# Patient Record
Sex: FEMALE | Race: WHITE | NOT HISPANIC OR LATINO | Employment: OTHER | URBAN - NONMETROPOLITAN AREA
[De-identification: names, ages, dates, MRNs, and addresses within clinical notes are randomized per-mention and may not be internally consistent; named-entity substitution may affect disease eponyms.]

---

## 2018-02-14 ENCOUNTER — MEDICAL CORRESPONDENCE (OUTPATIENT)
Dept: HEALTH INFORMATION MANAGEMENT | Facility: OTHER | Age: 83
End: 2018-02-14

## 2018-02-14 DIAGNOSIS — M47.814 SPONDYLOSIS OF THORACIC REGION WITHOUT MYELOPATHY OR RADICULOPATHY: Primary | ICD-10-CM

## 2018-07-02 DIAGNOSIS — M54.50 LEFT LOW BACK PAIN: Primary | ICD-10-CM

## 2019-03-09 ENCOUNTER — TRANSFERRED RECORDS (OUTPATIENT)
Dept: HEALTH INFORMATION MANAGEMENT | Facility: OTHER | Age: 84
End: 2019-03-09

## 2019-05-15 RX ORDER — SILVER SULFADIAZINE 1 %
CREAM (GRAM) TOPICAL
Refills: 5 | OUTPATIENT
Start: 2019-05-15

## 2019-05-15 NOTE — TELEPHONE ENCOUNTER
Rx request from pharmacy as follows:    Name from pharmacy: SSD 1%              CRE          Will file in chart as: SSD 1 % external cream    Sig: APPLY TWICE DAILY TO  BURN    Disp:  Not specified (Pharmacy requested: 600 each)    Refills:  5    Start: 5/13/2019    Class: E-Prescribe    To pharmacy: Please consider 90 day supplies to promote better adherence    Requested on: 12/4/2009    Last refill: 12/4/2009      With last fill date in 2009. Writer also notes that patient has been receiving care at [facility] in [place] and that PCP is Dr. Cruz and not Dr. Fatima. Call placed to [facility] pharmacy. Spoke to Jie, pharmacist. Advised her of above. Pharmacist states that they are aware of PCP at [facility] and refill request has already been routed to him. She advised this writer to disregard Rx request at this time. Writer will do as requested.    Unable to complete prescription refill per RN Medication Refill Policy. Gilbert Finch 5/15/2019 12:24 PM

## 2019-10-28 ENCOUNTER — HOSPITAL ENCOUNTER (INPATIENT)
Facility: OTHER | Age: 84
LOS: 3 days | Discharge: NURSING, REHAB OR LONG-TERM CARE | DRG: 871 | End: 2019-10-31
Attending: FAMILY MEDICINE | Admitting: FAMILY MEDICINE
Payer: COMMERCIAL

## 2019-10-28 ENCOUNTER — APPOINTMENT (OUTPATIENT)
Dept: GENERAL RADIOLOGY | Facility: OTHER | Age: 84
DRG: 871 | End: 2019-10-28
Attending: FAMILY MEDICINE
Payer: COMMERCIAL

## 2019-10-28 ENCOUNTER — APPOINTMENT (OUTPATIENT)
Dept: OCCUPATIONAL THERAPY | Facility: OTHER | Age: 84
DRG: 871 | End: 2019-10-28
Attending: FAMILY MEDICINE
Payer: COMMERCIAL

## 2019-10-28 DIAGNOSIS — A41.9 SEPSIS, DUE TO UNSPECIFIED ORGANISM, UNSPECIFIED WHETHER ACUTE ORGAN DYSFUNCTION PRESENT (H): ICD-10-CM

## 2019-10-28 DIAGNOSIS — Z87.891 PERSONAL HISTORY OF TOBACCO USE, PRESENTING HAZARDS TO HEALTH: ICD-10-CM

## 2019-10-28 DIAGNOSIS — J18.9 PNEUMONIA DUE TO INFECTIOUS ORGANISM, UNSPECIFIED LATERALITY, UNSPECIFIED PART OF LUNG: Primary | ICD-10-CM

## 2019-10-28 DIAGNOSIS — I25.2 OLD MYOCARDIAL INFARCTION: ICD-10-CM

## 2019-10-28 DIAGNOSIS — Z79.82 ENCOUNTER FOR LONG-TERM (CURRENT) USE OF ASPIRIN: ICD-10-CM

## 2019-10-28 PROBLEM — R06.2 WHEEZING: Status: ACTIVE | Noted: 2019-01-05

## 2019-10-28 PROBLEM — E73.9 LACTOSE INTOLERANCE: Status: ACTIVE | Noted: 2019-01-05

## 2019-10-28 PROBLEM — C44.90 NONMELANOMA SKIN CANCER: Status: ACTIVE | Noted: 2019-10-28

## 2019-10-28 PROBLEM — N32.89 BLADDER INSTABILITY: Status: ACTIVE | Noted: 2017-10-04

## 2019-10-28 PROBLEM — R41.89 COGNITIVE IMPAIRMENT: Status: ACTIVE | Noted: 2019-01-06

## 2019-10-28 PROBLEM — R11.10 VOMITING: Status: RESOLVED | Noted: 2019-01-06 | Resolved: 2019-10-28

## 2019-10-28 PROBLEM — E04.9 GOITER: Status: ACTIVE | Noted: 2019-01-05

## 2019-10-28 PROBLEM — L60.0 ONYCHOCRYPTOSIS: Status: ACTIVE | Noted: 2019-10-28

## 2019-10-28 PROBLEM — M40.00 KYPHOSIS (ACQUIRED) (POSTURAL): Status: ACTIVE | Noted: 2019-01-05

## 2019-10-28 PROBLEM — R06.02 SHORTNESS OF BREATH: Status: ACTIVE | Noted: 2019-01-05

## 2019-10-28 PROBLEM — N39.498 OTHER URINARY INCONTINENCE: Status: ACTIVE | Noted: 2019-01-05

## 2019-10-28 PROBLEM — G89.29 CHRONIC RIGHT HIP PAIN: Status: ACTIVE | Noted: 2017-10-04

## 2019-10-28 PROBLEM — R06.2 WHEEZING: Status: RESOLVED | Noted: 2019-01-05 | Resolved: 2019-10-28

## 2019-10-28 PROBLEM — R11.10 VOMITING: Status: ACTIVE | Noted: 2019-01-06

## 2019-10-28 PROBLEM — R06.02 SHORTNESS OF BREATH: Status: RESOLVED | Noted: 2019-01-05 | Resolved: 2019-10-28

## 2019-10-28 PROBLEM — M25.551 CHRONIC RIGHT HIP PAIN: Status: ACTIVE | Noted: 2017-10-04

## 2019-10-28 LAB
ALBUMIN SERPL-MCNC: 4.7 G/DL (ref 3.5–5.7)
ALBUMIN UR-MCNC: NEGATIVE MG/DL
ALP SERPL-CCNC: 79 U/L (ref 34–104)
ALT SERPL W P-5'-P-CCNC: 23 U/L (ref 7–52)
ANION GAP SERPL CALCULATED.3IONS-SCNC: 10 MMOL/L (ref 3–14)
APPEARANCE UR: CLEAR
AST SERPL W P-5'-P-CCNC: 47 U/L (ref 13–39)
BACTERIA #/AREA URNS HPF: ABNORMAL /HPF
BASOPHILS # BLD AUTO: 0 10E9/L (ref 0–0.2)
BASOPHILS NFR BLD AUTO: 0.2 %
BILIRUB SERPL-MCNC: 0.6 MG/DL (ref 0.3–1)
BILIRUB UR QL STRIP: NEGATIVE
BUN SERPL-MCNC: 20 MG/DL (ref 7–25)
CALCIUM SERPL-MCNC: 10.7 MG/DL (ref 8.6–10.3)
CHLORIDE SERPL-SCNC: 99 MMOL/L (ref 98–107)
CO2 SERPL-SCNC: 27 MMOL/L (ref 21–31)
COLOR UR AUTO: YELLOW
CREAT SERPL-MCNC: 0.8 MG/DL (ref 0.6–1.2)
DIFFERENTIAL METHOD BLD: ABNORMAL
EOSINOPHIL # BLD AUTO: 0 10E9/L (ref 0–0.7)
EOSINOPHIL NFR BLD AUTO: 0 %
ERYTHROCYTE [DISTWIDTH] IN BLOOD BY AUTOMATED COUNT: 14.1 % (ref 10–15)
FLUAV+FLUBV RNA SPEC QL NAA+PROBE: NEGATIVE
FLUAV+FLUBV RNA SPEC QL NAA+PROBE: NEGATIVE
GFR SERPL CREATININE-BSD FRML MDRD: 68 ML/MIN/{1.73_M2}
GLUCOSE SERPL-MCNC: 189 MG/DL (ref 70–105)
GLUCOSE UR STRIP-MCNC: NEGATIVE MG/DL
HCT VFR BLD AUTO: 41.7 % (ref 35–47)
HGB BLD-MCNC: 13.3 G/DL (ref 11.7–15.7)
HGB UR QL STRIP: ABNORMAL
IMM GRANULOCYTES # BLD: 0.6 10E9/L (ref 0–0.4)
IMM GRANULOCYTES NFR BLD: 2.3 %
KETONES UR STRIP-MCNC: NEGATIVE MG/DL
LACTATE SERPL-SCNC: 1.9 MMOL/L (ref 0.5–2.2)
LACTATE SERPL-SCNC: 2.1 MMOL/L (ref 0.5–2.2)
LEUKOCYTE ESTERASE UR QL STRIP: NEGATIVE
LYMPHOCYTES # BLD AUTO: 0.5 10E9/L (ref 0.8–5.3)
LYMPHOCYTES NFR BLD AUTO: 2.3 %
MAGNESIUM SERPL-MCNC: 1.9 MG/DL (ref 1.9–2.7)
MCH RBC QN AUTO: 29.2 PG (ref 26.5–33)
MCHC RBC AUTO-ENTMCNC: 31.9 G/DL (ref 31.5–36.5)
MCV RBC AUTO: 91 FL (ref 78–100)
MONOCYTES # BLD AUTO: 1.2 10E9/L (ref 0–1.3)
MONOCYTES NFR BLD AUTO: 5.2 %
NEUTROPHILS # BLD AUTO: 21.1 10E9/L (ref 1.6–8.3)
NEUTROPHILS NFR BLD AUTO: 90 %
NITRATE UR QL: NEGATIVE
PH UR STRIP: 6.5 PH (ref 5–9)
PLATELET # BLD AUTO: 306 10E9/L (ref 150–450)
POTASSIUM SERPL-SCNC: 3.8 MMOL/L (ref 3.5–5.1)
PROCALCITONIN SERPL-MCNC: 3.2 NG/ML
PROT SERPL-MCNC: 8.5 G/DL (ref 6.4–8.9)
RBC # BLD AUTO: 4.56 10E12/L (ref 3.8–5.2)
RBC #/AREA URNS AUTO: ABNORMAL /HPF
RSV RNA SPEC NAA+PROBE: NEGATIVE
SODIUM SERPL-SCNC: 136 MMOL/L (ref 134–144)
SOURCE: ABNORMAL
SP GR UR STRIP: 1.02 (ref 1–1.03)
SPECIMEN SOURCE: NORMAL
UROBILINOGEN UR STRIP-ACNC: 0.2 EU/DL (ref 0.2–1)
WBC # BLD AUTO: 23.4 10E9/L (ref 4–11)
WBC #/AREA URNS AUTO: ABNORMAL /HPF

## 2019-10-28 PROCEDURE — 97535 SELF CARE MNGMENT TRAINING: CPT | Mod: GO | Performed by: OCCUPATIONAL THERAPIST

## 2019-10-28 PROCEDURE — 83735 ASSAY OF MAGNESIUM: CPT | Performed by: FAMILY MEDICINE

## 2019-10-28 PROCEDURE — 85025 COMPLETE CBC W/AUTO DIFF WBC: CPT | Performed by: FAMILY MEDICINE

## 2019-10-28 PROCEDURE — 83605 ASSAY OF LACTIC ACID: CPT | Performed by: FAMILY MEDICINE

## 2019-10-28 PROCEDURE — 81001 URINALYSIS AUTO W/SCOPE: CPT | Performed by: FAMILY MEDICINE

## 2019-10-28 PROCEDURE — 25000132 ZZH RX MED GY IP 250 OP 250 PS 637: Performed by: FAMILY MEDICINE

## 2019-10-28 PROCEDURE — 36415 COLL VENOUS BLD VENIPUNCTURE: CPT | Performed by: FAMILY MEDICINE

## 2019-10-28 PROCEDURE — 97165 OT EVAL LOW COMPLEX 30 MIN: CPT | Mod: GO | Performed by: OCCUPATIONAL THERAPIST

## 2019-10-28 PROCEDURE — 97530 THERAPEUTIC ACTIVITIES: CPT | Mod: GO | Performed by: OCCUPATIONAL THERAPIST

## 2019-10-28 PROCEDURE — 87631 RESP VIRUS 3-5 TARGETS: CPT | Performed by: FAMILY MEDICINE

## 2019-10-28 PROCEDURE — 25800030 ZZH RX IP 258 OP 636: Performed by: FAMILY MEDICINE

## 2019-10-28 PROCEDURE — 12000000 ZZH R&B MED SURG/OB

## 2019-10-28 PROCEDURE — 71046 X-RAY EXAM CHEST 2 VIEWS: CPT

## 2019-10-28 PROCEDURE — 96365 THER/PROPH/DIAG IV INF INIT: CPT | Performed by: FAMILY MEDICINE

## 2019-10-28 PROCEDURE — 80053 COMPREHEN METABOLIC PANEL: CPT | Performed by: FAMILY MEDICINE

## 2019-10-28 PROCEDURE — 99285 EMERGENCY DEPT VISIT HI MDM: CPT | Mod: Z6 | Performed by: FAMILY MEDICINE

## 2019-10-28 PROCEDURE — 96375 TX/PRO/DX INJ NEW DRUG ADDON: CPT | Performed by: FAMILY MEDICINE

## 2019-10-28 PROCEDURE — 25000128 H RX IP 250 OP 636: Performed by: FAMILY MEDICINE

## 2019-10-28 PROCEDURE — 84145 PROCALCITONIN (PCT): CPT | Performed by: FAMILY MEDICINE

## 2019-10-28 PROCEDURE — 97530 THERAPEUTIC ACTIVITIES: CPT | Mod: GP

## 2019-10-28 PROCEDURE — 87040 BLOOD CULTURE FOR BACTERIA: CPT | Performed by: FAMILY MEDICINE

## 2019-10-28 PROCEDURE — 93010 ELECTROCARDIOGRAM REPORT: CPT | Performed by: INTERNAL MEDICINE

## 2019-10-28 PROCEDURE — 99285 EMERGENCY DEPT VISIT HI MDM: CPT | Mod: 25 | Performed by: FAMILY MEDICINE

## 2019-10-28 PROCEDURE — 97161 PT EVAL LOW COMPLEX 20 MIN: CPT | Mod: GP

## 2019-10-28 PROCEDURE — 99223 1ST HOSP IP/OBS HIGH 75: CPT | Mod: AI | Performed by: FAMILY MEDICINE

## 2019-10-28 RX ORDER — POLYETHYLENE GLYCOL 3350 17 G/17G
17 POWDER, FOR SOLUTION ORAL DAILY PRN
Status: DISCONTINUED | OUTPATIENT
Start: 2019-10-28 | End: 2019-10-31 | Stop reason: HOSPADM

## 2019-10-28 RX ORDER — ASPIRIN 81 MG/1
81 TABLET ORAL DAILY
Status: ON HOLD | COMMUNITY
End: 2023-01-01

## 2019-10-28 RX ORDER — POTASSIUM CHLORIDE 7.45 MG/ML
10 INJECTION INTRAVENOUS
Status: DISCONTINUED | OUTPATIENT
Start: 2019-10-28 | End: 2019-10-31 | Stop reason: HOSPADM

## 2019-10-28 RX ORDER — POTASSIUM CHLORIDE 1500 MG/1
20-40 TABLET, EXTENDED RELEASE ORAL
Status: DISCONTINUED | OUTPATIENT
Start: 2019-10-28 | End: 2019-10-31 | Stop reason: HOSPADM

## 2019-10-28 RX ORDER — KETOCONAZOLE 20 MG/G
CREAM TOPICAL DAILY PRN
Status: ON HOLD | COMMUNITY
Start: 2018-01-26 | End: 2022-01-01

## 2019-10-28 RX ORDER — SODIUM CHLORIDE 9 MG/ML
INJECTION, SOLUTION INTRAVENOUS CONTINUOUS
Status: DISCONTINUED | OUTPATIENT
Start: 2019-10-28 | End: 2019-10-29

## 2019-10-28 RX ORDER — MAGNESIUM SULFATE HEPTAHYDRATE 40 MG/ML
4 INJECTION, SOLUTION INTRAVENOUS EVERY 4 HOURS PRN
Status: DISCONTINUED | OUTPATIENT
Start: 2019-10-28 | End: 2019-10-31 | Stop reason: HOSPADM

## 2019-10-28 RX ORDER — TRAMADOL HYDROCHLORIDE 50 MG/1
50 TABLET ORAL EVERY 6 HOURS PRN
Status: DISCONTINUED | OUTPATIENT
Start: 2019-10-28 | End: 2019-10-31 | Stop reason: HOSPADM

## 2019-10-28 RX ORDER — ATORVASTATIN CALCIUM 10 MG/1
10 TABLET, FILM COATED ORAL DAILY
Status: DISCONTINUED | OUTPATIENT
Start: 2019-10-28 | End: 2019-10-31 | Stop reason: HOSPADM

## 2019-10-28 RX ORDER — OXYBUTYNIN CHLORIDE 5 MG/1
5 TABLET, EXTENDED RELEASE ORAL 2 TIMES DAILY
COMMUNITY
Start: 2019-01-01

## 2019-10-28 RX ORDER — LIDOCAINE 40 MG/G
CREAM TOPICAL
Status: DISCONTINUED | OUTPATIENT
Start: 2019-10-28 | End: 2019-10-31 | Stop reason: HOSPADM

## 2019-10-28 RX ORDER — AMOXICILLIN 250 MG
1 CAPSULE ORAL 2 TIMES DAILY PRN
Status: DISCONTINUED | OUTPATIENT
Start: 2019-10-28 | End: 2019-10-31 | Stop reason: HOSPADM

## 2019-10-28 RX ORDER — ONDANSETRON 2 MG/ML
4 INJECTION INTRAMUSCULAR; INTRAVENOUS EVERY 6 HOURS PRN
Status: DISCONTINUED | OUTPATIENT
Start: 2019-10-28 | End: 2019-10-31 | Stop reason: HOSPADM

## 2019-10-28 RX ORDER — PROCHLORPERAZINE 25 MG
12.5 SUPPOSITORY, RECTAL RECTAL EVERY 12 HOURS PRN
Status: DISCONTINUED | OUTPATIENT
Start: 2019-10-28 | End: 2019-10-31 | Stop reason: HOSPADM

## 2019-10-28 RX ORDER — PRAVASTATIN SODIUM 20 MG
20 TABLET ORAL
COMMUNITY
Start: 2018-10-31 | End: 2022-01-01

## 2019-10-28 RX ORDER — AMOXICILLIN 250 MG
2 CAPSULE ORAL 2 TIMES DAILY PRN
Status: DISCONTINUED | OUTPATIENT
Start: 2019-10-28 | End: 2019-10-31 | Stop reason: HOSPADM

## 2019-10-28 RX ORDER — LIDOCAINE 40 MG/G
CREAM TOPICAL
Status: DISCONTINUED | OUTPATIENT
Start: 2019-10-28 | End: 2019-10-28

## 2019-10-28 RX ORDER — ACETAMINOPHEN 325 MG/1
650 TABLET ORAL
Status: DISCONTINUED | OUTPATIENT
Start: 2019-10-28 | End: 2019-10-28

## 2019-10-28 RX ORDER — ONDANSETRON 4 MG/1
4 TABLET, ORALLY DISINTEGRATING ORAL EVERY 6 HOURS PRN
Status: DISCONTINUED | OUTPATIENT
Start: 2019-10-28 | End: 2019-10-31 | Stop reason: HOSPADM

## 2019-10-28 RX ORDER — ALENDRONATE SODIUM 70 MG/1
70 TABLET ORAL
COMMUNITY
Start: 2014-10-07 | End: 2022-01-01

## 2019-10-28 RX ORDER — NALOXONE HYDROCHLORIDE 0.4 MG/ML
.1-.4 INJECTION, SOLUTION INTRAMUSCULAR; INTRAVENOUS; SUBCUTANEOUS
Status: DISCONTINUED | OUTPATIENT
Start: 2019-10-28 | End: 2019-10-31 | Stop reason: HOSPADM

## 2019-10-28 RX ORDER — ASPIRIN 81 MG/1
81 TABLET ORAL DAILY
Status: DISCONTINUED | OUTPATIENT
Start: 2019-10-28 | End: 2019-10-31 | Stop reason: HOSPADM

## 2019-10-28 RX ORDER — CEFTRIAXONE SODIUM 2 G
2 VIAL (EA) INJECTION EVERY 24 HOURS
Status: DISCONTINUED | OUTPATIENT
Start: 2019-10-29 | End: 2019-10-31 | Stop reason: HOSPADM

## 2019-10-28 RX ORDER — MAGNESIUM SULFATE HEPTAHYDRATE 40 MG/ML
2 INJECTION, SOLUTION INTRAVENOUS DAILY PRN
Status: DISCONTINUED | OUTPATIENT
Start: 2019-10-28 | End: 2019-10-31 | Stop reason: HOSPADM

## 2019-10-28 RX ORDER — AZITHROMYCIN 500 MG/5ML
500 INJECTION, POWDER, LYOPHILIZED, FOR SOLUTION INTRAVENOUS EVERY 24 HOURS
Status: DISCONTINUED | OUTPATIENT
Start: 2019-10-28 | End: 2019-10-31 | Stop reason: HOSPADM

## 2019-10-28 RX ORDER — PROCHLORPERAZINE MALEATE 5 MG
5 TABLET ORAL EVERY 6 HOURS PRN
Status: DISCONTINUED | OUTPATIENT
Start: 2019-10-28 | End: 2019-10-31 | Stop reason: HOSPADM

## 2019-10-28 RX ORDER — OXYBUTYNIN CHLORIDE 5 MG/1
5 TABLET, EXTENDED RELEASE ORAL DAILY
Status: DISCONTINUED | OUTPATIENT
Start: 2019-10-28 | End: 2019-10-31 | Stop reason: HOSPADM

## 2019-10-28 RX ORDER — SODIUM CHLORIDE 9 MG/ML
1000 INJECTION, SOLUTION INTRAVENOUS CONTINUOUS
Status: DISCONTINUED | OUTPATIENT
Start: 2019-10-28 | End: 2019-10-28 | Stop reason: ALTCHOICE

## 2019-10-28 RX ORDER — ACETAMINOPHEN 500 MG
1000 TABLET ORAL EVERY 8 HOURS PRN
COMMUNITY

## 2019-10-28 RX ORDER — ONDANSETRON 2 MG/ML
4 INJECTION INTRAMUSCULAR; INTRAVENOUS EVERY 30 MIN PRN
Status: DISCONTINUED | OUTPATIENT
Start: 2019-10-28 | End: 2019-10-28

## 2019-10-28 RX ORDER — ACETAMINOPHEN 650 MG/1
650 SUPPOSITORY RECTAL
Status: DISCONTINUED | OUTPATIENT
Start: 2019-10-28 | End: 2019-10-28

## 2019-10-28 RX ORDER — CEFTRIAXONE SODIUM 2 G
2 VIAL (EA) INJECTION ONCE
Status: COMPLETED | OUTPATIENT
Start: 2019-10-28 | End: 2019-10-28

## 2019-10-28 RX ORDER — NITROGLYCERIN 0.4 MG/1
0.4 TABLET SUBLINGUAL EVERY 5 MIN PRN
COMMUNITY
Start: 2014-04-02

## 2019-10-28 RX ORDER — PRAVASTATIN SODIUM 40 MG
40 TABLET ORAL DAILY
Status: DISCONTINUED | OUTPATIENT
Start: 2019-10-28 | End: 2019-10-28 | Stop reason: CLARIF

## 2019-10-28 RX ORDER — FAMOTIDINE 20 MG/1
20 TABLET, FILM COATED ORAL DAILY
Status: DISCONTINUED | OUTPATIENT
Start: 2019-10-28 | End: 2019-10-31 | Stop reason: HOSPADM

## 2019-10-28 RX ORDER — TRAMADOL HYDROCHLORIDE 50 MG/1
50 TABLET ORAL EVERY 6 HOURS PRN
Status: ON HOLD | COMMUNITY
End: 2019-10-28

## 2019-10-28 RX ORDER — ACETAMINOPHEN 325 MG/1
650 TABLET ORAL EVERY 4 HOURS PRN
Status: DISCONTINUED | OUTPATIENT
Start: 2019-10-28 | End: 2019-10-31 | Stop reason: HOSPADM

## 2019-10-28 RX ADMIN — SODIUM CHLORIDE 1000 ML: 9 INJECTION, SOLUTION INTRAVENOUS at 05:47

## 2019-10-28 RX ADMIN — ACETAMINOPHEN 650 MG: 325 TABLET, FILM COATED ORAL at 22:04

## 2019-10-28 RX ADMIN — CEFTRIAXONE SODIUM 2 G: 2 INJECTION, POWDER, FOR SOLUTION INTRAMUSCULAR; INTRAVENOUS at 06:02

## 2019-10-28 RX ADMIN — ACETAMINOPHEN 650 MG: 325 TABLET, FILM COATED ORAL at 07:27

## 2019-10-28 RX ADMIN — ONDANSETRON HYDROCHLORIDE 4 MG: 2 INJECTION, SOLUTION INTRAMUSCULAR; INTRAVENOUS at 04:42

## 2019-10-28 RX ADMIN — ASPIRIN 81 MG: 81 TABLET, COATED ORAL at 10:19

## 2019-10-28 RX ADMIN — ENOXAPARIN SODIUM 40 MG: 40 INJECTION SUBCUTANEOUS at 10:19

## 2019-10-28 RX ADMIN — ONDANSETRON HYDROCHLORIDE 4 MG: 2 INJECTION, SOLUTION INTRAMUSCULAR; INTRAVENOUS at 05:44

## 2019-10-28 RX ADMIN — ACETAMINOPHEN 650 MG: 325 TABLET, FILM COATED ORAL at 12:37

## 2019-10-28 RX ADMIN — SODIUM CHLORIDE: 9 INJECTION, SOLUTION INTRAVENOUS at 04:42

## 2019-10-28 RX ADMIN — AZITHROMYCIN MONOHYDRATE 500 MG: 500 INJECTION, POWDER, LYOPHILIZED, FOR SOLUTION INTRAVENOUS at 08:50

## 2019-10-28 RX ADMIN — SODIUM CHLORIDE: 9 INJECTION, SOLUTION INTRAVENOUS at 12:43

## 2019-10-28 RX ADMIN — FAMOTIDINE 20 MG: 20 TABLET ORAL at 10:18

## 2019-10-28 RX ADMIN — ACETAMINOPHEN 650 MG: 325 TABLET, FILM COATED ORAL at 16:33

## 2019-10-28 RX ADMIN — ATORVASTATIN CALCIUM 10 MG: 10 TABLET, FILM COATED ORAL at 10:18

## 2019-10-28 RX ADMIN — MAGNESIUM SULFATE HEPTAHYDRATE 2 G: 40 INJECTION, SOLUTION INTRAVENOUS at 12:38

## 2019-10-28 RX ADMIN — POTASSIUM CHLORIDE 20 MEQ: 1500 TABLET, EXTENDED RELEASE ORAL at 16:33

## 2019-10-28 RX ADMIN — OXYBUTYNIN CHLORIDE 5 MG: 5 TABLET, EXTENDED RELEASE ORAL at 12:23

## 2019-10-28 ASSESSMENT — ENCOUNTER SYMPTOMS
CONFUSION: 0
FEVER: 0
DIFFICULTY URINATING: 0
SHORTNESS OF BREATH: 0
HEADACHES: 0
ARTHRALGIAS: 0
ABDOMINAL PAIN: 0
EYE REDNESS: 0
COLOR CHANGE: 0
NECK STIFFNESS: 0
DYSURIA: 0

## 2019-10-28 ASSESSMENT — MIFFLIN-ST. JEOR
SCORE: 1322.66
SCORE: 1312.23

## 2019-10-28 ASSESSMENT — ACTIVITIES OF DAILY LIVING (ADL)
ADLS_ACUITY_SCORE: 22
ADLS_ACUITY_SCORE: 16
ADLS_ACUITY_SCORE: 16

## 2019-10-28 NOTE — PHARMACY-CONSULT NOTE
Pharmacy- Renal Dose Adjustment    Patient Active Problem List   Diagnosis     Bladder instability     Chronic right hip pain     Cognitive impairment     Colonoscopy refused     Coronary atherosclerosis of native coronary artery     Full dentures     Goiter     Gout     Hyperlipidemia     Hypertension     Kyphosis (acquired) (postural)     Lactose intolerance     Nonmelanoma skin cancer     Onychocryptosis     Onychomycosis     Osteopenia     Other urinary incontinence     Urinary tract infection     Unstable angina (H)     Sepsis (H)        Relevant Labs:  Recent Labs   Lab Test 10/28/19  0450 03/19/15  0956   WBC 23.4*  --    HGB 13.3 12.6    330        CrCl: ~ 42 mL/min (rounded SCr to 1)    No intake or output data in the 24 hours ending 10/28/19 0857       Per Renal Dose Adjustment Protocol, will adjust:  1. Famotidine 20 mg to daily per CrCl 30-50 mL/min      Will continue to follow and make adjustments accordingly. Thank You.    Flor Medina [facility] ....................  10/28/2019   8:57 AM

## 2019-10-28 NOTE — PROGRESS NOTES
"Niru Durham 87 year old female   Admission date:10/28/2019   Code status: Full Code   Isolation:No active isolations   Principal Problem:Sepsis (H)   Diet: Carbohydrate Controlled Diet  Post Op Day #: NA  Peds:YES, Not applicable  [place]: dominick  Discharge timeline & plan: unsure of discharge date and/or discharge needs at this time.  Vital signs:  Temp: 101  F (38.3  C) Temp src: Tympanic BP: 116/56 Pulse: 118 Heart Rate: 94 Resp: 16 SpO2: 90 % O2 Device: None (Room air)   Height: 157.5 cm (5' 2\") Weight: 92.4 kg (203 lb 11.2 oz)  Estimated body mass index is 37.26 kg/m  as calculated from the following:    Height as of this encounter: 1.575 m (5' 2\").    Weight as of this encounter: 92.4 kg (203 lb 11.2 oz).  Abnormal Physical Assessment: decreased mobility.  Last Pain/PRN Medication given:none  Abnormal labs; Finger stick POCT blood sugars,: noon blood sugar 207, 1 unit of insulin.  Telemetry: No  Pending tests/procedures planned:none  Physical therapy to eval for transfers.    SAFETY CHECKLIST  Arm Bands/signs/magnets (code status, fall risk, allergy, limb, etc.) in place:YES  IVF/Medications/rate ordered infusing (do they match the order?):YES  Physical assessment (wounds, incisions, dopplers, LDA's, neuro, CIWA, etc.)YES   Environmental assessment (bed/chair alarm on, call light, side rails, restraints, etc.):{YES   Whiteboard updated:YES            "

## 2019-10-28 NOTE — H&P
[facility]    History and Physical  Hospitalist       Date of Admission:  10/28/2019    Assessment & Plan   Niru Durham is a 87 year old female who presents with fever and fall.       Sepsis (H), etiology unclear. Blunting at R base on CXR and few bacteria in urine. Hx of recurrent UTI.  -admit inpt  -ceftriaxone and azithro  -IVF  -diet as tolerated  -tylenol prn pain/fever  -oxygen as needed to maintain sats >92%.   -ambulate as able.  Requires assistance and walker  -PT/OT      Cognitive impairment, noted. Currently living alone.       Hypertension. BP normal here. Not on any antihypertensives prior to admit. Noted. Stable.       DVT Prophylaxis: Enoxaparin (Lovenox) SQ  Code Status: No Order    Yoselyn Zuñiga    Primary Care Physician   Maria Teresa Fatima    Chief Complaint   Fever, fall    History is obtained from the patient and chart review.    History of Present Illness   Niru Durham is a 87 year old female who presents with fever and fall. Had chills last night. Tried to get out of bed and fell to floor due to weakness. Was down for several hours prior to getting to her alert button. Brought in by EMS. Had fever in ER. Prior hx of bladder surgery and recurrent UTI but denies dysuria, hematuria, frequency or urgency. No nausea or vomiting. No diarrhea. Had one episode of vomiting last night. Denies abdominal pain. Denies aspiration symptoms. Lives alone. No sick contacts. Does not get outside much. No recent travel or tick bites. No recent viral URI symptoms.     Past Medical History    I have reviewed this patient's medical history and updated it with pertinent information if needed.   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  Past Surgical History:   Procedure Laterality Date     APPENDECTOMY OPEN      1944     CHOLECYSTECTOMY      1971     COLONOSCOPY      02/28/05,2 hyperplastic polyps. Dr Matthews. Repeat 10 years     ENDOSCOPIC STRIPPING VEIN(S)      1971,Bilateral vein stripping     EXTRACAPSULAR CATARACT EXTRATION WITH INTRAOCULAR LENS IMPLANT      Bilateral cataract surgery with lens implant     HYSTERECTOMY TOTAL ABDOMINAL, BILATERAL SALPINGO-OOPHORECTOMY, COMBINED      1994     OTHER SURGICAL HISTORY      1982, 1994,[id number],DILATION AND CURETTAGE       Prior to Admission Medications   Prior to Admission Medications   Prescriptions Last Dose Informant Patient Reported? Taking?   alendronate (FOSAMAX) 70 MG tablet   Yes Yes   Sig: Take 70 mg by mouth every 7 days   aspirin 81 MG EC tablet   Yes No   Sig: Take 81 mg by mouth daily   ketoconazole (NIZORAL) 2 % external cream   Yes Yes   nitroGLYcerin (NITROSTAT) 0.4 MG sublingual tablet   Yes Yes   Sig: Place 0.4 mg under the tongue   oxybutynin ER (DITROPAN-XL) 10 MG 24 hr tablet   Yes Yes   Sig: TAKE 1 TABLET BY MOUTH EVERY DAY. DO NOT CRUSH   pravastatin (PRAVACHOL) 40 MG tablet   Yes Yes   Sig: Take 40 mg by mouth   traMADol (ULTRAM) 50 MG tablet   Yes Yes   Sig: Take 50 mg by mouth every 6 hours as needed for severe pain      Facility-Administered Medications: None     Allergies   Allergies   Allergen Reactions     Codeine Nausea     Naproxen Nausea and Vomiting       Social History   I have reviewed this patient's social history and updated it with pertinent information if  needed. Niru Durham  reports that she quit smoking about 36 years ago. Her smoking use included cigarettes. She has a 0.20 pack-year smoking history. She has never used smokeless tobacco. She reports that she does not drink alcohol.    Family History   I have reviewed this patient's family history and updated it with pertinent information if needed.   Family History   Problem Relation Age of Onset     Diabetes Mother         Diabetes,diabetic complications     Breast Cancer No family hx of         Cancer-breast       Review of Systems     REVIEW OF SYSTEMS:    Constitutional: normal energy and appetite, no recent sick contacts  Eyes: no changes in vision  Ears, nose, mouth, throat, and face: no mouth sores, dysphagia, or odynophagia  Respiratory: no shortness of breath, cough, or wheezing. No aspiration symptoms.   Cardiovascular: no chest pain, palpitations, orthopnea, increased lower extremity edema, or syncope.   Gastrointestinal: no constipation, diarrhea, nausea or abdominal pain.  Genitourinary: no dysuria, hematuria, urgency or frequency.   Hematologic/lymphatic: no unintentional weight loss or night sweats.  Musculoskeletal: no pain to extremities or falls.   Neurological: no new weakness, tingling, numbness.   Psychiatric: no hallucinations or delusions.  Endocrine:  not a known diabetic.     Additions to the above include: see HPI.     Physical Exam   Temp: 101  F (38.3  C) Temp src: Tympanic BP: 116/56 Pulse: 118 Heart Rate: 94 Resp: 16 SpO2: 90 % O2 Device: None (Room air)    Vital Signs with Ranges  Temp:  [101  F (38.3  C)-101.7  F (38.7  C)] 101  F (38.3  C)  Pulse:  [118] 118  Heart Rate:  [] 94  Resp:  [16-20] 16  BP: (116-152)/(56-80) 116/56  SpO2:  [90 %-91 %] 90 %  203 lbs 11.2 oz    Constitutional: AAO. NAD. overweight  Eyes: EOMI. Nonicteric, noninjected. Lids normal.   HEENT: NC, AT.   Respiratory: diminished. Rales at R base. No wheezing.   Cardiovascular: RR -murmur. No  edema  GI: abd soft, NT, ND.   Skin: warm, dry, intact. No rashes other than scattered bruising of R anterior lower leg  Musculoskeletal: weakness of B/L LE  Neurologic: CN intact grossly  Psychiatric: appropriate. Follows commands and answers questions appropriately.     Data   Data reviewed today:  I personally reviewed the chest x-ray image(s) showing blunting of R base..  Recent Labs   Lab 10/28/19  0450   WBC 23.4*   HGB 13.3   MCV 91         POTASSIUM 3.8   CHLORIDE 99   CO2 27   BUN 20   CR 0.80   ANIONGAP 10   ОЛЬГА 10.7*   *   ALBUMIN 4.7   PROTTOTAL 8.5   BILITOTAL 0.6   ALKPHOS 79   ALT 23   AST 47*       Recent Results (from the past 24 hour(s))   XR Chest 2 Views    Narrative    PROCEDURE:  XR CHEST 2 VW    HISTORY:  fever.     COMPARISON:  4/2/2014    FINDINGS:   The cardiac silhouette is normal in size. The pulmonary vasculature is  normal.  There are mild airspace opacities in both lung bases. There  are trace pleural effusions. No pleural effusion or pneumothorax.      Impression    IMPRESSION:  Bibasilar atelectasis or infiltrate and small pleural  effusions.      WOLF JANSEN MD

## 2019-10-28 NOTE — ED PROVIDER NOTES
"  History     Chief Complaint   Patient presents with     Fall     Shoulder Pain     HPI  Niru Durham is a 87 year old female who presents to the emergency department after rolling out of bed.  She laid on the floor for couple of hours until she hit her call button and she came in by EMS.  Reports right shoulder pain however she states it is her chronic pain.  She did not fall on anything that she thinks she heard or broke.  The right shin is numb she had her legs crossed when he was she was laying there for 2 hours.  She did not hit her head or lose consciousness.  She felt very chilled and could not get warm last night.  History of recurrent UTI.  No recent upper respiratory symptoms.  Did have an episode of emesis last night.  Feels mildly nauseated now.  Reviewed nurse's notes below, similar history is related to me.  Patient arrived via EMS after fall at home. Patient states she \"rolled out of bed\". Denies hitting head or LOC. Complains of right shoulder pain, although reports she has chronic rotator cuff problems. Also reports fever and vomiting last night before going to bed.   Allergies:  Allergies   Allergen Reactions     Codeine Nausea     Naproxen Nausea and Vomiting       Problem List:    There are no active problems to display for this patient.       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 Dr Matthews. Repeat 10 years     ENDOSCOPIC STRIPPING VEIN(S)      " "1971,Bilateral vein stripping     EXTRACAPSULAR CATARACT EXTRATION WITH INTRAOCULAR LENS IMPLANT      Bilateral cataract surgery with lens implant     HYSTERECTOMY TOTAL ABDOMINAL, BILATERAL SALPINGO-OOPHORECTOMY, COMBINED           OTHER SURGICAL HISTORY      , ,889893,DILATION AND CURETTAGE       Family History:    Family History   Problem Relation Age of Onset     Diabetes Mother         Diabetes,diabetic complications     Breast Cancer No family hx of         Cancer-breast       Social History:  Marital Status:   [5]  Social History     Tobacco Use     Smoking status: Former Smoker     Packs/day: 0.10     Years: 2.00     Pack years: 0.20     Types: Cigarettes     Last attempt to quit: 1983     Years since quittin.6     Smokeless tobacco: Never Used   Substance Use Topics     Alcohol use: No     Drug use: Unknown     Types: Other     Comment: Drug use: No        Medications:    alendronate (FOSAMAX) 70 MG tablet  ketoconazole (NIZORAL) 2 % external cream  nitroGLYcerin (NITROSTAT) 0.4 MG sublingual tablet  oxybutynin ER (DITROPAN-XL) 10 MG 24 hr tablet  pravastatin (PRAVACHOL) 40 MG tablet  traMADol (ULTRAM) 50 MG tablet  aspirin 81 MG EC tablet          Review of Systems   Constitutional: Negative for fever.   HENT: Negative for congestion.    Eyes: Negative for redness.   Respiratory: Negative for shortness of breath.    Cardiovascular: Negative for chest pain.   Gastrointestinal: Negative for abdominal pain.   Genitourinary: Negative for difficulty urinating, dysuria and enuresis.   Musculoskeletal: Negative for arthralgias and neck stiffness.   Skin: Negative for color change.   Neurological: Negative for headaches.   Psychiatric/Behavioral: Negative for confusion.       Physical Exam   BP: (!) 152/80  Pulse: 118  Heart Rate: 105  Temp: 101.2  F (38.4  C)  Resp: 20  Height: 157.5 cm (5' 2\")  Weight: 93.4 kg (206 lb)  SpO2: 91 %      Physical Exam  Vitals signs and nursing note " reviewed.   Constitutional:       General: She is not in acute distress.     Appearance: She is not diaphoretic.   HENT:      Head: Atraumatic.   Eyes:      Pupils: Pupils are equal, round, and reactive to light.   Cardiovascular:      Rate and Rhythm: Regular rhythm.      Heart sounds: Normal heart sounds.   Pulmonary:      Effort: No respiratory distress.      Breath sounds: Normal breath sounds.   Chest:      Chest wall: No tenderness.   Abdominal:      General: Bowel sounds are normal.      Palpations: Abdomen is soft.      Tenderness: There is no tenderness.   Musculoskeletal: Normal range of motion.         General: No tenderness.      Cervical back: She exhibits no tenderness.        Thoracic back: She exhibits no tenderness.      Lumbar back: She exhibits no tenderness.   Skin:     Findings: No abrasion or laceration.   Neurological:      Mental Status: She is alert and oriented to person, place, and time.         ED Course        Procedures     Results for orders placed or performed during the hospital encounter of 10/28/19 (from the past 24 hour(s))   CBC with platelets differential   Result Value Ref Range    WBC 23.4 (H) 4.0 - 11.0 10e9/L    RBC Count 4.56 3.8 - 5.2 10e12/L    Hemoglobin 13.3 11.7 - 15.7 g/dL    Hematocrit 41.7 35.0 - 47.0 %    MCV 91 78 - 100 fl    MCH 29.2 26.5 - 33.0 pg    MCHC 31.9 31.5 - 36.5 g/dL    RDW 14.1 10.0 - 15.0 %    Platelet Count 306 150 - 450 10e9/L    Diff Method Automated Method     % Neutrophils 90.0 %    % Lymphocytes 2.3 %    % Monocytes 5.2 %    % Eosinophils 0.0 %    % Basophils 0.2 %    % Immature Granulocytes 2.3 %    Absolute Neutrophil 21.1 (H) 1.6 - 8.3 10e9/L    Absolute Lymphocytes 0.5 (L) 0.8 - 5.3 10e9/L    Absolute Monocytes 1.2 0.0 - 1.3 10e9/L    Absolute Eosinophils 0.0 0.0 - 0.7 10e9/L    Absolute Basophils 0.0 0.0 - 0.2 10e9/L    Abs Immature Granulocytes 0.6 (H) 0 - 0.4 10e9/L   Comprehensive metabolic panel   Result Value Ref Range    Sodium 136  134 - 144 mmol/L    Potassium 3.8 3.5 - 5.1 mmol/L    Chloride 99 98 - 107 mmol/L    Carbon Dioxide 27 21 - 31 mmol/L    Anion Gap 10 3 - 14 mmol/L    Glucose 189 (H) 70 - 105 mg/dL    Urea Nitrogen 20 7 - 25 mg/dL    Creatinine 0.80 0.60 - 1.20 mg/dL    GFR Estimate 68 >60 mL/min/[1.73_m2]    GFR Estimate If Black 82 >60 mL/min/[1.73_m2]    Calcium 10.7 (H) 8.6 - 10.3 mg/dL    Bilirubin Total 0.6 0.3 - 1.0 mg/dL    Albumin 4.7 3.5 - 5.7 g/dL    Protein Total 8.5 6.4 - 8.9 g/dL    Alkaline Phosphatase 79 34 - 104 U/L    ALT 23 7 - 52 U/L    AST 47 (H) 13 - 39 U/L   Lactic acid   Result Value Ref Range    Lactic Acid 2.1 0.5 - 2.2 mmol/L   Influenza A and B and RSV PCR   Result Value Ref Range    Specimen Description Nasal     Influenza A PCR Negative NEG^Negative    Influenza B PCR Negative NEG^Negative    Resp Syncytial Virus Negative NEG^Negative       Medications   0.9% sodium chloride BOLUS (0 mLs Intravenous Stopped 10/28/19 0545)     Followed by   sodium chloride 0.9% infusion (1,000 mLs Intravenous New Bag 10/28/19 0547)   ondansetron (ZOFRAN) injection 4 mg (4 mg Intravenous Given 10/28/19 0544)   cefTRIAXone (ROCEPHIN) 2 g in NS (2 g Intravenous New Bag 10/28/19 0602)   acetaminophen (TYLENOL) tablet 650 mg (has no administration in time range)     Or   acetaminophen (TYLENOL) Suppository 650 mg (has no administration in time range)   lidocaine 1 % 0.1-1 mL (has no administration in time range)   lidocaine (LMX4) cream (has no administration in time range)   sodium chloride (PF) 0.9% PF flush 3 mL (has no administration in time range)   sodium chloride (PF) 0.9% PF flush 3 mL (has no administration in time range)     The patient has signs of Severe Sepsis  as evidenced by:    1. 2 SIRS criteria, AND  2. Suspected infection, AND   3. Organ dysfunction: Lactic Acid > 2.0    Time severe sepsis diagnosis confirmed:5:54 AM    10/28/19  as this was the time when Lactate resulted, and the level was > 2.0    3 Hour  Severe Sepsis Bundle Completion:  1. Initial Lactic Acid Result:   Recent Labs   Lab Test 10/28/19  0450   LACT 2.1     2. Blood Cultures before Antibiotics: Yes  3. Broad Spectrum Antibiotics Administered:  yes      Anti-infectives (From admission through now)    Start     Dose/Rate Route Frequency Ordered Stop    10/28/19 0552  cefTRIAXone (ROCEPHIN) 2 g in NS      2 g  over 30 Minutes Intravenous ONCE 10/28/19 0551            4. Volume of IV Fluid administered in ED: 1500     REMINDER: Please use septic shock SmartPhrase for Lactate > 4 or a patient  requiring vasopressors after initial fluid bolus (meaning persistent hypotension)      If one the following conditions is present, a 30cc/kg bolus is recommended as part of the 6 hour bundle (IBW can be used for BMI >30, or document refusal/contraindication)    1.   initial hypotension  defined as 2 bps < 90 or map < 65 in the 6hrs before or 6hrs  after time zero.    2.  Lactate >4.                 Fluid bolus not indicated: hemodynamically stable      Assessments & Plan (with Medical Decision Making)     I have reviewed the nursing notes.    I have reviewed the findings, diagnosis, plan and need for follow up with the patient.      New Prescriptions    No medications on file   Discussed with Dr. Zuñiga  who accepted patient for admission.  Sepsis without clear source at this point as urinalysis is fairly unremarkable except for few bacteria.  She does have an erythematous area on her right shin however she states that that was not there prior to her fall and she kind of was sitting with her right leg flexed under her left leg which caused this red area.  It certainly does not look clinically like a cellulitis.  She does state that her right leg feels numb but no pain.  If she has pain as we get her up and about and have her bear weight on this would consider x-raying her leg but I have low clinical suspicion for fracture.  Chest x-ray is reassuring as well, lactate  is improving with fluids here in the emergency department.  If her fever persists would consider abdominal CT for fever without source in elderly.  Patient and family verbalized understanding of plan and are in agreement.  Clinically improved over the course of her ER stay.    Final diagnoses:   Sepsis, due to unspecified organism, unspecified whether acute organ dysfunction present (H)       10/28/2019   [facility]     Eliot Rowland MD  10/28/19 0657       Eliot Rowland MD  10/28/19 0701

## 2019-10-28 NOTE — PROGRESS NOTES
" NS ADMISSION NOTE    Patient admitted to room 312 at approximately 0810 via cart from emergency room. Patient was accompanied by son.     Verbal SBAR report received from Aliyah prior to patient arrival.     Patient ambulated to bed assist of two.. Patient alert and oriented X 3. The patient is not having any pain. 0-10 Pain Scale: 6. Admission vital signs: Blood pressure 116/56, pulse 118, temperature 101  F (38.3  C), temperature source Tympanic, resp. rate 16, height 1.575 m (5' 2\"), weight 92.4 kg (203 lb 11.2 oz), SpO2 90 %. Patient and son were oriented to plan of care, call light, bed controls, tv, telephone, bathroom and visiting hours.     Risk Assessment    The following safety risks were identified during admission: fall. Yellow risk band applied: YES.     Skin Initial Assessment    This writer admitted this patient and completed a full skin assessment and Larry score in the Adult PCS flowsheet. Appropriate interventions initiated as needed.     Secondary skin check completed by Joann.    Larry Risk Assessment  Sensory Perception: 3-->slightly limited  Moisture: 4-->rarely moist  Activity: 3-->walks occasionally  Mobility: 2-->very limited  Nutrition: 3-->adequate  Friction and Shear: 3-->no apparent problem  Larry Score: 18    Education    Patient has a [place] to Observation order: No  Observation education completed and documented: N/A      Yolanda Telles RN    "

## 2019-10-28 NOTE — PHARMACY-ADMISSION MEDICATION HISTORY
Pharmacy -- Admission Medication Reconciliation    Prior to admission (PTA) medications were reviewed and the patient's PTA medication list was updated.    Sources Consulted: patient, Larry (still waiting on refill history), [facility] (no fills here)    The reliability of this Medication Reconciliation is: Reliability: Reliable    The following significant changes were made:  Acetaminophen ADDED  Tramadol REMOVED    In addition, the patient's allergies were reviewed with the patient and updated as follows:   Allergies: Codeine and Naproxen    The pharmacist has reviewed with the patient that all personal medications should be removed from the building or locked in the belongings safe.  Patient shall only take medications ordered by the physician and administered by the nursing staff.       Medication barriers identified: none   Medication adherence concerns: takes pravastatin at noon because taking at HS gives her bad heartburn   Understanding of emergency medications: carries nitroglycerin in her purse at all times    Valeria Smith [facility], 10/28/2019,  10:07 AM

## 2019-10-28 NOTE — ED TRIAGE NOTES
"Patient arrived via EMS after fall at home. Patient states she \"rolled out of bed\". Denies hitting head or LOC. Complains of right shoulder pain, although reports she has chronic rotator cuff problems. Also reports fever and vomiting last night before going to bed.   "

## 2019-10-28 NOTE — PROGRESS NOTES
Report received from Susanna Adams RN on 10/28/2019 at 6:58 AM     Home Suture Removal Text: Patient was provided instructions on removing sutures and will remove their sutures at home.  If they have any questions or difficulties they will call the office.

## 2019-10-28 NOTE — PROGRESS NOTES
"0312/0312-01  Niru Durham 87 year old female   Admission date:10/28/2019   Residence: Comes from home alone. Son lives close by.  Code status: Full Code   Isolation:No active isolations   Principal Problem:Sepsis (H)   Post Op Day #: NA  Peds:No  [place]: NA  Diet: regular  Mobility Status: EZ Stand  Discharge timeline & plan: unsure of discharge date and/or discharge needs at this time.  Vital signs:  Temp: 101.3  F (38.5  C) Temp src: Tympanic BP: (!) 140/50 Pulse: 88 Heart Rate: 94 Resp: 28 SpO2: 90 % O2 Device: None (Room air)   Height: 157.5 cm (5' 2\") Weight: 92.4 kg (203 lb 11.2 oz)  Estimated body mass index is 37.26 kg/m  as calculated from the following:    Height as of this encounter: 1.575 m (5' 2\").    Weight as of this encounter: 92.4 kg (203 lb 11.2 oz).  Abnormal Physical Assessment: Lungs coarse. Abrasion to right shin. Numbness/tingling to right leg. Hard time moving right leg. Fevers- being given Tylenol.   Last Pain/PRN Medication given: Tylenol @ 1633 and Potassium @ 1633.   Finger stick POCT blood sugars: NA   Labs: NA  Telemetry: No  Pending tests/procedures planned: AM labs- BMP, CBC with platelets, Magnesium  Comments: Numbness to right leg new.       SAFETY CHECKLIST  Arm Bands/Risk clasps correct and in place (DNR, Fall risk, Allergy, Latex, Limb):  Yes  IVF and rate ordered correct: Yes  Physical assessment verification(IV site, wounds, dressing intact, incisions, LDA's, neuro, CIWA...): Yes  Environmental assessment (bed/chair alarm on, call light, side rails, restraints, sitter....): Yes  Whiteboard updated by oncoming RN:Yes    Caty Pereira RN on 10/28/2019 at 6:58 PM          "

## 2019-10-29 ENCOUNTER — APPOINTMENT (OUTPATIENT)
Dept: PHYSICAL THERAPY | Facility: OTHER | Age: 84
DRG: 871 | End: 2019-10-29
Attending: FAMILY MEDICINE
Payer: COMMERCIAL

## 2019-10-29 ENCOUNTER — APPOINTMENT (OUTPATIENT)
Dept: OCCUPATIONAL THERAPY | Facility: OTHER | Age: 84
DRG: 871 | End: 2019-10-29
Payer: COMMERCIAL

## 2019-10-29 LAB
ANION GAP SERPL CALCULATED.3IONS-SCNC: 7 MMOL/L (ref 3–14)
BUN SERPL-MCNC: 14 MG/DL (ref 7–25)
CALCIUM SERPL-MCNC: 8.7 MG/DL (ref 8.6–10.3)
CHLORIDE SERPL-SCNC: 104 MMOL/L (ref 98–107)
CO2 SERPL-SCNC: 27 MMOL/L (ref 21–31)
CREAT SERPL-MCNC: 0.75 MG/DL (ref 0.6–1.2)
ERYTHROCYTE [DISTWIDTH] IN BLOOD BY AUTOMATED COUNT: 14.6 % (ref 10–15)
GFR SERPL CREATININE-BSD FRML MDRD: 73 ML/MIN/{1.73_M2}
GLUCOSE SERPL-MCNC: 150 MG/DL (ref 70–105)
HCT VFR BLD AUTO: 35.2 % (ref 35–47)
HGB BLD-MCNC: 11.1 G/DL (ref 11.7–15.7)
LACTATE SERPL-SCNC: 0.7 MMOL/L (ref 0.5–2.2)
MAGNESIUM SERPL-MCNC: 2.1 MG/DL (ref 1.9–2.7)
MCH RBC QN AUTO: 29 PG (ref 26.5–33)
MCHC RBC AUTO-ENTMCNC: 31.5 G/DL (ref 31.5–36.5)
MCV RBC AUTO: 92 FL (ref 78–100)
PLATELET # BLD AUTO: 234 10E9/L (ref 150–450)
POTASSIUM SERPL-SCNC: 3.9 MMOL/L (ref 3.5–5.1)
RBC # BLD AUTO: 3.83 10E12/L (ref 3.8–5.2)
SODIUM SERPL-SCNC: 138 MMOL/L (ref 134–144)
WBC # BLD AUTO: 15.2 10E9/L (ref 4–11)

## 2019-10-29 PROCEDURE — 97530 THERAPEUTIC ACTIVITIES: CPT | Mod: GO | Performed by: OCCUPATIONAL THERAPIST

## 2019-10-29 PROCEDURE — 83735 ASSAY OF MAGNESIUM: CPT | Performed by: FAMILY MEDICINE

## 2019-10-29 PROCEDURE — 12000000 ZZH R&B MED SURG/OB

## 2019-10-29 PROCEDURE — 80048 BASIC METABOLIC PNL TOTAL CA: CPT | Performed by: FAMILY MEDICINE

## 2019-10-29 PROCEDURE — 99232 SBSQ HOSP IP/OBS MODERATE 35: CPT | Performed by: FAMILY MEDICINE

## 2019-10-29 PROCEDURE — 97530 THERAPEUTIC ACTIVITIES: CPT | Mod: GP

## 2019-10-29 PROCEDURE — 25000132 ZZH RX MED GY IP 250 OP 250 PS 637: Performed by: FAMILY MEDICINE

## 2019-10-29 PROCEDURE — 97535 SELF CARE MNGMENT TRAINING: CPT | Mod: GO | Performed by: OCCUPATIONAL THERAPIST

## 2019-10-29 PROCEDURE — 83605 ASSAY OF LACTIC ACID: CPT | Performed by: FAMILY MEDICINE

## 2019-10-29 PROCEDURE — 97116 GAIT TRAINING THERAPY: CPT | Mod: GP

## 2019-10-29 PROCEDURE — 25000128 H RX IP 250 OP 636: Performed by: FAMILY MEDICINE

## 2019-10-29 PROCEDURE — 36415 COLL VENOUS BLD VENIPUNCTURE: CPT | Performed by: FAMILY MEDICINE

## 2019-10-29 PROCEDURE — 25800030 ZZH RX IP 258 OP 636: Performed by: FAMILY MEDICINE

## 2019-10-29 PROCEDURE — 85027 COMPLETE CBC AUTOMATED: CPT | Performed by: FAMILY MEDICINE

## 2019-10-29 RX ORDER — ALBUTEROL SULFATE 0.83 MG/ML
2.5 SOLUTION RESPIRATORY (INHALATION) EVERY 6 HOURS PRN
Status: DISCONTINUED | OUTPATIENT
Start: 2019-10-29 | End: 2019-10-31 | Stop reason: HOSPADM

## 2019-10-29 RX ADMIN — ASPIRIN 81 MG: 81 TABLET, COATED ORAL at 09:42

## 2019-10-29 RX ADMIN — ENOXAPARIN SODIUM 40 MG: 40 INJECTION SUBCUTANEOUS at 07:44

## 2019-10-29 RX ADMIN — ACETAMINOPHEN 650 MG: 325 TABLET, FILM COATED ORAL at 02:21

## 2019-10-29 RX ADMIN — OXYBUTYNIN CHLORIDE 5 MG: 5 TABLET, EXTENDED RELEASE ORAL at 09:41

## 2019-10-29 RX ADMIN — AZITHROMYCIN MONOHYDRATE 500 MG: 500 INJECTION, POWDER, LYOPHILIZED, FOR SOLUTION INTRAVENOUS at 07:44

## 2019-10-29 RX ADMIN — FAMOTIDINE 20 MG: 20 TABLET ORAL at 09:41

## 2019-10-29 RX ADMIN — ACETAMINOPHEN 650 MG: 325 TABLET, FILM COATED ORAL at 11:20

## 2019-10-29 RX ADMIN — ACETAMINOPHEN 650 MG: 325 TABLET, FILM COATED ORAL at 21:13

## 2019-10-29 RX ADMIN — SODIUM CHLORIDE: 9 INJECTION, SOLUTION INTRAVENOUS at 03:37

## 2019-10-29 RX ADMIN — POTASSIUM CHLORIDE 20 MEQ: 1500 TABLET, EXTENDED RELEASE ORAL at 06:21

## 2019-10-29 RX ADMIN — CEFTRIAXONE SODIUM 2 G: 2 INJECTION, POWDER, FOR SOLUTION INTRAMUSCULAR; INTRAVENOUS at 06:19

## 2019-10-29 RX ADMIN — ATORVASTATIN CALCIUM 10 MG: 10 TABLET, FILM COATED ORAL at 09:41

## 2019-10-29 ASSESSMENT — ACTIVITIES OF DAILY LIVING (ADL)
ADLS_ACUITY_SCORE: 20

## 2019-10-29 ASSESSMENT — MIFFLIN-ST. JEOR: SCORE: 1320.39

## 2019-10-29 NOTE — PROGRESS NOTES
10/28/19 1500   Quick Adds   Type of Visit Initial Occupational Therapy Evaluation   Living Environment   Lives With alone   Living Arrangements house   Home Accessibility   (pt's family has made accomodations )   Transportation Anticipated family or friend will provide   Self-Care   Usual Activity Tolerance good   Current Activity Tolerance poor   Equipment Currently Used at Home walker, rolling   Functional Level   Ambulation 1-->assistive equipment   Transferring 1-->assistive equipment   Toileting 0-->independent   Bathing 1-->assistive equipment   Dressing 0-->independent   Fall history within last six months yes   Number of times patient has fallen within last six months 1   Cognitive Status Examination   Orientation orientation to person, place and time   Level of Consciousness alert   Follows Commands (Cognition) WNL   Memory intact   Attention No deficits were identified   Pain Assessment   Patient Currently in Pain Yes, see Vital Sign flowsheet   Range of Motion (ROM)   ROM Quick Adds   (limited right shoulder use )   Coordination   Upper Extremity Coordination No deficits were identified   Transfer Skill: Bed to Chair/Chair to Bed   Level of [place]: Bed to Chair dependent (less than 25% patients effort)   Physical Assist/Nonphysical Assist: Bed to Chair 2 persons   Transfer Skill: Sit to Stand   Level of [place]: Sit/Stand moderate assist (50% patients effort)   Physical Assist/Nonphysical Assist: Sit/Stand 2 persons   Assistive Device for Transfer: Sit/Stand rolling walker   Toileting   Level of [place]: Toilet maximum assist (25% patients effort)   Physical Assist/Nonphysical Assist: Toilet 2 person assist   Clinical Impression   Criteria for Skilled Therapeutic Interventions Met yes, treatment indicated   OT Diagnosis sepsis, weakness   Influenced by the following impairments pain and weakness   Assessment of Occupational Performance 1-3 Performance Deficits   Identified Performance  Deficits mobility and self care    Clinical Decision Making (Complexity) Low complexity   Therapy Frequency Daily   Predicted Duration of Therapy Intervention (days/wks) 2-3 days    Anticipated Equipment Needs at Discharge shower chair   Anticipated Discharge Disposition Transitional Care Facility   Risks and Benefits of Treatment have been explained. Yes   Patient, Family & other staff in agreement with plan of care Yes   Total Evaluation Time   Total Evaluation Time (Minutes) 15

## 2019-10-29 NOTE — PROGRESS NOTES
[facility]    Hospitalist Progress Note      Assessment & Plan   Niru Durham is a 87 year old female who was admitted on 10/28/2019 for fever and sepsis, source of infection pneumonia.     Sepsis (H),   CAP, present on admission.  Blood culture no growth after 1 day.  Influenza a B and RSV negative.  Still spiking fever.  T-max 100.8 overnight.  Leukocytosis is improving.  -Continue to follow blood culture.  -Send sputum culture.  -stop IVF  -continue ceftriaxone and azithro  -diet as tolerated  -tylenol prn pain/fever  -oxygen as needed to maintain sats >92%.   -ambulate as able.  Requires assistance and walker  -PT/OT  -nebs prn       Cognitive impairment, noted. Currently living alone.        Hypertension. BP normal here. Not on any antihypertensives prior to admit. Noted. Stable.     DVT Prophylaxis: Enoxaparin (Lovenox) SQ  Code Status: Full Code    Yoselyn Zuñiga    Interval History   Still with fevers overnight.  Feeling a little bit better today.  Denies abdominal pain or dysuria.  No headache.  Tylenol is helping with her fever symptoms.  Her family is at bedside and all questions are answered to the best of my ability.  No further vomiting.  Tolerated breakfast well without issues.  Slept well.    -Data reviewed today: I reviewed all new labs and imaging results over the last 24 hours. I personally reviewed no images or EKG's today.    Physical Exam   Temp: 99.8  F (37.7  C) Temp src: Tympanic BP: 136/58 Pulse: 88 Heart Rate: 86 Resp: 24 SpO2: 94 % O2 Device: Nasal cannula Oxygen Delivery: 1 LPM  Vitals:    10/28/19 0431 10/28/19 0816 10/29/19 0213   Weight: 93.4 kg (206 lb) 92.4 kg (203 lb 11.2 oz) 93.2 kg (205 lb 8 oz)     Vital Signs with Ranges  Temp:  [99.1  F (37.3  C)-101.3  F (38.5  C)] 99.8  F (37.7  C)  Pulse:  [88] 88  Heart Rate:  [85-94] 86  Resp:  [16-28] 24  BP: (116-140)/(50-60) 136/58  SpO2:  [89 %-94 %] 94 %  I/O last 3 completed shifts:  In: 2220 [P.O.:720;  I.V.:1500]  Out: 100 [Urine:100]    Constitutional: Awake alert and oriented.  Appears acutely ill but in no distress.  Respiratory: wet.  No wheezing or obvious rhonchi.  Cardiovascular: Regular rate.  No murmur.  GI: Abdomen soft, nontender, nondistended.  Bowel sounds present.  Skin/Integumen: Warm, dry, intact.  Age-related skin changes.  Other:      Medications     sodium chloride 75 mL/hr at 10/29/19 0337       aspirin  81 mg Oral Daily     atorvastatin  10 mg Oral Daily     azithromycin  500 mg Intravenous Q24H     cefTRIAXone  2 g Intravenous Q24H     enoxaparin ANTICOAGULANT  40 mg Subcutaneous Q24H     famotidine  20 mg Oral Daily     oxybutynin ER  5 mg Oral Daily     sodium chloride (PF)  3 mL Intracatheter Q8H     sodium chloride (PF)  3 mL Intracatheter Q8H       Data   Recent Labs   Lab 10/29/19  0420 10/28/19  0450   WBC 15.2* 23.4*   HGB 11.1* 13.3   MCV 92 91    306    136   POTASSIUM 3.9 3.8   CHLORIDE 104 99   CO2 27 27   BUN 14 20   CR 0.75 0.80   ANIONGAP 7 10   ОЛЬГА 8.7 10.7*   * 189*   ALBUMIN  --  4.7   PROTTOTAL  --  8.5   BILITOTAL  --  0.6   ALKPHOS  --  79   ALT  --  23   AST  --  47*       No results found for this or any previous visit (from the past 24 hour(s)).

## 2019-10-29 NOTE — PROGRESS NOTES
":    Met with patient to discuss discharge planning.  Patient is hopeful that she can return home at discharge but states \" I need to walk.\"  We discussed home care services and short term rehab.  Patient was agreeable to a referral to short term rehab in the event that she does not improve enough to return home.  She was offered a list of local options and chose [facility].  If she is able to return home she was open to home care.  She was offered local choices and selected [facility]'s home care.      Notified Smita in admissions at [facility] of new referral.  She is aware patient's insurance is [employer].  Faxed referral information and additional PT/OT notes for Human to review.    A message was left for [facility] notifying of new referral.  Faxed referral.      Anticipated discharge in a few days.    MARGAUX Perdomo on 10/29/2019 at 1:23 PM      "

## 2019-10-29 NOTE — PROGRESS NOTES
"0312/0312-01  Niru Durham 87 year old female   Admission date:10/28/2019   Residence: Comes from Home alone. Son lives close by.  Code status: Full Code   Isolation:No active isolations   Principal Problem:Sepsis (H)   Post Op Day #: NA  Peds:No  [place]: NA  Diet: regular  Mobility Status: EZ Stand  Discharge timeline & plan: unsure of discharge date and/or discharge needs at this time.  Vital signs:  Temp: 100.8  F (38.2  C) Temp src: Tympanic BP: 136/58 Pulse: 88 Heart Rate: 86 Resp: 24 SpO2: 91 % O2 Device: Nasal cannula Oxygen Delivery: 1 LPM Height: 157.5 cm (5' 2\") Weight: 93.2 kg (205 lb 8 oz)  Estimated body mass index is 37.59 kg/m  as calculated from the following:    Height as of this encounter: 1.575 m (5' 2\").    Weight as of this encounter: 93.2 kg (205 lb 8 oz).  Abnormal Physical Assessment:Lung sounds coarse throughout lobes. SpO2 decreased to 89% RA. 1L O2 administered via nasal cannula. SpO2 increased to 91%. Temp of 99.1-101.3. Abrasion to right shin. Numbness and tingling in right lower extremity. +1 edema in legs bilaterally.  Last Pain/PRN Medication given:Tylenol 0221  Finger stick POCT blood sugars:NA  Labs:   Your basic metabolic panel results:  Lab Results   Component Value Date     10/29/2019       (Sodium/Salt)  Lab Results   Component Value Date    POTASSIUM 3.9 10/29/2019     Lab Results   Component Value Date     10/29/2019       (Glucose/Blood Sugar/Diabetic Screen)  Lab Results   Component Value Date    ОЛЬГА 8.7 10/29/2019       (Calcium)  Lab Results   Component Value Date    CR 0.75 10/29/2019       (Kidney Function)   Telemetry: No  Pending tests/procedures planned:NA  Comments:      SAFETY CHECKLIST  Arm Bands/Risk clasps correct and in place (DNR, Fall risk, Allergy, Latex, Limb):  Yes  IVF and rate ordered correct: Yes  Physical assessment verification(IV site, wounds, dressing intact, incisions, LDA's, neuro, CIWA...): Yes  Environmental assessment " (bed/chair alarm on, call light, side rails, restraints, sitter....): Yes  Whiteboard updated by oncoming RN:Yes    Rayna Mcconnell RN on 10/29/2019 at 6:14 AM    Bedside Handoff complete, safety checks verified by writer and are correct.    Report received from Rayna Kim RN on 10/29/2019 at 7:02 AM

## 2019-10-29 NOTE — PROGRESS NOTES
".0312/0312-01  Niru Durham 87 year old female   Admission date:10/28/2019   Residence: Comes from home; Son lives close by   Code status: Full Code   Isolation:No active isolations   Principal Problem:Sepsis (H)   Post Op Day #: NA  Peds:YES, Not applicable  [place]: NA  Diet: regular  Mobility Status: A1   Discharge timeline & plan: unsure of discharge date and/or discharge needs at this time.  Vital signs:  Temp: 100  F (37.8  C) Temp src: Tympanic BP: (!) 145/60 Pulse: 99 Heart Rate: 84 Resp: 24 SpO2: 93 % O2 Device: None (Room air) Oxygen Delivery: 1 LPM Height: 157.5 cm (5' 2\") Weight: 93.2 kg (205 lb 8 oz)  Estimated body mass index is 37.59 kg/m  as calculated from the following:    Height as of this encounter: 1.575 m (5' 2\").    Weight as of this encounter: 93.2 kg (205 lb 8 oz).  Abnormal Physical Assessment:  Abrasion to right shin; redness/bruising. Mild numbness in RLE, improving since yesterday in right lower extremity, +1 edema in legs bilaterally.       Last Pain/PRN Medication given:  Tylenol at 1112    Finger stick POCT blood sugars:NA  Labs: repeat Potassium in 10/29 AM, ordered.   Telemetry: No  Pending tests/procedures planned: unknown   Comments:    Patient has had a temp between 100-101.1; given tylenol for pain.        SAFETY CHECKLIST  Arm Bands/Risk clasps correct and in place (DNR, Fall risk, Allergy, Latex, Limb):  Yes  IVF and rate ordered correct: Yes  Physical assessment verification(IV site, wounds, dressing intact, incisions, LDA's, neuro, CIWA...): Yes  Environmental assessment (bed/chair alarm on, call light, side rails, restraints, sitter....): Yes  Whiteboard updated by oncoming RN:Yes    Herb Kim RN on 10/29/2019 at 4:37 PM  Bedside Handoff complete, safety checks verified by writer and are correct.  Regina Jones RN on 10/29/2019 at 7:11 PM      "

## 2019-10-29 NOTE — PROGRESS NOTES
"Pt. Is comfortable in bed on a right side laying position. Febrile at 100.0.   BP (!) 145/60 (BP Location: Left arm)   Pulse 99   Temp 100  F (37.8  C) (Tympanic)   Resp 24   Ht 1.575 m (5' 2\")   Wt 93.2 kg (205 lb 8 oz)   SpO2 93%   BMI 37.59 kg/m      "

## 2019-10-30 ENCOUNTER — APPOINTMENT (OUTPATIENT)
Dept: PHYSICAL THERAPY | Facility: OTHER | Age: 84
DRG: 871 | End: 2019-10-30
Payer: COMMERCIAL

## 2019-10-30 ENCOUNTER — APPOINTMENT (OUTPATIENT)
Dept: OCCUPATIONAL THERAPY | Facility: OTHER | Age: 84
DRG: 871 | End: 2019-10-30
Payer: COMMERCIAL

## 2019-10-30 LAB
BASOPHILS # BLD AUTO: 0 10E9/L (ref 0–0.2)
BASOPHILS NFR BLD AUTO: 0.3 %
DIFFERENTIAL METHOD BLD: ABNORMAL
EOSINOPHIL # BLD AUTO: 0.1 10E9/L (ref 0–0.7)
EOSINOPHIL NFR BLD AUTO: 0.8 %
ERYTHROCYTE [DISTWIDTH] IN BLOOD BY AUTOMATED COUNT: 14.6 % (ref 10–15)
HCT VFR BLD AUTO: 35.1 % (ref 35–47)
HGB BLD-MCNC: 11.1 G/DL (ref 11.7–15.7)
IMM GRANULOCYTES # BLD: 0.1 10E9/L (ref 0–0.4)
IMM GRANULOCYTES NFR BLD: 0.6 %
LYMPHOCYTES # BLD AUTO: 1.3 10E9/L (ref 0.8–5.3)
LYMPHOCYTES NFR BLD AUTO: 10.8 %
MAGNESIUM SERPL-MCNC: 2.1 MG/DL (ref 1.9–2.7)
MCH RBC QN AUTO: 29.1 PG (ref 26.5–33)
MCHC RBC AUTO-ENTMCNC: 31.6 G/DL (ref 31.5–36.5)
MCV RBC AUTO: 92 FL (ref 78–100)
MONOCYTES # BLD AUTO: 0.8 10E9/L (ref 0–1.3)
MONOCYTES NFR BLD AUTO: 6.4 %
NEUTROPHILS # BLD AUTO: 9.6 10E9/L (ref 1.6–8.3)
NEUTROPHILS NFR BLD AUTO: 81.1 %
PLATELET # BLD AUTO: 219 10E9/L (ref 150–450)
POTASSIUM SERPL-SCNC: 4.1 MMOL/L (ref 3.5–5.1)
RBC # BLD AUTO: 3.82 10E12/L (ref 3.8–5.2)
WBC # BLD AUTO: 11.9 10E9/L (ref 4–11)

## 2019-10-30 PROCEDURE — 25000128 H RX IP 250 OP 636: Performed by: FAMILY MEDICINE

## 2019-10-30 PROCEDURE — 83735 ASSAY OF MAGNESIUM: CPT | Performed by: FAMILY MEDICINE

## 2019-10-30 PROCEDURE — 97530 THERAPEUTIC ACTIVITIES: CPT | Mod: GP

## 2019-10-30 PROCEDURE — 85025 COMPLETE CBC W/AUTO DIFF WBC: CPT | Performed by: FAMILY MEDICINE

## 2019-10-30 PROCEDURE — 36415 COLL VENOUS BLD VENIPUNCTURE: CPT | Performed by: FAMILY MEDICINE

## 2019-10-30 PROCEDURE — 97530 THERAPEUTIC ACTIVITIES: CPT | Mod: GO | Performed by: OCCUPATIONAL THERAPIST

## 2019-10-30 PROCEDURE — 97535 SELF CARE MNGMENT TRAINING: CPT | Mod: GO | Performed by: OCCUPATIONAL THERAPIST

## 2019-10-30 PROCEDURE — 25000132 ZZH RX MED GY IP 250 OP 250 PS 637: Performed by: FAMILY MEDICINE

## 2019-10-30 PROCEDURE — 12000000 ZZH R&B MED SURG/OB

## 2019-10-30 PROCEDURE — 99231 SBSQ HOSP IP/OBS SF/LOW 25: CPT | Performed by: FAMILY MEDICINE

## 2019-10-30 PROCEDURE — 97116 GAIT TRAINING THERAPY: CPT | Mod: GP

## 2019-10-30 PROCEDURE — 84132 ASSAY OF SERUM POTASSIUM: CPT | Performed by: FAMILY MEDICINE

## 2019-10-30 RX ADMIN — ASPIRIN 81 MG: 81 TABLET, COATED ORAL at 10:07

## 2019-10-30 RX ADMIN — AZITHROMYCIN MONOHYDRATE 500 MG: 500 INJECTION, POWDER, LYOPHILIZED, FOR SOLUTION INTRAVENOUS at 07:52

## 2019-10-30 RX ADMIN — ATORVASTATIN CALCIUM 10 MG: 10 TABLET, FILM COATED ORAL at 10:08

## 2019-10-30 RX ADMIN — ACETAMINOPHEN 650 MG: 325 TABLET, FILM COATED ORAL at 07:52

## 2019-10-30 RX ADMIN — TRAMADOL HYDROCHLORIDE 50 MG: 50 TABLET, FILM COATED ORAL at 03:08

## 2019-10-30 RX ADMIN — POLYETHYLENE GLYCOL 3350 17 G: 17 POWDER, FOR SOLUTION ORAL at 21:53

## 2019-10-30 RX ADMIN — ACETAMINOPHEN 650 MG: 325 TABLET, FILM COATED ORAL at 18:21

## 2019-10-30 RX ADMIN — OXYBUTYNIN CHLORIDE 5 MG: 5 TABLET, EXTENDED RELEASE ORAL at 10:09

## 2019-10-30 RX ADMIN — CEFTRIAXONE SODIUM 2 G: 2 INJECTION, POWDER, FOR SOLUTION INTRAMUSCULAR; INTRAVENOUS at 05:26

## 2019-10-30 RX ADMIN — SENNOSIDES AND DOCUSATE SODIUM 2 TABLET: 8.6; 5 TABLET ORAL at 03:08

## 2019-10-30 RX ADMIN — FAMOTIDINE 20 MG: 20 TABLET ORAL at 10:08

## 2019-10-30 RX ADMIN — ENOXAPARIN SODIUM 40 MG: 40 INJECTION SUBCUTANEOUS at 07:51

## 2019-10-30 RX ADMIN — SENNOSIDES AND DOCUSATE SODIUM 2 TABLET: 8.6; 5 TABLET ORAL at 21:53

## 2019-10-30 ASSESSMENT — ACTIVITIES OF DAILY LIVING (ADL)
ADLS_ACUITY_SCORE: 21
ADLS_ACUITY_SCORE: 21
ADLS_ACUITY_SCORE: 20
ADLS_ACUITY_SCORE: 21

## 2019-10-30 NOTE — PROGRESS NOTES
"Patient is comfortable in bed lying on right side. Denies pain. LS coarse, ex wheeze. Redness on face - chronic.   BP (!) 172/69 (BP Location: Left arm)   Pulse 85   Temp 100.8  F (38.2  C) (Tympanic)   Resp 20   Ht 1.575 m (5' 2\")   Wt 93.2 kg (205 lb 8 oz)   SpO2 93%   BMI 37.59 kg/m      Herb Kim RN on 10/30/2019 at 6:40 PM    "

## 2019-10-30 NOTE — PROGRESS NOTES
:    Follow up call to [facility].  Spoke with Lorin.  They are reviewing the referral to see if they are able to accept.  Awaiting response.     MARGAUX Perdomo on 10/30/2019 at 9:09 AM    Addendum:    Met with patient to review discharge plans.  Patient's son and daughter-in-law were also present.  Patient would like to go to home with home care from [facility].   Family is able to stay with patient for awhile to help.  Son is able to transport at discharge.  Awaiting response from [facility].    MARGAUX Perdomo on 10/30/2019 at 10:08 AM

## 2019-10-30 NOTE — PROGRESS NOTES
"0312/0312-01  Niru Durham 87 year old female   Admission date:10/28/2019   Residence: Home   Code status: Full Code   Isolation:No active isolations   Principal Problem:Sepsis (H)   Post Op Day #: NA  Peds:Not applicable  [place]:NA  Diet: regular  Mobility Status:  Assist of 1 with walker.   Discharge timeline & plan: progressing to discharge, potentially tomorrow, with home care.    Vital signs:  BP (!) 172/69 (BP Location: Left arm)   Pulse 85   Temp 100.8  F (38.2  C) (Tympanic)   Resp 20   Ht 1.575 m (5' 2\")   Wt 93.2 kg (205 lb 8 oz)   SpO2 93%   BMI 37.59 kg/m        Height: 157.5 cm (5' 2\") Weight: 93.2 kg (205 lb 8 oz)  Estimated body mass index is 37.59 kg/m  as calculated from the following:    Height as of this encounter: 1.575 m (5' 2\").    Weight as of this encounter: 93.2 kg (205 lb 8 oz).  Abnormal Physical Assessment:  Pain in right shoulder, chronic. LS Slight ex wheezing.         Last Pain/PRN Medication given: Tylenol at 1819 or temp.   Finger stick POCT blood sugars: NA  Labs: no pending labs, yet to collect sputum sample.  Telemetry: No  Pending tests/procedures planned: NA  Comments:      SAFETY CHECKLIST  Arm Bands/Risk clasps correct and in place (DNR, Fall risk, Allergy, Latex, Limb):  Yes  IVF and rate ordered correct: Yes  Physical assessment verification(IV site, wounds, dressing intact, incisions, LDA's, neuro, CIWA...): Yes  Environmental assessment (bed/chair alarm on, call light, side rails, restraints, sitter....): Yes  Whiteboard updated by oncoming RN:Yes    Herb Kim RN on 10/30/2019 at 6:20 PM    Bedside Handoff complete, safety checks verified by writer and are correct.    Stephania Kim RN on 10/30/2019 at 7:20 PM                "

## 2019-10-30 NOTE — PROGRESS NOTES
[facility]    Hospitalist Progress Note      Assessment & Plan   Niru Durham is a 87 year old female who was admitted on 10/28/2019 for fever and sepsis, source of infection pneumonia.     Sepsis (H),   CAP, present on admission.  Blood culture no growth after 2 day.  Influenza A/B and RSV negative.  T-max 100.9 overnight.  Leukocytosis is improving.  -Continue to follow blood culture.  -Send sputum culture.  -stop IVF  -continue ceftriaxone and azithro  -diet as tolerated  -tylenol prn pain/fever  -oxygen as needed to maintain sats >92%.   -ambulate as able.  Requires assistance and walker  -PT/OT  -nebs prn       Cognitive impairment, noted. Currently living alone.        Hypertension. BP normal here. Not on any antihypertensives prior to admit. Noted. Stable.     DVT Prophylaxis: Enoxaparin (Lovenox) SQ  Code Status: Full Code    Yoselyn Wrayn    Interval History   Still with fevers overnight, but feeling better today.  Denies abdominal pain or dysuria.  No headache.  Tylenol is helping with her fever symptoms.  Her family is at bedside and all questions are answered to the best of my ability.  No further vomiting.  Tolerated breakfast well without issues.  Slept well. Would be open to [facility].     -Data reviewed today: I reviewed all new labs and imaging results over the last 24 hours. I personally reviewed no images or EKG's today.    Physical Exam   Temp: 99  F (37.2  C) Temp src: Tympanic BP: (!) 161/59 Pulse: 83 Heart Rate: 80 Resp: 18 SpO2: 93 % O2 Device: None (Room air)    Vitals:    10/28/19 0431 10/28/19 0816 10/29/19 0213   Weight: 93.4 kg (206 lb) 92.4 kg (203 lb 11.2 oz) 93.2 kg (205 lb 8 oz)     Vital Signs with Ranges  Temp:  [98  F (36.7  C)-101.1  F (38.4  C)] 99  F (37.2  C)  Pulse:  [83-99] 83  Heart Rate:  [80-85] 80  Resp:  [18-26] 18  BP: (133-161)/(52-85) 161/59  SpO2:  [91 %-93 %] 93 %  I/O last 3 completed shifts:  In: 1512 [P.O.:960; I.V.:552]  Out: 1060  [Urine:1060]    Constitutional: Awake alert and oriented.  Appears well and in no distress.  Respiratory: CTA B/L -wr/r  Cardiovascular: Regular rate.  No murmur.  GI: Abdomen soft, nontender, nondistended.  Bowel sounds present.  Skin/Integumen: Warm, dry, intact.  Age-related skin changes. Bruising of the R anterior lower leg.   Other:      Medications       aspirin  81 mg Oral Daily     atorvastatin  10 mg Oral Daily     azithromycin  500 mg Intravenous Q24H     cefTRIAXone  2 g Intravenous Q24H     enoxaparin ANTICOAGULANT  40 mg Subcutaneous Q24H     famotidine  20 mg Oral Daily     oxybutynin ER  5 mg Oral Daily     sodium chloride (PF)  3 mL Intracatheter Q8H     sodium chloride (PF)  3 mL Intracatheter Q8H       Data   Recent Labs   Lab 10/30/19  0520 10/29/19  0420 10/28/19  0450   WBC  --  15.2* 23.4*   HGB  --  11.1* 13.3   MCV  --  92 91   PLT  --  234 306   NA  --  138 136   POTASSIUM 4.1 3.9 3.8   CHLORIDE  --  104 99   CO2  --  27 27   BUN  --  14 20   CR  --  0.75 0.80   ANIONGAP  --  7 10   ОЛЬГА  --  8.7 10.7*   GLC  --  150* 189*   ALBUMIN  --   --  4.7   PROTTOTAL  --   --  8.5   BILITOTAL  --   --  0.6   ALKPHOS  --   --  79   ALT  --   --  23   AST  --   --  47*       No results found for this or any previous visit (from the past 24 hour(s)).

## 2019-10-30 NOTE — PROGRESS NOTES
:    Follow up call to [facility].  Left a message with a discharge update, and inquired if they can accept patient or if she will need to be referred to another agency.  Requested a call back.    MARGAUX Perdomo on 10/30/2019 at 3:28 PM

## 2019-10-30 NOTE — PROGRESS NOTES
"0312/0312-01  Niru Durham 87 year old female   Admission date:10/28/2019   Residence: Home  Code status: Full Code   Isolation:No active isolations   Principal Problem:Sepsis (H)   Post Op Day #: NA  Diet: regular  Mobility Status: Assist of 2 and walker to walk, assist of 1 pivot to commode.  Discharge timeline & plan: unsure of discharge date and/or discharge needs at this time.  Vital signs:  Temp: 99  F (37.2  C) Temp src: Tympanic BP: (!) 161/59 Pulse: 83 Heart Rate: 80 Resp: 18 SpO2: 93 % O2 Device: None (Room air) Oxygen Delivery: 1 LPM Height: 157.5 cm (5' 2\") Weight: 93.2 kg (205 lb 8 oz)  Estimated body mass index is 37.59 kg/m  as calculated from the following:    Height as of this encounter: 1.575 m (5' 2\").    Weight as of this encounter: 93.2 kg (205 lb 8 oz).  Abnormal Physical Assessment:Abrasion to right shin; redness/bruising. Mild numbness in RLE +2 edema in back and BLE. Lung sounds coarse and diminished throughout, dyspnea upon exertion.  Temp 100.8, 8/10 back pain.  Last Pain/PRN Medication given:Tylenol 2113, Tramadol 0308  :  Labs: see AM labs  Telemetry: No  Pending tests/procedures planned:NA  Comments:      SAFETY CHECKLIST  Arm Bands/Risk clasps correct and in place (DNR, Fall risk, Allergy, Latex, Limb):  Yes  IVF and rate ordered correct: saline Lock  Physical assessment verification(IV site, wounds, dressing intact, incisions, LDA's, neuro, CIWA...): Yes  Environmental assessment (bed/chair alarm on, call light, side rails, restraints, sitter....): Yes  Whiteboard updated by oncoming RN:Yes    Bedside Handoff complete, safety checks verified by writer and are correct.  Report received from Regina Kim RN on 10/30/2019 at 7:07 AM            "

## 2019-10-31 ENCOUNTER — APPOINTMENT (OUTPATIENT)
Dept: PHYSICAL THERAPY | Facility: OTHER | Age: 84
DRG: 871 | End: 2019-10-31
Payer: COMMERCIAL

## 2019-10-31 ENCOUNTER — APPOINTMENT (OUTPATIENT)
Dept: OCCUPATIONAL THERAPY | Facility: OTHER | Age: 84
DRG: 871 | End: 2019-10-31
Payer: COMMERCIAL

## 2019-10-31 VITALS
BODY MASS INDEX: 37.54 KG/M2 | WEIGHT: 204 LBS | HEIGHT: 62 IN | TEMPERATURE: 97.9 F | SYSTOLIC BLOOD PRESSURE: 114 MMHG | HEART RATE: 91 BPM | OXYGEN SATURATION: 92 % | DIASTOLIC BLOOD PRESSURE: 71 MMHG | RESPIRATION RATE: 20 BRPM

## 2019-10-31 LAB
ANION GAP SERPL CALCULATED.3IONS-SCNC: 8 MMOL/L (ref 3–14)
BUN SERPL-MCNC: 12 MG/DL (ref 7–25)
CALCIUM SERPL-MCNC: 8.8 MG/DL (ref 8.6–10.3)
CHLORIDE SERPL-SCNC: 103 MMOL/L (ref 98–107)
CO2 SERPL-SCNC: 29 MMOL/L (ref 21–31)
CREAT SERPL-MCNC: 0.64 MG/DL (ref 0.6–1.2)
ERYTHROCYTE [DISTWIDTH] IN BLOOD BY AUTOMATED COUNT: 14.4 % (ref 10–15)
GFR SERPL CREATININE-BSD FRML MDRD: 88 ML/MIN/{1.73_M2}
GLUCOSE SERPL-MCNC: 128 MG/DL (ref 70–105)
HCT VFR BLD AUTO: 35.6 % (ref 35–47)
HGB BLD-MCNC: 11.3 G/DL (ref 11.7–15.7)
MAGNESIUM SERPL-MCNC: 2 MG/DL (ref 1.9–2.7)
MCH RBC QN AUTO: 28.7 PG (ref 26.5–33)
MCHC RBC AUTO-ENTMCNC: 31.7 G/DL (ref 31.5–36.5)
MCV RBC AUTO: 90 FL (ref 78–100)
PLATELET # BLD AUTO: 230 10E9/L (ref 150–450)
POTASSIUM SERPL-SCNC: 3.9 MMOL/L (ref 3.5–5.1)
RBC # BLD AUTO: 3.94 10E12/L (ref 3.8–5.2)
SODIUM SERPL-SCNC: 140 MMOL/L (ref 134–144)
WBC # BLD AUTO: 8.6 10E9/L (ref 4–11)

## 2019-10-31 PROCEDURE — 87086 URINE CULTURE/COLONY COUNT: CPT | Performed by: FAMILY MEDICINE

## 2019-10-31 PROCEDURE — 85027 COMPLETE CBC AUTOMATED: CPT | Performed by: FAMILY MEDICINE

## 2019-10-31 PROCEDURE — 83735 ASSAY OF MAGNESIUM: CPT | Performed by: FAMILY MEDICINE

## 2019-10-31 PROCEDURE — 25000128 H RX IP 250 OP 636: Performed by: FAMILY MEDICINE

## 2019-10-31 PROCEDURE — 80048 BASIC METABOLIC PNL TOTAL CA: CPT | Performed by: FAMILY MEDICINE

## 2019-10-31 PROCEDURE — 25000132 ZZH RX MED GY IP 250 OP 250 PS 637: Performed by: FAMILY MEDICINE

## 2019-10-31 PROCEDURE — 99239 HOSP IP/OBS DSCHRG MGMT >30: CPT | Performed by: FAMILY MEDICINE

## 2019-10-31 PROCEDURE — 40000275 ZZH STATISTIC RCP TIME EA 10 MIN

## 2019-10-31 PROCEDURE — 97530 THERAPEUTIC ACTIVITIES: CPT | Mod: GP

## 2019-10-31 PROCEDURE — 36415 COLL VENOUS BLD VENIPUNCTURE: CPT | Performed by: FAMILY MEDICINE

## 2019-10-31 PROCEDURE — 97116 GAIT TRAINING THERAPY: CPT | Mod: GP

## 2019-10-31 PROCEDURE — 97535 SELF CARE MNGMENT TRAINING: CPT | Mod: GO | Performed by: OCCUPATIONAL THERAPIST

## 2019-10-31 RX ORDER — ALBUTEROL SULFATE 0.83 MG/ML
2.5 SOLUTION RESPIRATORY (INHALATION) EVERY 6 HOURS PRN
Qty: 3 ML | Refills: 0 | Status: ON HOLD | OUTPATIENT
Start: 2019-10-31 | End: 2022-01-01

## 2019-10-31 RX ORDER — AMOXICILLIN 875 MG
875 TABLET ORAL 2 TIMES DAILY
Qty: 10 TABLET | Refills: 0 | Status: SHIPPED | OUTPATIENT
Start: 2019-10-31 | End: 2019-11-05

## 2019-10-31 RX ORDER — AZITHROMYCIN 250 MG/1
250 TABLET, FILM COATED ORAL DAILY
Qty: 3 TABLET | Refills: 0 | Status: SHIPPED | OUTPATIENT
Start: 2019-10-31 | End: 2019-11-03

## 2019-10-31 RX ADMIN — ASPIRIN 81 MG: 81 TABLET, COATED ORAL at 09:57

## 2019-10-31 RX ADMIN — AZITHROMYCIN MONOHYDRATE 500 MG: 500 INJECTION, POWDER, LYOPHILIZED, FOR SOLUTION INTRAVENOUS at 07:52

## 2019-10-31 RX ADMIN — SENNOSIDES AND DOCUSATE SODIUM 2 TABLET: 8.6; 5 TABLET ORAL at 11:40

## 2019-10-31 RX ADMIN — OXYBUTYNIN CHLORIDE 5 MG: 5 TABLET, EXTENDED RELEASE ORAL at 09:58

## 2019-10-31 RX ADMIN — CEFTRIAXONE SODIUM 2 G: 2 INJECTION, POWDER, FOR SOLUTION INTRAMUSCULAR; INTRAVENOUS at 05:16

## 2019-10-31 RX ADMIN — MAGNESIUM SULFATE HEPTAHYDRATE 2 G: 40 INJECTION, SOLUTION INTRAVENOUS at 06:46

## 2019-10-31 RX ADMIN — ENOXAPARIN SODIUM 40 MG: 40 INJECTION SUBCUTANEOUS at 07:53

## 2019-10-31 RX ADMIN — POLYETHYLENE GLYCOL 3350 17 G: 17 POWDER, FOR SOLUTION ORAL at 11:40

## 2019-10-31 RX ADMIN — ATORVASTATIN CALCIUM 10 MG: 10 TABLET, FILM COATED ORAL at 09:57

## 2019-10-31 RX ADMIN — POTASSIUM CHLORIDE 20 MEQ: 1500 TABLET, EXTENDED RELEASE ORAL at 06:46

## 2019-10-31 RX ADMIN — FAMOTIDINE 20 MG: 20 TABLET ORAL at 09:56

## 2019-10-31 ASSESSMENT — MIFFLIN-ST. JEOR: SCORE: 1313.59

## 2019-10-31 ASSESSMENT — ACTIVITIES OF DAILY LIVING (ADL)
ADLS_ACUITY_SCORE: 19

## 2019-10-31 NOTE — PROGRESS NOTES
Patient discharged to [facility] via [facility] transportation (daughter is their ). Discharge instructions provided to family and [facility].  F/U appt made, Patient also has an appintment made with her PCP - Darlene Ramos at [facility] for November 8th. I explained to her that if this appointment is thorough and she tells Darlene about this hospitalization (to look at records) she could cancel her [facility] f/u appt. But should keep it for now until she sees her PCP. Belongings sent with patient.     Caty Pierce RN on 10/31/2019 at 1:25 PM

## 2019-10-31 NOTE — PHARMACY - DISCHARGE MEDICATION RECONCILIATION
[facility]  Part of [address] [address]    October 31, 2019    Dear Pharmacist,    Your customer, Niru Durham, born on 11/4/1931, was recently discharged from [facility].  We have updated her medication list and want to alert you to the following:       Review of your medicines      START taking      Dose / Directions   albuterol (2.5 MG/3ML) 0.083% neb solution  Commonly known as:  PROVENTIL  Used for:  Pneumonia due to infectious organism, unspecified laterality, unspecified part of lung      Dose:  2.5 mg  Take 1 vial (2.5 mg) by nebulization every 6 hours as needed for shortness of breath / dyspnea or wheezing  Quantity:  3 mL  Refills:  0     amoxicillin 875 MG tablet  Commonly known as:  AMOXIL  Used for:  Pneumonia due to infectious organism, unspecified laterality, unspecified part of lung      Dose:  875 mg  Take 1 tablet (875 mg) by mouth 2 times daily for 5 days  Quantity:  10 tablet  Refills:  0     azithromycin 250 MG tablet  Commonly known as:  ZITHROMAX  Used for:  Pneumonia due to infectious organism, unspecified laterality, unspecified part of lung      Dose:  250 mg  Take 1 tablet (250 mg) by mouth daily for 3 days  Quantity:  3 tablet  Refills:  0        CONTINUE these medicines which have NOT CHANGED      Dose / Directions   acetaminophen 500 MG tablet  Commonly known as:  TYLENOL      Dose:  1,000 mg  Take 1,000 mg by mouth every 8 hours as needed (shoulder pain)  Refills:  0     alendronate 70 MG tablet  Commonly known as:  FOSAMAX      Dose:  70 mg  Take 70 mg by mouth every 7 days  Refills:  0     aspirin 81 MG EC tablet      Dose:  81 mg  Take 81 mg by mouth daily  Refills:  0     ketoconazole 2 % external cream  Commonly known as:  NIZORAL      Apply topically daily as needed for itching or irritation  Refills:  0     nitroGLYcerin 0.4 MG sublingual tablet  Commonly known as:  NITROSTAT      Dose:   0.4 mg  Place 0.4 mg under the tongue every 5 minutes as needed for chest pain  Refills:  0     oxybutynin ER 10 MG 24 hr tablet  Commonly known as:  DITROPAN-XL      TAKE 1 TABLET BY MOUTH EVERY DAY. DO NOT CRUSH  Refills:  0     pravastatin 40 MG tablet  Commonly known as:  PRAVACHOL      Dose:  40 mg  Take 40 mg by mouth daily (with lunch)  Refills:  0           Where to get your medicines      These medications were sent to [id number] - [place], [place] - [address], [facility] [address]    Phone:  [phone]     albuterol (2.5 MG/3ML) 0.083% neb solution    amoxicillin 875 MG tablet    azithromycin 250 MG tablet         We also reviewed Niru Durham's allergy list and updated it as needed:  Allergies: Codeine and Naproxen    Thank you for continuing to care for Niru Durham.  We look forward to working together with you in the future.    Sincerely,  Flor Medina, [facility]

## 2019-10-31 NOTE — PHARMACY - DISCHARGE MEDICATION RECONCILIATION AND EDUCATION
Pharmacy: Discharge Counseling and Medication Reconciliation    Niru Durham  [address]  [place] [address]  [phone] (home)   87 year old female  PCP:Maria Teresa Richards    Allergies   Allergen Reactions     Codeine Nausea     Naproxen Nausea and Vomiting       Discharge Counseling:    Pharmacist met with patient (and/or family) today to review the medication portion of the After Visit Summary (with an emphasis on NEW medications) and to address patient's questions/concerns.     Summary of Education:   Met with patient at time of discharge to review all changes in medications including new albuterol, amoxicillin, and azithromycin. Discussed indications, directions for use, and possible side effects. Patient asked a few questions and all concerns were addressed. Advised patient that [facility] will be managing all medications.     Materials Provided:   MedCounselor sheets printed from Clinical Pharmacology on: albuterol, amoxicillin and azithromycin    Discharge Medication Reconciliation:    Flor Medina RPH has reviewed the patient's discharge medication orders and has compared them to the inpatient medication administration record and to what the patient was taking prior to admission- any discrepancies have been resolved.     It has been determined that the patient has an adequate supply of medications available or which can be obtained from the patient's preferred pharmacy, which has been confirmed as: [facility]. [An updated medication list will be faxed to the patient's pharmacy.]     Thank you for the consult.     Flor Medina RPH ....................  10/31/2019   3:26 PM

## 2019-10-31 NOTE — PROGRESS NOTES
:    Michele Ordoñez at [facility] approved for SNF if patient decided to go with SNF.    Received a voicemail from Lorin at [facility].  They can accept patient but cannot admit until early next week, November 5th.    MARGAUX Perdomo on 10/31/2019 at 9:11 AM

## 2019-10-31 NOTE — PROGRESS NOTES
Incentive Spirometry education completed.  Pt goal 1900 mls.  Pt achieved 500 mls.  Pt instructed to perform 10/hr while awake with at least one deep breath and cough per hour until able to perform baseline activity.  RT will follow and re-assess as need.      Areli Luke, RT on 10/31/2019 at 8:54 AM

## 2019-10-31 NOTE — PROGRESS NOTES
"0312/0312-01  Niru Durham 87 year old female   Admission date:10/28/2019   Residence: Home alone  Code status: Full Code   Isolation:No active isolations   Principal Problem:Sepsis (H)   Post Op Day #: NA  Peds:Not applicable  [place]: [place]  Diet: regular  Mobility Status: Ax1 with walker  Discharge timeline & plan: progressing to discharge. No additional resources anticipated.  Vital signs:  Temp: 99.4  F (37.4  C) Temp src: Tympanic BP: 134/48 Pulse: 85 Heart Rate: 79 Resp: 24 SpO2: 92 % O2 Device: None (Room air) Oxygen Delivery: 1 LPM Height: 157.5 cm (5' 2\") Weight: 92.5 kg (204 lb)  Estimated body mass index is 37.31 kg/m  as calculated from the following:    Height as of this encounter: 1.575 m (5' 2\").    Weight as of this encounter: 92.5 kg (204 lb).  Abnormal Physical Assessment:Spiked fever of 100.6 last evening. Now 99.4. Reddened bruise to RLE due to recent fall. C/o constipation-2 Senna & Miralax given @ HS with no results yet. LS dim. Face blotchy--patient states this is not chronic for her. Urinary incontinence. RLE +1 edema  Last Pain/PRN Medication given:none  Finger stick POCT blood sugars:none  Labs: pending  Telemetry: No  Pending tests/procedures planned:none  Comments: Chronic RLE numbness. A&O but forgetful at times. Possible discharge today with home care.      SAFETY CHECKLIST  Arm Bands/Risk clasps correct and in place (DNR, Fall risk, Allergy, Latex, Limb):  Yes  IVF and rate ordered correct: SL  Physical assessment verification(IV site, wounds, dressing intact, incisions, LDA's, neuro, CIWA...): Yes  Environmental assessment (bed/chair alarm on, call light, side rails, restraints, sitter....): Yes  Whiteboard updated by oncoming RN:Yes    Stephania Kim RN on 10/31/2019 at 5:09 AM          Bedside Handoff complete, safety checks verified by writer and are correct.    Caty Pierce RN on 10/31/2019 at 7:11 AM      "

## 2019-10-31 NOTE — PROGRESS NOTES
:    Notified Smita at [facility] that patient has opted to go to [facility].  Awaiting discharge time.    MARGAUX Perdomo on 10/31/2019 at 11:26 AM        Addendum:    Discharge transportation scheduled for 1300.      Completed PAS Screening.  PAS confirmation number  [id number].    Left a message for Lorin at [facility] to cancel patient's home care referral as patient is going to SNF.      MARGAUX Perdomo on 10/31/2019 at 12:22 PM

## 2019-10-31 NOTE — DISCHARGE SUMMARY
[facility]    Discharge Summary  Hospitalist    Date of Admission:  10/28/2019  Date of Discharge:  10/31/2019  Discharging Provider: Yoselyn Zuñiga  Date of Service (when I saw the patient): 10/31/19    Discharge Diagnoses   Principal Problem:    Sepsis (H) (10/28/2019), present on admission.   Active Problems:    Cognitive impairment (1/6/2019)    Hypertension (10/22/2015)    Urinary tract infection (10/28/2019), excluded.   Pneumonia, present on admission.       History of Present Illness   Niru Durham is an 87 year old female who presented with fever and sepsis.     Hospital Course   Niru Durham was admitted on 10/28/2019.  The following problems were addressed during her hospitalization:    Sepsis (H).  present on admission.  Thought potentially due to urinary tract infection however, there were only a few bacteria and otherwise normal urinalysis.  There was some blunting on chest x-ray.  Community-acquired pneumonia, present on admission.  She was treated with ceftriaxone and azithromycin.  She had improvement in her symptoms.  She did not require any oxygen.  She was noted to be weaker than her baseline and recommended to go to skilled nursing facility which she has agreed to.  Blood cultures no growth at 2 days.  Leukocytosis is resolved.  Urine culture and sensitivity pending at this time.  T-max of 100.6 prior to discharge.       Cognitive impairment, noted. Currently living alone.        Hypertension. BP initially normal though she did have episodes where her systolic blood pressure went up to 160 and 170.  Not on any antihypertensives prior to admit.  Will need continued monitoring.  However she also has very normal to low blood pressure around 114/71 at other times.  If she has persistently elevated blood pressures could consider antihypertensive therapy.    Bruise of R leg. Present on admission. Stable.     Yoselyn Zuñiga, DO    Significant Results and Procedures    n/a    Pending Results   These results will be followed up by PCP/NH physician.   Unresulted Labs Ordered in the Past 30 Days of this Admission     Date and Time Order Name Status Description    10/28/2019 0552 Blood culture Preliminary     10/28/2019 0552 Blood culture Preliminary           Code Status   Full Code       Primary Care Physician   Maria Teresa Fatima    Physical Exam   Temp: 97.9  F (36.6  C) Temp src: Tympanic BP: 114/71 Pulse: 91 Heart Rate: 79 Resp: 20 SpO2: 92 % O2 Device: None (Room air)    Vitals:    10/28/19 0816 10/29/19 0213 10/31/19 0206   Weight: 92.4 kg (203 lb 11.2 oz) 93.2 kg (205 lb 8 oz) 92.5 kg (204 lb)     Vital Signs with Ranges  Temp:  [97.8  F (36.6  C)-100.8  F (38.2  C)] 97.9  F (36.6  C)  Pulse:  [77-91] 91  Heart Rate:  [79-85] 79  Resp:  [18-24] 20  BP: (114-172)/(46-71) 114/71  SpO2:  [92 %-93 %] 92 %  I/O last 3 completed shifts:  In: 871 [P.O.:720; I.V.:151]  Out: 1550 [Urine:1550]    Constitutional: Awake alert and oriented.  No acute distress.  Appears stated age.  Eyes: Extraocular muscles intact.  Nonicteric, noninjected.  ENT: Moist because membranes.  Tongue protrudes midline.  No oral pharyngeal lesions.  Respiratory: Coarse on left side otherwise clear.  No rales.  No lower extremity edema.  Cardiovascular: Regular rate.  GI: Abdomen soft, nontender, nondistended  SKIN: Warm and dry.  There is an area of bruising to the right anterior lower extremity which is present on admission and stable.    Discharge Disposition   Discharged to rehabilitation facility  Condition at discharge: Stable    Consultations This Hospital Stay   PHYSICAL THERAPY ADULT IP CONSULT  OCCUPATIONAL THERAPY ADULT IP CONSULT  SOCIAL WORK IP CONSULT  PHYSICAL THERAPY ADULT IP CONSULT  OCCUPATIONAL THERAPY ADULT IP CONSULT    Time Spent on this Encounter   Yoselyn REARDON DO, personally saw the patient today and spent greater than 30 minutes discharging this patient.    Discharge Orders       General info for SNF    Length of Stay Estimate: Short Term Care: Estimated # of Days <30  Condition at Discharge: Stable  Level of care:skilled   Rehabilitation Potential: Excellent  Admission H&P remains valid and up-to-date: Yes  Recent Chemotherapy: N/A  Use Nursing Home Standing Orders: Yes     Mantoux instructions    Give two-step Mantoux (PPD) Per Facility Policy Yes     Reason for your hospital stay    pneumonia     Daily weights    Call Provider for weight gain of more than 2 pounds per day or 5 pounds per week.     Activity - Up ad thelma     Follow Up and recommended labs and tests    Follow up with Nursing home physician.  No follow up labs or test are needed.     Full Code     Physical Therapy Adult Consult    Evaluate and treat as clinically indicated.    Reason:  weakness     Occupational Therapy Adult Consult    Evaluate and treat as clinically indicated.    Reason:  weakness     Fall precautions     Advance Diet as Tolerated    Follow this diet upon discharge: Orders Placed This Encounter      Combination Diet Regular Diet Adult     Discharge Medications   Current Discharge Medication List      START taking these medications    Details   albuterol (PROVENTIL) (2.5 MG/3ML) 0.083% neb solution Take 1 vial (2.5 mg) by nebulization every 6 hours as needed for shortness of breath / dyspnea or wheezing  Qty: 3 mL, Refills: 0    Associated Diagnoses: Pneumonia due to infectious organism, unspecified laterality, unspecified part of lung      amoxicillin (AMOXIL) 875 MG tablet Take 1 tablet (875 mg) by mouth 2 times daily for 5 days  Qty: 10 tablet, Refills: 0    Associated Diagnoses: Pneumonia due to infectious organism, unspecified laterality, unspecified part of lung      azithromycin (ZITHROMAX) 250 MG tablet Take 1 tablet (250 mg) by mouth daily for 3 days  Qty: 3 tablet, Refills: 0    Associated Diagnoses: Pneumonia due to infectious organism, unspecified laterality, unspecified part of lung          CONTINUE these medications which have NOT CHANGED    Details   acetaminophen (TYLENOL) 500 MG tablet Take 1,000 mg by mouth every 8 hours as needed (shoulder pain)      alendronate (FOSAMAX) 70 MG tablet Take 70 mg by mouth every 7 days      aspirin 81 MG EC tablet Take 81 mg by mouth daily      oxybutynin ER (DITROPAN-XL) 10 MG 24 hr tablet TAKE 1 TABLET BY MOUTH EVERY DAY. DO NOT CRUSH      pravastatin (PRAVACHOL) 40 MG tablet Take 40 mg by mouth daily (with lunch)       ketoconazole (NIZORAL) 2 % external cream Apply topically daily as needed for itching or irritation       nitroGLYcerin (NITROSTAT) 0.4 MG sublingual tablet Place 0.4 mg under the tongue         STOP taking these medications       traMADol (ULTRAM) 50 MG tablet Comments:   Reason for Stopping:             Allergies   Allergies   Allergen Reactions     Codeine Nausea     Naproxen Nausea and Vomiting     Data   Most Recent 3 CBC's:  Recent Labs   Lab Test 10/31/19  0525 10/30/19  0520 10/29/19  0420   WBC 8.6 11.9* 15.2*   HGB 11.3* 11.1* 11.1*   MCV 90 92 92    219 234      Most Recent 3 BMP's:  Recent Labs   Lab Test 10/31/19  0525 10/30/19  0520 10/29/19  0420 10/28/19  0450     --  138 136   POTASSIUM 3.9 4.1 3.9 3.8   CHLORIDE 103  --  104 99   CO2 29  --  27 27   BUN 12  --  14 20   CR 0.64  --  0.75 0.80   ANIONGAP 8  --  7 10   ОЛЬГА 8.8  --  8.7 10.7*   *  --  150* 189*     Most Recent 2 LFT's:  Recent Labs   Lab Test 10/28/19  0450 10/07/14  1831   AST 47*  --    ALT 23  --    ALKPHOS 79 71   BILITOTAL 0.6 0.4     Most Recent INR's and Anticoagulation Dosing History:  Anticoagulation Dose History     There is no flowsheet data to display.        Most Recent 3 Troponin's:No lab results found.  Most Recent Cholesterol Panel:  Recent Labs   Lab Test 10/07/14  1831   LDL 91   HDL 60   TRIG 148     Most Recent 6 Bacteria Isolates From Any Culture (See EPIC Reports for Culture Details):  Recent Labs   Lab Test 10/28/19  0600    CULT No growth after 2 days  No growth after 2 days     Most Recent TSH, T4 and A1c Labs:No lab results found.  Results for orders placed or performed during the hospital encounter of 10/28/19   XR Chest 2 Views    Narrative    PROCEDURE:  XR CHEST 2 VW    HISTORY:  fever.     COMPARISON:  4/2/2014    FINDINGS:   The cardiac silhouette is normal in size. The pulmonary vasculature is  normal.  There are mild airspace opacities in both lung bases. There  are trace pleural effusions. No pleural effusion or pneumothorax.      Impression    IMPRESSION:  Bibasilar atelectasis or infiltrate and small pleural  effusions.      WOLF JANSEN MD

## 2019-11-02 LAB
BACTERIA SPEC CULT: NORMAL
SPECIMEN SOURCE: NORMAL

## 2019-11-03 LAB
BACTERIA SPEC CULT: NORMAL
BACTERIA SPEC CULT: NORMAL
SPECIMEN SOURCE: NORMAL
SPECIMEN SOURCE: NORMAL

## 2019-11-05 ENCOUNTER — TELEPHONE (OUTPATIENT)
Dept: FAMILY MEDICINE | Facility: OTHER | Age: 84
End: 2019-11-05

## 2019-11-05 NOTE — TELEPHONE ENCOUNTER
Calling to let [facility] know that pt had a fall. She has a hematoma on the back of her head. States there aren't any other injuries on the rest of her and that they are doing neurological checks for the next 24 hours

## 2019-12-11 NOTE — PLAN OF CARE
"Pt A & O x4, temp 100.8, face flushed, gave PRN tylenol, see MAR, temp now 99.8, B/P elevated BP (!) 161/59 (BP Location: Left arm)   Pulse 83   Temp 99  F (37.2  C) (Tympanic)   Resp 18   Ht 1.575 m (5' 2\")   Wt 93.2 kg (205 lb 8 oz)   SpO2 93%   BMI 37.59 kg/m    . Pt HR irregular. Lung sounds coarse and diminished throughout, dyspnea upon exertion, O2 93% on RA. Bowel sounds active throughout, denies nausea, reports feeling constipated, gave PRN Senna, see MAR. +2 edema in back and +1 in BLE. Right shin red and bruised. Pt reports 8/10 back pain, PRN tramadol, will continue to monitor. Regina Jones RN on 10/30/2019 at 3:29 AM   "
"Pt is up in chair with assist of 1 with walker with moderate difficulty. LS have slight ex wheezes. A & O, denies pain. Afebrile. Brought to Bathroom with assist of 1 with walker. Will discharge tomorrow with home care.     /58 (BP Location: Left arm)   Pulse 85   Temp 98.6  F (37  C) (Oral)   Resp 18   Ht 1.575 m (5' 2\")   Wt 93.2 kg (205 lb 8 oz)   SpO2 93%   BMI 37.59 kg/m      "
"Pt was able to transfer to bed side commode with gait belt and A1. Lungs coarse and diminished in bilateral LL. +1 edema in LE.  Right lower leg has redness/bruising on shin. Numbness but no tingling in right leg, Pt reports that this has improved since yesterday. Brief was wet this morning.     /85 (BP Location: Left arm)   Pulse 88   Temp 99.5  F (37.5  C) (Tympanic)   Resp 24   Ht 1.575 m (5' 2\")   Wt 93.2 kg (205 lb 8 oz)   SpO2 91%   BMI 37.59 kg/m      Herb Kim RN on 10/29/2019 at 9:17 AM    "
Discharge Planner OT   Patient plan for discharge: pt did not fully state   Current status: Pt reports feeling very weak and having pain in right leg, was unable to transfer with nursing so stand lift was use for transfer to commode and recliner. Once in recliner pt was able to move sit to stand with mod assist of 2 using FWW and stand for a few seconds before fatigue.   Barriers to return to prior living situation: lives alone, level of assist needed for all mobility and cares   Recommendations for discharge: short term rehab for on-going therapies   Rationale for recommendations: see above        Entered by: Shelbie Mueller 10/29/2019 8:58 AM        
Discharge Planner OT   Patient plan for discharge: pt did not fully state during session   Current status: Pt moving somewhat better today vs yesterday, but bed mobility was moderate assist of 2, sit to stand min assist of 2 for safety, pt took a few steps with rolling walker to recliner at bedside with min assist of 2. Pt continues to report feeling weak, needed assist for grooming as she was unable to reach her head with right shoulder due to past injury.   Barriers to return to prior living situation: weakness, decreased endurance for function and assist needed for mobility and self cares   Recommendations for discharge: pt would benefit from a short term rehab stay, or 24 hour assist from family and home care therapies.   Rationale for recommendations: see above        Entered by: Shelbie Mueller 10/29/2019 12:47 PM       
Discharge Planner OT   Patient plan for discharge: pt wants to return home with family assist and home care therapies   Current status: Pt motivated to get up and move in hallway and room, pt ambulated with 4WW and CGA of 1 in hallway and into bathroom. Pt needed CGA to transfer to toilet and min assist to complete sponge bathing, max assist for charleen area bathing while in standing.   Barriers to return to prior living situation: lives alone but will have family stay with her for period of time, weakness and history of falls.   Recommendations for discharge: home with 24 hour assist from family and home care therapies.   Rationale for recommendations: see above        Entered by: Shelbie Mueller 10/30/2019 3:25 PM       
Discharge Planner PT   Patient plan for discharge: return home  Current status: minimal assist to CGA for mobility using a 4ww for ambulation  Barriers to return to prior living situation: fatigue  Recommendations for discharge: continued PT  Rationale for recommendations: to promote strength, stability and increased functional activity tolerance.       Entered by: Javi Braun 10/30/2019 4:06 PM      
Discharge Planner PT   Patient plan for discharge: return home when able and appropriate  Current status: moderate assist for bed mobilities; minimal assist of 2 for transfers and short ambulation with Fww  Barriers to return to prior living situation: weakness and fatigue  Recommendations for discharge: continued PT  Rationale for recommendations: to promote return to patient's prior level of independent functional mobility.       Entered by: Javi Braun 10/29/2019 12:49 PM      
Discharge Planner PT   Patient plan for discharge: short term rehab  Current status: patient unable to stand, transfer or ambulate: total mechanical lift necessary for transfers  Barriers to return to prior living situation: weakness, fatigue  Recommendations for discharge: continued PT  Rationale for recommendations: to promote patient's highest level of functional mobility.       Entered by: Javi Braun 10/28/2019 3:44 PM      
Occupational Therapy Discharge Summary    Reason for therapy discharge:    Discharged to transitional care facility.    Progress towards therapy goal(s). See goals on Care Plan in [facility] electronic health record for goal details.  Goals partially met.  Barriers to achieving goals:   discharge from facility.    Therapy recommendation(s):    Continued therapy is recommended.  Rationale/Recommendations:  on going therapies to maximize level of independence for pt to safely return home as previous.      
Patient up to comm\[facility]\"". She is max assist of two to transfer. She isn't able to move her legs well. She complained of shivers temp 101. Given tylenol for comfort. Decreased appetite. Taking fluids well.  
Physical Therapy Discharge Summary    Reason for therapy discharge:    Discharged to transitional care facility.    Progress towards therapy goal(s). See goals on Care Plan in [facility] electronic health record for goal details.  Goals partially met.  Barriers to achieving goals:   discharge from facility.    Therapy recommendation(s):    Continued therapy is recommended.  Rationale/Recommendations:  to promote strength, stability and return to safe, independent mobility.      
Pt VS Temp: 99.8  F (37.7  C) Temp src: Tympanic BP: (!) 140/50 Pulse: 88 Heart Rate: 94 Resp: 28 SpO2: 90 % O2 Device: None (Room air)    A&O X4. Denies pain.  Lungs coarse. Skin intact ex slight trace edema to lower extremities and edema to upper back. Right lower leg has redness to shin. Numbness and tingling in right leg. Using EZ stand for transfers. Caty Pereira RN on 10/28/2019 at 7:46 PM    
Spiked a temp beginning of shift of 100.6. Last temp 99.4. RR 22-24. Denies pain. Large reddened bruise on RLE remains unchanged from recent fall. 2 Senna and Miralax given @ HS for c/o constipation. No results thus far. Incontinent of urine during night. Face is blotchy and red, which patient states is not normal for her. Will continue to monitor and intervene as needed. Stephania Kim RN on 10/31/2019 at 3:40 AM    
No

## 2021-02-25 ENCOUNTER — IMMUNIZATION (OUTPATIENT)
Dept: FAMILY MEDICINE | Facility: OTHER | Age: 86
End: 2021-02-25
Attending: FAMILY MEDICINE
Payer: COMMERCIAL

## 2021-02-25 PROCEDURE — 91300 PR COVID VAC PFIZER DIL RECON 30 MCG/0.3 ML IM: CPT

## 2021-03-18 ENCOUNTER — IMMUNIZATION (OUTPATIENT)
Dept: FAMILY MEDICINE | Facility: OTHER | Age: 86
End: 2021-03-18
Attending: FAMILY MEDICINE
Payer: COMMERCIAL

## 2021-03-18 PROCEDURE — 91300 PR COVID VAC PFIZER DIL RECON 30 MCG/0.3 ML IM: CPT

## 2022-01-01 ENCOUNTER — APPOINTMENT (OUTPATIENT)
Dept: GENERAL RADIOLOGY | Facility: OTHER | Age: 87
End: 2022-01-01
Attending: FAMILY MEDICINE
Payer: COMMERCIAL

## 2022-01-01 ENCOUNTER — PATIENT OUTREACH (OUTPATIENT)
Dept: FAMILY MEDICINE | Facility: OTHER | Age: 87
End: 2022-01-01

## 2022-01-01 ENCOUNTER — APPOINTMENT (OUTPATIENT)
Dept: OCCUPATIONAL THERAPY | Facility: OTHER | Age: 87
DRG: 280 | End: 2022-01-01
Payer: COMMERCIAL

## 2022-01-01 ENCOUNTER — APPOINTMENT (OUTPATIENT)
Dept: GENERAL RADIOLOGY | Facility: OTHER | Age: 87
DRG: 280 | End: 2022-01-01
Attending: FAMILY MEDICINE
Payer: COMMERCIAL

## 2022-01-01 ENCOUNTER — APPOINTMENT (OUTPATIENT)
Dept: CARDIOLOGY | Facility: OTHER | Age: 87
DRG: 280 | End: 2022-01-01
Attending: FAMILY MEDICINE
Payer: COMMERCIAL

## 2022-01-01 ENCOUNTER — HOSPITAL ENCOUNTER (INPATIENT)
Facility: OTHER | Age: 87
LOS: 6 days | Discharge: NURSING, REHAB OR LONG-TERM CARE | DRG: 280 | End: 2022-09-27
Attending: FAMILY MEDICINE | Admitting: INTERNAL MEDICINE
Payer: COMMERCIAL

## 2022-01-01 ENCOUNTER — APPOINTMENT (OUTPATIENT)
Dept: OCCUPATIONAL THERAPY | Facility: OTHER | Age: 87
DRG: 280 | End: 2022-01-01
Attending: FAMILY MEDICINE
Payer: COMMERCIAL

## 2022-01-01 ENCOUNTER — APPOINTMENT (OUTPATIENT)
Dept: PHYSICAL THERAPY | Facility: OTHER | Age: 87
DRG: 280 | End: 2022-01-01
Payer: COMMERCIAL

## 2022-01-01 ENCOUNTER — APPOINTMENT (OUTPATIENT)
Dept: PHYSICAL THERAPY | Facility: OTHER | Age: 87
DRG: 280 | End: 2022-01-01
Attending: FAMILY MEDICINE
Payer: COMMERCIAL

## 2022-01-01 ENCOUNTER — HOSPITAL ENCOUNTER (EMERGENCY)
Facility: OTHER | Age: 87
Discharge: HOME | End: 2022-09-30
Attending: FAMILY MEDICINE | Admitting: FAMILY MEDICINE
Payer: COMMERCIAL

## 2022-01-01 VITALS
BODY MASS INDEX: 35.67 KG/M2 | OXYGEN SATURATION: 92 % | WEIGHT: 195 LBS | DIASTOLIC BLOOD PRESSURE: 70 MMHG | SYSTOLIC BLOOD PRESSURE: 165 MMHG | RESPIRATION RATE: 18 BRPM | HEART RATE: 86 BPM | TEMPERATURE: 100.5 F

## 2022-01-01 VITALS
WEIGHT: 195.3 LBS | BODY MASS INDEX: 35.94 KG/M2 | RESPIRATION RATE: 18 BRPM | HEIGHT: 62 IN | TEMPERATURE: 97.9 F | HEART RATE: 89 BPM | SYSTOLIC BLOOD PRESSURE: 124 MMHG | OXYGEN SATURATION: 94 % | DIASTOLIC BLOOD PRESSURE: 75 MMHG

## 2022-01-01 DIAGNOSIS — N30.01 ACUTE CYSTITIS WITH HEMATURIA: ICD-10-CM

## 2022-01-01 DIAGNOSIS — Z11.52 ENCOUNTER FOR SCREENING LABORATORY TESTING FOR COVID-19 VIRUS: ICD-10-CM

## 2022-01-01 DIAGNOSIS — I21.4 NSTEMI (NON-ST ELEVATED MYOCARDIAL INFARCTION) (H): ICD-10-CM

## 2022-01-01 DIAGNOSIS — K59.00 CONSTIPATION, UNSPECIFIED CONSTIPATION TYPE: ICD-10-CM

## 2022-01-01 LAB
ALBUMIN SERPL-MCNC: 4 G/DL (ref 3.5–5.7)
ALBUMIN UR-MCNC: NEGATIVE MG/DL
ALBUMIN UR-MCNC: NEGATIVE MG/DL
ALP SERPL-CCNC: 93 U/L (ref 34–104)
ALT SERPL W P-5'-P-CCNC: 24 U/L (ref 7–52)
ANION GAP SERPL CALCULATED.3IONS-SCNC: 10 MMOL/L (ref 3–14)
ANION GAP SERPL CALCULATED.3IONS-SCNC: 7 MMOL/L (ref 3–14)
ANION GAP SERPL CALCULATED.3IONS-SCNC: 9 MMOL/L (ref 3–14)
APPEARANCE UR: ABNORMAL
APPEARANCE UR: CLEAR
AST SERPL W P-5'-P-CCNC: 32 U/L (ref 13–39)
ATRIAL RATE - MUSE: 78 BPM
BACTERIA #/AREA URNS HPF: ABNORMAL /HPF
BACTERIA UR CULT: ABNORMAL
BASOPHILS # BLD AUTO: 0 10E3/UL (ref 0–0.2)
BASOPHILS # BLD AUTO: 0 10E3/UL (ref 0–0.2)
BASOPHILS NFR BLD AUTO: 0 %
BASOPHILS NFR BLD AUTO: 0 %
BI-PLANE LVEF ECHO: NORMAL
BILIRUB SERPL-MCNC: 0.6 MG/DL (ref 0.3–1)
BILIRUB UR QL STRIP: NEGATIVE
BILIRUB UR QL STRIP: NEGATIVE
BUN SERPL-MCNC: 16 MG/DL (ref 7–25)
BUN SERPL-MCNC: 19 MG/DL (ref 7–25)
BUN SERPL-MCNC: 21 MG/DL (ref 7–25)
CALCIUM SERPL-MCNC: 10 MG/DL (ref 8.6–10.3)
CALCIUM SERPL-MCNC: 10.1 MG/DL (ref 8.6–10.3)
CALCIUM SERPL-MCNC: 9.3 MG/DL (ref 8.6–10.3)
CALCIUM SERPL-MCNC: 9.6 MG/DL (ref 8.6–10.3)
CALCIUM SERPL-MCNC: 9.7 MG/DL (ref 8.6–10.3)
CHLORIDE BLD-SCNC: 100 MMOL/L (ref 98–107)
CHLORIDE BLD-SCNC: 101 MMOL/L (ref 98–107)
CHLORIDE BLD-SCNC: 102 MMOL/L (ref 98–107)
CHLORIDE BLD-SCNC: 103 MMOL/L (ref 98–107)
CHLORIDE BLD-SCNC: 99 MMOL/L (ref 98–107)
CO2 SERPL-SCNC: 27 MMOL/L (ref 21–31)
CO2 SERPL-SCNC: 29 MMOL/L (ref 21–31)
CO2 SERPL-SCNC: 29 MMOL/L (ref 21–31)
CO2 SERPL-SCNC: 30 MMOL/L (ref 21–31)
CO2 SERPL-SCNC: 31 MMOL/L (ref 21–31)
COLOR UR AUTO: ABNORMAL
COLOR UR AUTO: ABNORMAL
CREAT SERPL-MCNC: 0.66 MG/DL (ref 0.6–1.2)
CREAT SERPL-MCNC: 0.69 MG/DL (ref 0.6–1.2)
CREAT SERPL-MCNC: 0.72 MG/DL (ref 0.6–1.2)
CREAT SERPL-MCNC: 0.78 MG/DL (ref 0.6–1.2)
CREAT SERPL-MCNC: 0.8 MG/DL (ref 0.6–1.2)
DIASTOLIC BLOOD PRESSURE - MUSE: NORMAL MMHG
EOSINOPHIL # BLD AUTO: 0.1 10E3/UL (ref 0–0.7)
EOSINOPHIL # BLD AUTO: 0.1 10E3/UL (ref 0–0.7)
EOSINOPHIL NFR BLD AUTO: 1 %
EOSINOPHIL NFR BLD AUTO: 1 %
ERYTHROCYTE [DISTWIDTH] IN BLOOD BY AUTOMATED COUNT: 14.4 % (ref 10–15)
ERYTHROCYTE [DISTWIDTH] IN BLOOD BY AUTOMATED COUNT: 14.5 % (ref 10–15)
ERYTHROCYTE [DISTWIDTH] IN BLOOD BY AUTOMATED COUNT: 14.5 % (ref 10–15)
ERYTHROCYTE [DISTWIDTH] IN BLOOD BY AUTOMATED COUNT: 14.6 % (ref 10–15)
ERYTHROCYTE [DISTWIDTH] IN BLOOD BY AUTOMATED COUNT: 14.6 % (ref 10–15)
FLUAV RNA SPEC QL NAA+PROBE: NEGATIVE
FLUBV RNA RESP QL NAA+PROBE: NEGATIVE
GFR SERPL CREATININE-BSD FRML MDRD: 70 ML/MIN/1.73M2
GFR SERPL CREATININE-BSD FRML MDRD: 72 ML/MIN/1.73M2
GFR SERPL CREATININE-BSD FRML MDRD: 79 ML/MIN/1.73M2
GFR SERPL CREATININE-BSD FRML MDRD: 82 ML/MIN/1.73M2
GFR SERPL CREATININE-BSD FRML MDRD: 83 ML/MIN/1.73M2
GLUCOSE BLD-MCNC: 113 MG/DL (ref 70–105)
GLUCOSE BLD-MCNC: 126 MG/DL (ref 70–105)
GLUCOSE BLD-MCNC: 135 MG/DL (ref 70–105)
GLUCOSE BLD-MCNC: 147 MG/DL (ref 70–105)
GLUCOSE BLD-MCNC: 173 MG/DL (ref 70–105)
GLUCOSE UR STRIP-MCNC: NEGATIVE MG/DL
GLUCOSE UR STRIP-MCNC: NEGATIVE MG/DL
HCT VFR BLD AUTO: 35.9 % (ref 35–47)
HCT VFR BLD AUTO: 37.3 % (ref 35–47)
HCT VFR BLD AUTO: 37.7 % (ref 35–47)
HCT VFR BLD AUTO: 38.4 % (ref 35–47)
HCT VFR BLD AUTO: 40.4 % (ref 35–47)
HGB BLD-MCNC: 11.7 G/DL (ref 11.7–15.7)
HGB BLD-MCNC: 12.2 G/DL (ref 11.7–15.7)
HGB BLD-MCNC: 12.4 G/DL (ref 11.7–15.7)
HGB BLD-MCNC: 12.5 G/DL (ref 11.7–15.7)
HGB BLD-MCNC: 13.3 G/DL (ref 11.7–15.7)
HGB UR QL STRIP: NEGATIVE
HGB UR QL STRIP: NEGATIVE
HOLD SPECIMEN: NORMAL
HYALINE CASTS: 1 /LPF
IMM GRANULOCYTES # BLD: 0 10E3/UL
IMM GRANULOCYTES # BLD: 0.1 10E3/UL
IMM GRANULOCYTES NFR BLD: 0 %
IMM GRANULOCYTES NFR BLD: 1 %
INTERPRETATION ECG - MUSE: NORMAL
KETONES UR STRIP-MCNC: NEGATIVE MG/DL
KETONES UR STRIP-MCNC: NEGATIVE MG/DL
LEUKOCYTE ESTERASE UR QL STRIP: ABNORMAL
LEUKOCYTE ESTERASE UR QL STRIP: NEGATIVE
LIPASE SERPL-CCNC: 6 U/L (ref 11–82)
LVEF ECHO: NORMAL
LYMPHOCYTES # BLD AUTO: 0.7 10E3/UL (ref 0.8–5.3)
LYMPHOCYTES # BLD AUTO: 1.2 10E3/UL (ref 0.8–5.3)
LYMPHOCYTES NFR BLD AUTO: 16 %
LYMPHOCYTES NFR BLD AUTO: 8 %
MCH RBC QN AUTO: 28.9 PG (ref 26.5–33)
MCH RBC QN AUTO: 28.9 PG (ref 26.5–33)
MCH RBC QN AUTO: 29.2 PG (ref 26.5–33)
MCH RBC QN AUTO: 29.3 PG (ref 26.5–33)
MCH RBC QN AUTO: 29.3 PG (ref 26.5–33)
MCHC RBC AUTO-ENTMCNC: 32.6 G/DL (ref 31.5–36.5)
MCHC RBC AUTO-ENTMCNC: 32.6 G/DL (ref 31.5–36.5)
MCHC RBC AUTO-ENTMCNC: 32.7 G/DL (ref 31.5–36.5)
MCHC RBC AUTO-ENTMCNC: 32.9 G/DL (ref 31.5–36.5)
MCHC RBC AUTO-ENTMCNC: 32.9 G/DL (ref 31.5–36.5)
MCV RBC AUTO: 88 FL (ref 78–100)
MCV RBC AUTO: 89 FL (ref 78–100)
MCV RBC AUTO: 90 FL (ref 78–100)
MONOCYTES # BLD AUTO: 0.5 10E3/UL (ref 0–1.3)
MONOCYTES # BLD AUTO: 0.6 10E3/UL (ref 0–1.3)
MONOCYTES NFR BLD AUTO: 6 %
MONOCYTES NFR BLD AUTO: 7 %
MUCOUS THREADS #/AREA URNS LPF: PRESENT /LPF
MUCOUS THREADS #/AREA URNS LPF: PRESENT /LPF
NEUTROPHILS # BLD AUTO: 5.5 10E3/UL (ref 1.6–8.3)
NEUTROPHILS # BLD AUTO: 7.4 10E3/UL (ref 1.6–8.3)
NEUTROPHILS NFR BLD AUTO: 77 %
NEUTROPHILS NFR BLD AUTO: 83 %
NITRATE UR QL: NEGATIVE
NITRATE UR QL: POSITIVE
NRBC # BLD AUTO: 0 10E3/UL
NRBC # BLD AUTO: 0 10E3/UL
NRBC BLD AUTO-RTO: 0 /100
NRBC BLD AUTO-RTO: 0 /100
NT-PROBNP SERPL-MCNC: 262 PG/ML (ref 0–100)
NT-PROBNP SERPL-MCNC: 446 PG/ML (ref 0–100)
P AXIS - MUSE: 62 DEGREES
PH UR STRIP: 6 [PH] (ref 5–9)
PH UR STRIP: 6.5 [PH] (ref 5–9)
PLATELET # BLD AUTO: 238 10E3/UL (ref 150–450)
PLATELET # BLD AUTO: 246 10E3/UL (ref 150–450)
PLATELET # BLD AUTO: 251 10E3/UL (ref 150–450)
PLATELET # BLD AUTO: 269 10E3/UL (ref 150–450)
PLATELET # BLD AUTO: 342 10E3/UL (ref 150–450)
POTASSIUM BLD-SCNC: 3.9 MMOL/L (ref 3.5–5.1)
POTASSIUM BLD-SCNC: 4.2 MMOL/L (ref 3.5–5.1)
POTASSIUM BLD-SCNC: 4.3 MMOL/L (ref 3.5–5.1)
PR INTERVAL - MUSE: 170 MS
PROT SERPL-MCNC: 8.1 G/DL (ref 6.4–8.9)
QRS DURATION - MUSE: 102 MS
QT - MUSE: 440 MS
QTC - MUSE: 501 MS
R AXIS - MUSE: -54 DEGREES
RBC # BLD AUTO: 4.01 10E6/UL (ref 3.8–5.2)
RBC # BLD AUTO: 4.22 10E6/UL (ref 3.8–5.2)
RBC # BLD AUTO: 4.23 10E6/UL (ref 3.8–5.2)
RBC # BLD AUTO: 4.32 10E6/UL (ref 3.8–5.2)
RBC # BLD AUTO: 4.54 10E6/UL (ref 3.8–5.2)
RBC URINE: 1 /HPF
RBC URINE: 3 /HPF
RSV RNA SPEC NAA+PROBE: NEGATIVE
SARS-COV-2 RNA RESP QL NAA+PROBE: NEGATIVE
SODIUM SERPL-SCNC: 137 MMOL/L (ref 134–144)
SODIUM SERPL-SCNC: 138 MMOL/L (ref 134–144)
SODIUM SERPL-SCNC: 138 MMOL/L (ref 134–144)
SODIUM SERPL-SCNC: 139 MMOL/L (ref 134–144)
SODIUM SERPL-SCNC: 139 MMOL/L (ref 134–144)
SP GR UR STRIP: 1.01 (ref 1–1.03)
SP GR UR STRIP: 1.01 (ref 1–1.03)
SQUAMOUS EPITHELIAL: 4 /HPF
SYSTOLIC BLOOD PRESSURE - MUSE: NORMAL MMHG
T AXIS - MUSE: 227 DEGREES
TROPONIN I SERPL-MCNC: 2876.3 PG/ML (ref 0–34)
TROPONIN I SERPL-MCNC: 31.5 PG/ML (ref 0–34)
TROPONIN I SERPL-MCNC: 6915.4 PG/ML (ref 0–34)
TROPONIN I SERPL-MCNC: 7177 PG/ML (ref 0–34)
TROPONIN I SERPL-MCNC: 7256.3 PG/ML (ref 0–34)
UROBILINOGEN UR STRIP-MCNC: NORMAL MG/DL
UROBILINOGEN UR STRIP-MCNC: NORMAL MG/DL
VENTRICULAR RATE- MUSE: 78 BPM
WBC # BLD AUTO: 7.3 10E3/UL (ref 4–11)
WBC # BLD AUTO: 7.3 10E3/UL (ref 4–11)
WBC # BLD AUTO: 7.9 10E3/UL (ref 4–11)
WBC # BLD AUTO: 8.3 10E3/UL (ref 4–11)
WBC # BLD AUTO: 9 10E3/UL (ref 4–11)
WBC URINE: 1 /HPF
WBC URINE: 29 /HPF

## 2022-01-01 PROCEDURE — 93306 TTE W/DOPPLER COMPLETE: CPT | Mod: 26 | Performed by: INTERNAL MEDICINE

## 2022-01-01 PROCEDURE — 250N000011 HC RX IP 250 OP 636: Performed by: INTERNAL MEDICINE

## 2022-01-01 PROCEDURE — 71046 X-RAY EXAM CHEST 2 VIEWS: CPT

## 2022-01-01 PROCEDURE — 83880 ASSAY OF NATRIURETIC PEPTIDE: CPT | Performed by: FAMILY MEDICINE

## 2022-01-01 PROCEDURE — 99223 1ST HOSP IP/OBS HIGH 75: CPT | Mod: AI | Performed by: INTERNAL MEDICINE

## 2022-01-01 PROCEDURE — 120N000001 HC R&B MED SURG/OB

## 2022-01-01 PROCEDURE — 96374 THER/PROPH/DIAG INJ IV PUSH: CPT | Performed by: FAMILY MEDICINE

## 2022-01-01 PROCEDURE — 80048 BASIC METABOLIC PNL TOTAL CA: CPT | Performed by: INTERNAL MEDICINE

## 2022-01-01 PROCEDURE — 87637 SARSCOV2&INF A&B&RSV AMP PRB: CPT | Performed by: FAMILY MEDICINE

## 2022-01-01 PROCEDURE — 99232 SBSQ HOSP IP/OBS MODERATE 35: CPT | Performed by: FAMILY MEDICINE

## 2022-01-01 PROCEDURE — C9803 HOPD COVID-19 SPEC COLLECT: HCPCS | Performed by: FAMILY MEDICINE

## 2022-01-01 PROCEDURE — 250N000013 HC RX MED GY IP 250 OP 250 PS 637: Performed by: INTERNAL MEDICINE

## 2022-01-01 PROCEDURE — 250N000013 HC RX MED GY IP 250 OP 250 PS 637: Performed by: FAMILY MEDICINE

## 2022-01-01 PROCEDURE — 85027 COMPLETE CBC AUTOMATED: CPT | Performed by: INTERNAL MEDICINE

## 2022-01-01 PROCEDURE — 83690 ASSAY OF LIPASE: CPT | Performed by: FAMILY MEDICINE

## 2022-01-01 PROCEDURE — 85014 HEMATOCRIT: CPT | Performed by: FAMILY MEDICINE

## 2022-01-01 PROCEDURE — 99285 EMERGENCY DEPT VISIT HI MDM: CPT | Mod: 25,CS | Performed by: FAMILY MEDICINE

## 2022-01-01 PROCEDURE — 84484 ASSAY OF TROPONIN QUANT: CPT | Performed by: INTERNAL MEDICINE

## 2022-01-01 PROCEDURE — 97530 THERAPEUTIC ACTIVITIES: CPT | Mod: GO | Performed by: OCCUPATIONAL THERAPIST

## 2022-01-01 PROCEDURE — 71045 X-RAY EXAM CHEST 1 VIEW: CPT

## 2022-01-01 PROCEDURE — 97535 SELF CARE MNGMENT TRAINING: CPT | Mod: GO | Performed by: OCCUPATIONAL THERAPIST

## 2022-01-01 PROCEDURE — 82310 ASSAY OF CALCIUM: CPT | Performed by: INTERNAL MEDICINE

## 2022-01-01 PROCEDURE — 36415 COLL VENOUS BLD VENIPUNCTURE: CPT | Performed by: INTERNAL MEDICINE

## 2022-01-01 PROCEDURE — 99232 SBSQ HOSP IP/OBS MODERATE 35: CPT | Performed by: INTERNAL MEDICINE

## 2022-01-01 PROCEDURE — 97530 THERAPEUTIC ACTIVITIES: CPT | Mod: GP

## 2022-01-01 PROCEDURE — 97116 GAIT TRAINING THERAPY: CPT | Mod: GP

## 2022-01-01 PROCEDURE — 99285 EMERGENCY DEPT VISIT HI MDM: CPT | Performed by: FAMILY MEDICINE

## 2022-01-01 PROCEDURE — 97530 THERAPEUTIC ACTIVITIES: CPT | Mod: GO

## 2022-01-01 PROCEDURE — 99283 EMERGENCY DEPT VISIT LOW MDM: CPT | Mod: CS | Performed by: FAMILY MEDICINE

## 2022-01-01 PROCEDURE — 93010 ELECTROCARDIOGRAM REPORT: CPT | Performed by: INTERNAL MEDICINE

## 2022-01-01 PROCEDURE — 87186 SC STD MICRODIL/AGAR DIL: CPT | Performed by: FAMILY MEDICINE

## 2022-01-01 PROCEDURE — 81001 URINALYSIS AUTO W/SCOPE: CPT | Performed by: FAMILY MEDICINE

## 2022-01-01 PROCEDURE — 250N000011 HC RX IP 250 OP 636: Performed by: FAMILY MEDICINE

## 2022-01-01 PROCEDURE — 84484 ASSAY OF TROPONIN QUANT: CPT | Performed by: FAMILY MEDICINE

## 2022-01-01 PROCEDURE — 84484 ASSAY OF TROPONIN QUANT: CPT | Mod: 91 | Performed by: FAMILY MEDICINE

## 2022-01-01 PROCEDURE — 36415 COLL VENOUS BLD VENIPUNCTURE: CPT | Performed by: FAMILY MEDICINE

## 2022-01-01 PROCEDURE — 97161 PT EVAL LOW COMPLEX 20 MIN: CPT | Mod: GP

## 2022-01-01 PROCEDURE — 85025 COMPLETE CBC W/AUTO DIFF WBC: CPT | Performed by: FAMILY MEDICINE

## 2022-01-01 PROCEDURE — 99285 EMERGENCY DEPT VISIT HI MDM: CPT | Mod: 25 | Performed by: FAMILY MEDICINE

## 2022-01-01 PROCEDURE — 83880 ASSAY OF NATRIURETIC PEPTIDE: CPT | Mod: GZ | Performed by: FAMILY MEDICINE

## 2022-01-01 PROCEDURE — 999N000208 ECHOCARDIOGRAM COMPLETE

## 2022-01-01 PROCEDURE — 80048 BASIC METABOLIC PNL TOTAL CA: CPT | Performed by: FAMILY MEDICINE

## 2022-01-01 PROCEDURE — 99239 HOSP IP/OBS DSCHRG MGMT >30: CPT | Performed by: FAMILY MEDICINE

## 2022-01-01 PROCEDURE — 80053 COMPREHEN METABOLIC PANEL: CPT | Performed by: FAMILY MEDICINE

## 2022-01-01 PROCEDURE — 97165 OT EVAL LOW COMPLEX 30 MIN: CPT | Mod: GO | Performed by: OCCUPATIONAL THERAPIST

## 2022-01-01 PROCEDURE — 74018 RADEX ABDOMEN 1 VIEW: CPT

## 2022-01-01 PROCEDURE — 93005 ELECTROCARDIOGRAM TRACING: CPT | Performed by: FAMILY MEDICINE

## 2022-01-01 PROCEDURE — 255N000002 HC RX 255 OP 636: Performed by: FAMILY MEDICINE

## 2022-01-01 RX ORDER — MULTIVIT WITH MINERALS/LUTEIN
1000 TABLET ORAL DAILY
COMMUNITY

## 2022-01-01 RX ORDER — LISINOPRIL 2.5 MG/1
2.5 TABLET ORAL DAILY
Qty: 30 TABLET | Refills: 0 | Status: SHIPPED | OUTPATIENT
Start: 2022-01-01

## 2022-01-01 RX ORDER — NYSTATIN 100000 [USP'U]/G
POWDER TOPICAL 3 TIMES DAILY PRN
Status: DISCONTINUED | OUTPATIENT
Start: 2022-01-01 | End: 2022-01-01 | Stop reason: HOSPADM

## 2022-01-01 RX ORDER — NITROGLYCERIN 0.4 MG/1
0.4 TABLET SUBLINGUAL EVERY 5 MIN PRN
Status: DISCONTINUED | OUTPATIENT
Start: 2022-01-01 | End: 2022-01-01 | Stop reason: HOSPADM

## 2022-01-01 RX ORDER — ACETAMINOPHEN 500 MG
1000 TABLET ORAL ONCE
Status: COMPLETED | OUTPATIENT
Start: 2022-01-01 | End: 2022-01-01

## 2022-01-01 RX ORDER — CEFTRIAXONE SODIUM 2 G/50ML
2 INJECTION, SOLUTION INTRAVENOUS EVERY 24 HOURS
Status: DISCONTINUED | OUTPATIENT
Start: 2022-01-01 | End: 2022-01-01

## 2022-01-01 RX ORDER — ONDANSETRON 2 MG/ML
4 INJECTION INTRAMUSCULAR; INTRAVENOUS EVERY 6 HOURS PRN
Status: DISCONTINUED | OUTPATIENT
Start: 2022-01-01 | End: 2022-01-01 | Stop reason: HOSPADM

## 2022-01-01 RX ORDER — CEFTRIAXONE SODIUM 1 G/50ML
1 INJECTION, SOLUTION INTRAVENOUS EVERY 24 HOURS
Status: DISCONTINUED | OUTPATIENT
Start: 2022-01-01 | End: 2022-01-01

## 2022-01-01 RX ORDER — METOPROLOL SUCCINATE 50 MG/1
50 TABLET, EXTENDED RELEASE ORAL DAILY
Status: DISCONTINUED | OUTPATIENT
Start: 2022-01-01 | End: 2022-01-01 | Stop reason: HOSPADM

## 2022-01-01 RX ORDER — ATORVASTATIN CALCIUM 40 MG/1
80 TABLET, FILM COATED ORAL
Status: DISCONTINUED | OUTPATIENT
Start: 2022-01-01 | End: 2022-01-01 | Stop reason: HOSPADM

## 2022-01-01 RX ORDER — ALBUTEROL SULFATE 0.83 MG/ML
2.5 SOLUTION RESPIRATORY (INHALATION) EVERY 6 HOURS PRN
Status: DISCONTINUED | OUTPATIENT
Start: 2022-01-01 | End: 2022-01-01 | Stop reason: HOSPADM

## 2022-01-01 RX ORDER — DOCUSATE SODIUM 100 MG/1
100 CAPSULE, LIQUID FILLED ORAL 2 TIMES DAILY
Qty: 60 CAPSULE | Refills: 0 | Status: ON HOLD | OUTPATIENT
Start: 2022-01-01 | End: 2023-01-01

## 2022-01-01 RX ORDER — ASPIRIN 81 MG/1
81 TABLET, CHEWABLE ORAL DAILY
Status: DISCONTINUED | OUTPATIENT
Start: 2022-01-01 | End: 2022-01-01 | Stop reason: HOSPADM

## 2022-01-01 RX ORDER — FUROSEMIDE 10 MG/ML
20 INJECTION INTRAMUSCULAR; INTRAVENOUS ONCE
Status: COMPLETED | OUTPATIENT
Start: 2022-01-01 | End: 2022-01-01

## 2022-01-01 RX ORDER — TRAZODONE HYDROCHLORIDE 50 MG/1
50 TABLET, FILM COATED ORAL
Status: DISCONTINUED | OUTPATIENT
Start: 2022-01-01 | End: 2022-01-01

## 2022-01-01 RX ORDER — TRAZODONE HYDROCHLORIDE 50 MG/1
50 TABLET, FILM COATED ORAL
Status: DISCONTINUED | OUTPATIENT
Start: 2022-01-01 | End: 2022-01-01 | Stop reason: HOSPADM

## 2022-01-01 RX ORDER — LISINOPRIL 2.5 MG/1
2.5 TABLET ORAL DAILY
Status: DISCONTINUED | OUTPATIENT
Start: 2022-01-01 | End: 2022-01-01 | Stop reason: HOSPADM

## 2022-01-01 RX ORDER — ATORVASTATIN CALCIUM 80 MG/1
80 TABLET, FILM COATED ORAL DAILY
Qty: 30 TABLET | Refills: 0 | Status: SHIPPED | OUTPATIENT
Start: 2022-01-01

## 2022-01-01 RX ORDER — AMOXICILLIN 250 MG
1 CAPSULE ORAL ONCE
Status: COMPLETED | OUTPATIENT
Start: 2022-01-01 | End: 2022-01-01

## 2022-01-01 RX ORDER — ENOXAPARIN SODIUM 100 MG/ML
1 INJECTION SUBCUTANEOUS EVERY 12 HOURS
Status: DISCONTINUED | OUTPATIENT
Start: 2022-01-01 | End: 2022-01-01 | Stop reason: DRUGHIGH

## 2022-01-01 RX ORDER — ASPIRIN 81 MG/1
324 TABLET, CHEWABLE ORAL ONCE
Status: COMPLETED | OUTPATIENT
Start: 2022-01-01 | End: 2022-01-01

## 2022-01-01 RX ORDER — ATORVASTATIN CALCIUM 10 MG/1
10 TABLET, FILM COATED ORAL
Status: DISCONTINUED | OUTPATIENT
Start: 2022-01-01 | End: 2022-01-01 | Stop reason: DRUGHIGH

## 2022-01-01 RX ORDER — METOPROLOL SUCCINATE 50 MG/1
50 TABLET, EXTENDED RELEASE ORAL DAILY
COMMUNITY
Start: 2022-01-01

## 2022-01-01 RX ORDER — FUROSEMIDE 20 MG
20 TABLET ORAL DAILY
Qty: 30 TABLET | Refills: 0 | Status: SHIPPED | OUTPATIENT
Start: 2022-01-01

## 2022-01-01 RX ORDER — FUROSEMIDE 20 MG
20 TABLET ORAL DAILY
Status: DISCONTINUED | OUTPATIENT
Start: 2022-01-01 | End: 2022-01-01 | Stop reason: HOSPADM

## 2022-01-01 RX ORDER — BISACODYL 10 MG
10 SUPPOSITORY, RECTAL RECTAL
Status: COMPLETED | OUTPATIENT
Start: 2022-01-01 | End: 2022-01-01

## 2022-01-01 RX ORDER — ASPIRIN 81 MG/1
81 TABLET ORAL DAILY
Status: DISCONTINUED | OUTPATIENT
Start: 2022-01-01 | End: 2022-01-01 | Stop reason: ALTCHOICE

## 2022-01-01 RX ORDER — POLYETHYLENE GLYCOL 3350 17 G/17G
17 POWDER, FOR SOLUTION ORAL DAILY
Status: DISCONTINUED | OUTPATIENT
Start: 2022-01-01 | End: 2022-01-01 | Stop reason: HOSPADM

## 2022-01-01 RX ORDER — DOCUSATE SODIUM 100 MG/1
100 CAPSULE, LIQUID FILLED ORAL 2 TIMES DAILY
Status: DISCONTINUED | OUTPATIENT
Start: 2022-01-01 | End: 2022-01-01 | Stop reason: HOSPADM

## 2022-01-01 RX ORDER — TRAZODONE HYDROCHLORIDE 50 MG/1
50 TABLET, FILM COATED ORAL AT BEDTIME
Status: DISCONTINUED | OUTPATIENT
Start: 2022-01-01 | End: 2022-01-01

## 2022-01-01 RX ORDER — ACETAMINOPHEN 325 MG/1
650 TABLET ORAL EVERY 4 HOURS PRN
Status: DISCONTINUED | OUTPATIENT
Start: 2022-01-01 | End: 2022-01-01 | Stop reason: HOSPADM

## 2022-01-01 RX ORDER — BISACODYL 10 MG
10 SUPPOSITORY, RECTAL RECTAL ONCE
Status: COMPLETED | OUTPATIENT
Start: 2022-01-01 | End: 2022-01-01

## 2022-01-01 RX ORDER — ENOXAPARIN SODIUM 100 MG/ML
0.75 INJECTION SUBCUTANEOUS EVERY 12 HOURS
Status: DISCONTINUED | OUTPATIENT
Start: 2022-01-01 | End: 2022-01-01

## 2022-01-01 RX ADMIN — FUROSEMIDE 20 MG: 20 TABLET ORAL at 09:38

## 2022-01-01 RX ADMIN — DOCUSATE SODIUM 100 MG: 100 CAPSULE, LIQUID FILLED ORAL at 09:03

## 2022-01-01 RX ADMIN — LISINOPRIL 2.5 MG: 2.5 TABLET ORAL at 09:14

## 2022-01-01 RX ADMIN — ENOXAPARIN SODIUM 70 MG: 80 INJECTION SUBCUTANEOUS at 14:36

## 2022-01-01 RX ADMIN — ASPIRIN 81 MG 81 MG: 81 TABLET ORAL at 10:08

## 2022-01-01 RX ADMIN — TICAGRELOR 180 MG: 90 TABLET ORAL at 14:36

## 2022-01-01 RX ADMIN — CEFTRIAXONE SODIUM 1 G: 1 INJECTION, SOLUTION INTRAVENOUS at 09:13

## 2022-01-01 RX ADMIN — CEFTRIAXONE SODIUM 1 G: 1 INJECTION, SOLUTION INTRAVENOUS at 10:10

## 2022-01-01 RX ADMIN — ATORVASTATIN CALCIUM 80 MG: 40 TABLET, FILM COATED ORAL at 19:48

## 2022-01-01 RX ADMIN — TICAGRELOR 90 MG: 90 TABLET ORAL at 21:19

## 2022-01-01 RX ADMIN — LISINOPRIL 2.5 MG: 2.5 TABLET ORAL at 09:38

## 2022-01-01 RX ADMIN — METOPROLOL SUCCINATE 50 MG: 50 TABLET, EXTENDED RELEASE ORAL at 09:14

## 2022-01-01 RX ADMIN — TRAZODONE HYDROCHLORIDE 50 MG: 50 TABLET ORAL at 21:12

## 2022-01-01 RX ADMIN — ASPIRIN 81 MG 81 MG: 81 TABLET ORAL at 09:44

## 2022-01-01 RX ADMIN — ACETAMINOPHEN 650 MG: 325 TABLET, FILM COATED ORAL at 16:44

## 2022-01-01 RX ADMIN — ONDANSETRON 4 MG: 2 INJECTION INTRAMUSCULAR; INTRAVENOUS at 02:07

## 2022-01-01 RX ADMIN — ENOXAPARIN SODIUM 70 MG: 80 INJECTION SUBCUTANEOUS at 02:51

## 2022-01-01 RX ADMIN — ATORVASTATIN CALCIUM 80 MG: 40 TABLET, FILM COATED ORAL at 19:21

## 2022-01-01 RX ADMIN — BISACODYL 10 MG: 10 SUPPOSITORY RECTAL at 17:06

## 2022-01-01 RX ADMIN — CEFTRIAXONE SODIUM 1 G: 1 INJECTION, SOLUTION INTRAVENOUS at 14:36

## 2022-01-01 RX ADMIN — METOPROLOL SUCCINATE 50 MG: 50 TABLET, EXTENDED RELEASE ORAL at 10:07

## 2022-01-01 RX ADMIN — LISINOPRIL 2.5 MG: 2.5 TABLET ORAL at 10:13

## 2022-01-01 RX ADMIN — DOCUSATE SODIUM 100 MG: 100 CAPSULE, LIQUID FILLED ORAL at 21:10

## 2022-01-01 RX ADMIN — ATORVASTATIN CALCIUM 80 MG: 40 TABLET, FILM COATED ORAL at 20:14

## 2022-01-01 RX ADMIN — ASPIRIN 81 MG 81 MG: 81 TABLET ORAL at 09:14

## 2022-01-01 RX ADMIN — ASPIRIN 81 MG 324 MG: 81 TABLET ORAL at 14:35

## 2022-01-01 RX ADMIN — CEPHALEXIN 250 MG: 250 CAPSULE ORAL at 11:50

## 2022-01-01 RX ADMIN — CEFTRIAXONE SODIUM 1 G: 1 INJECTION, SOLUTION INTRAVENOUS at 09:46

## 2022-01-01 RX ADMIN — LISINOPRIL 2.5 MG: 2.5 TABLET ORAL at 09:03

## 2022-01-01 RX ADMIN — POLYETHYLENE GLYCOL 3350 17 G: 17 POWDER, FOR SOLUTION ORAL at 09:03

## 2022-01-01 RX ADMIN — CEFTRIAXONE SODIUM 2 G: 2 INJECTION, SOLUTION INTRAVENOUS at 10:13

## 2022-01-01 RX ADMIN — ATORVASTATIN CALCIUM 80 MG: 40 TABLET, FILM COATED ORAL at 21:19

## 2022-01-01 RX ADMIN — FUROSEMIDE 20 MG: 20 TABLET ORAL at 09:03

## 2022-01-01 RX ADMIN — METOPROLOL SUCCINATE 50 MG: 50 TABLET, EXTENDED RELEASE ORAL at 09:45

## 2022-01-01 RX ADMIN — FUROSEMIDE 20 MG: 10 INJECTION, SOLUTION INTRAVENOUS at 08:39

## 2022-01-01 RX ADMIN — TICAGRELOR 90 MG: 90 TABLET ORAL at 21:12

## 2022-01-01 RX ADMIN — TICAGRELOR 90 MG: 90 TABLET ORAL at 09:44

## 2022-01-01 RX ADMIN — TICAGRELOR 90 MG: 90 TABLET ORAL at 10:07

## 2022-01-01 RX ADMIN — MAGNESIUM HYDROXIDE 30 ML: 400 SUSPENSION ORAL at 07:52

## 2022-01-01 RX ADMIN — ONDANSETRON 4 MG: 2 INJECTION INTRAMUSCULAR; INTRAVENOUS at 10:02

## 2022-01-01 RX ADMIN — DOCUSATE SODIUM 100 MG: 100 CAPSULE, LIQUID FILLED ORAL at 13:45

## 2022-01-01 RX ADMIN — ACETAMINOPHEN 1000 MG: 500 TABLET ORAL at 15:38

## 2022-01-01 RX ADMIN — TICAGRELOR 90 MG: 90 TABLET ORAL at 09:03

## 2022-01-01 RX ADMIN — SENNOSIDES AND DOCUSATE SODIUM 1 TABLET: 50; 8.6 TABLET ORAL at 09:51

## 2022-01-01 RX ADMIN — ASPIRIN 81 MG 81 MG: 81 TABLET ORAL at 09:03

## 2022-01-01 RX ADMIN — ASPIRIN 81 MG 81 MG: 81 TABLET ORAL at 10:13

## 2022-01-01 RX ADMIN — BISACODYL 10 MG: 10 SUPPOSITORY RECTAL at 11:35

## 2022-01-01 RX ADMIN — ENOXAPARIN SODIUM 70 MG: 80 INJECTION SUBCUTANEOUS at 02:11

## 2022-01-01 RX ADMIN — TICAGRELOR 90 MG: 90 TABLET ORAL at 21:10

## 2022-01-01 RX ADMIN — TICAGRELOR 90 MG: 90 TABLET ORAL at 22:33

## 2022-01-01 RX ADMIN — FUROSEMIDE 20 MG: 20 TABLET ORAL at 12:28

## 2022-01-01 RX ADMIN — ASPIRIN 81 MG 81 MG: 81 TABLET ORAL at 09:38

## 2022-01-01 RX ADMIN — TICAGRELOR 90 MG: 90 TABLET ORAL at 09:38

## 2022-01-01 RX ADMIN — CEFTRIAXONE SODIUM 1 G: 1 INJECTION, SOLUTION INTRAVENOUS at 09:42

## 2022-01-01 RX ADMIN — FUROSEMIDE 20 MG: 20 TABLET ORAL at 10:13

## 2022-01-01 RX ADMIN — ACETAMINOPHEN 650 MG: 325 TABLET, FILM COATED ORAL at 15:03

## 2022-01-01 RX ADMIN — ONDANSETRON 4 MG: 2 INJECTION INTRAMUSCULAR; INTRAVENOUS at 20:24

## 2022-01-01 RX ADMIN — METOPROLOL SUCCINATE 50 MG: 50 TABLET, EXTENDED RELEASE ORAL at 09:38

## 2022-01-01 RX ADMIN — TRAZODONE HYDROCHLORIDE 50 MG: 50 TABLET ORAL at 21:10

## 2022-01-01 RX ADMIN — LISINOPRIL 2.5 MG: 2.5 TABLET ORAL at 14:35

## 2022-01-01 RX ADMIN — ATORVASTATIN CALCIUM 80 MG: 40 TABLET, FILM COATED ORAL at 19:59

## 2022-01-01 RX ADMIN — ACETAMINOPHEN 650 MG: 325 TABLET, FILM COATED ORAL at 21:10

## 2022-01-01 RX ADMIN — METOPROLOL SUCCINATE 50 MG: 50 TABLET, EXTENDED RELEASE ORAL at 10:13

## 2022-01-01 RX ADMIN — ACETAMINOPHEN 650 MG: 325 TABLET, FILM COATED ORAL at 02:57

## 2022-01-01 RX ADMIN — FUROSEMIDE 20 MG: 20 TABLET ORAL at 09:14

## 2022-01-01 RX ADMIN — TICAGRELOR 90 MG: 90 TABLET ORAL at 10:13

## 2022-01-01 RX ADMIN — ENOXAPARIN SODIUM 70 MG: 80 INJECTION SUBCUTANEOUS at 14:32

## 2022-01-01 RX ADMIN — ATORVASTATIN CALCIUM 80 MG: 40 TABLET, FILM COATED ORAL at 21:12

## 2022-01-01 RX ADMIN — ONDANSETRON 4 MG: 2 INJECTION INTRAMUSCULAR; INTRAVENOUS at 16:27

## 2022-01-01 RX ADMIN — TRAZODONE HYDROCHLORIDE 50 MG: 50 TABLET ORAL at 22:53

## 2022-01-01 RX ADMIN — LISINOPRIL 2.5 MG: 2.5 TABLET ORAL at 09:44

## 2022-01-01 RX ADMIN — METOPROLOL SUCCINATE 50 MG: 50 TABLET, EXTENDED RELEASE ORAL at 09:03

## 2022-01-01 RX ADMIN — TICAGRELOR 90 MG: 90 TABLET ORAL at 09:16

## 2022-01-01 RX ADMIN — TICAGRELOR 90 MG: 90 TABLET ORAL at 23:34

## 2022-01-01 RX ADMIN — TRAZODONE HYDROCHLORIDE 50 MG: 50 TABLET ORAL at 23:06

## 2022-01-01 RX ADMIN — PERFLUTREN 4 ML: 6.52 INJECTION, SUSPENSION INTRAVENOUS at 10:14

## 2022-01-01 ASSESSMENT — ACTIVITIES OF DAILY LIVING (ADL)
ADLS_ACUITY_SCORE: 37
ADLS_ACUITY_SCORE: 34
ADLS_ACUITY_SCORE: 30
ADLS_ACUITY_SCORE: 34
ADLS_ACUITY_SCORE: 35
ADLS_ACUITY_SCORE: 34
FALL_HISTORY_WITHIN_LAST_SIX_MONTHS: YES
ADLS_ACUITY_SCORE: 34
ADLS_ACUITY_SCORE: 37
ADLS_ACUITY_SCORE: 26
ADLS_ACUITY_SCORE: 36
WEAR_GLASSES_OR_BLIND: YES
ADLS_ACUITY_SCORE: 34
ADLS_ACUITY_SCORE: 30
ADLS_ACUITY_SCORE: 37
ADLS_ACUITY_SCORE: 37
WALKING_OR_CLIMBING_STAIRS_DIFFICULTY: NO
HEARING_DIFFICULTY_OR_DEAF: NO
ADLS_ACUITY_SCORE: 32
ADLS_ACUITY_SCORE: 37
ADLS_ACUITY_SCORE: 33
ADLS_ACUITY_SCORE: 33
ADLS_ACUITY_SCORE: 32
ADLS_ACUITY_SCORE: 37
ADLS_ACUITY_SCORE: 34
ADLS_ACUITY_SCORE: 37
ADLS_ACUITY_SCORE: 32
EQUIPMENT_CURRENTLY_USED_AT_HOME: WALKER, ROLLING
ADLS_ACUITY_SCORE: 33
DIFFICULTY_EATING/SWALLOWING: NO
CONCENTRATING,_REMEMBERING_OR_MAKING_DECISIONS_DIFFICULTY: NO
ADLS_ACUITY_SCORE: 34
ADLS_ACUITY_SCORE: 34
DRESSING/BATHING_DIFFICULTY: NO
ADLS_ACUITY_SCORE: 32
VISION_MANAGEMENT: GLASSES
ADLS_ACUITY_SCORE: 30
ADLS_ACUITY_SCORE: 37
ADLS_ACUITY_SCORE: 36
HEARING_DIFFICULTY_OR_DEAF: NO
ADLS_ACUITY_SCORE: 35
ADLS_ACUITY_SCORE: 32
VISION_MANAGEMENT: GLASSES
ADLS_ACUITY_SCORE: 37
ADLS_ACUITY_SCORE: 32
ADLS_ACUITY_SCORE: 32
DOING_ERRANDS_INDEPENDENTLY_DIFFICULTY: NO
ADLS_ACUITY_SCORE: 37
CHANGE_IN_FUNCTIONAL_STATUS_SINCE_ONSET_OF_CURRENT_ILLNESS/INJURY: YES
ADLS_ACUITY_SCORE: 37
NUMBER_OF_TIMES_PATIENT_HAS_FALLEN_WITHIN_LAST_SIX_MONTHS: 1
ADLS_ACUITY_SCORE: 32
ADLS_ACUITY_SCORE: 32
ADLS_ACUITY_SCORE: 34
ADLS_ACUITY_SCORE: 32
ADLS_ACUITY_SCORE: 37
ADLS_ACUITY_SCORE: 32
ADLS_ACUITY_SCORE: 34
ADLS_ACUITY_SCORE: 32
ADLS_ACUITY_SCORE: 32
DIFFICULTY_COMMUNICATING: NO
ADLS_ACUITY_SCORE: 33
ADLS_ACUITY_SCORE: 32
WEAR_GLASSES_OR_BLIND: YES
FALL_HISTORY_WITHIN_LAST_SIX_MONTHS: YES
ADLS_ACUITY_SCORE: 37
DIFFICULTY_COMMUNICATING: NO
CHANGE_IN_FUNCTIONAL_STATUS_SINCE_ONSET_OF_CURRENT_ILLNESS/INJURY: YES
DIFFICULTY_EATING/SWALLOWING: NO
ADLS_ACUITY_SCORE: 30
ADLS_ACUITY_SCORE: 32
DOING_ERRANDS_INDEPENDENTLY_DIFFICULTY: NO
ADLS_ACUITY_SCORE: 37
ADLS_ACUITY_SCORE: 26
ADLS_ACUITY_SCORE: 26
WALKING_OR_CLIMBING_STAIRS_DIFFICULTY: NO
ADLS_ACUITY_SCORE: 34
ADLS_ACUITY_SCORE: 37
ADLS_ACUITY_SCORE: 32
NUMBER_OF_TIMES_PATIENT_HAS_FALLEN_WITHIN_LAST_SIX_MONTHS: 1
DRESSING/BATHING_DIFFICULTY: NO
ADLS_ACUITY_SCORE: 30
ADLS_ACUITY_SCORE: 34
ADLS_ACUITY_SCORE: 34
ADLS_ACUITY_SCORE: 33
ADLS_ACUITY_SCORE: 37
ADLS_ACUITY_SCORE: 35
TOILETING_ISSUES: NO
ADLS_ACUITY_SCORE: 41
CONCENTRATING,_REMEMBERING_OR_MAKING_DECISIONS_DIFFICULTY: NO
ADLS_ACUITY_SCORE: 35
ADLS_ACUITY_SCORE: 30
ADLS_ACUITY_SCORE: 30
ADLS_ACUITY_SCORE: 37
ADLS_ACUITY_SCORE: 32
TOILETING_ISSUES: NO

## 2022-01-01 ASSESSMENT — ENCOUNTER SYMPTOMS
NAUSEA: 1
CONSTIPATION: 1
FEVER: 1

## 2022-09-21 PROBLEM — I21.4 NSTEMI (NON-ST ELEVATED MYOCARDIAL INFARCTION) (H): Status: ACTIVE | Noted: 2022-01-01

## 2022-09-21 PROBLEM — N39.0 URINARY TRACT INFECTION: Status: ACTIVE | Noted: 2019-10-28

## 2022-09-21 NOTE — ED PROVIDER NOTES
History     Chief Complaint   Patient presents with     Fall     Here with her daughter    The history is provided by the patient and a relative.     Niru Durham is a 89 year old female here via EMS from home. She fell today and could not get up. This was the second lift assist call in two days so they brought her in at the family's request. Niru denies any pain from the fall but agrees that she is weak and could not get up on her own.     She is not on blood thinners. She lives in a house alone and does all her own ADL's, cleaning (except vacuuming), and cooking. Her family lives next door and see her daily. She is able to walk on her own outside with her walker typically, so all in all she has been very independent. She was in [facility] a few years ago for rehab and stayed about two weeks. PT came to the house for a short time after that and she did well. She has SOB (on albuterol), on aspirin and pravastatin, on metoprolol for BP, and oxybutynin.    Allergies:  Allergies   Allergen Reactions     Codeine Nausea     Naproxen Nausea and Vomiting       Problem List:    Patient Active Problem List    Diagnosis Date Noted     NSTEMI (non-ST elevated myocardial infarction) (H) 09/21/2022     Priority: Medium     Nonmelanoma skin cancer 10/28/2019     Priority: Medium     Onychocryptosis 10/28/2019     Priority: Medium     Urinary tract infection 10/28/2019     Priority: Medium     Sepsis (H) 10/28/2019     Priority: Medium     Cognitive impairment 01/06/2019     Priority: Medium     Goiter 01/05/2019     Priority: Medium     Kyphosis (acquired) (postural) 01/05/2019     Priority: Medium     Overview:   Kyphosis of the spine in spite of estrogen therapy, compression fracture 09/05  Kyphosis of the spine in spite of estrogen therapy, compression fracture 09/05       Lactose intolerance 01/05/2019     Priority: Medium     Overview:   chronic  chronic       Other urinary incontinence 01/05/2019      Priority: Medium     Bladder instability 10/04/2017     Priority: Medium     Chronic right hip pain 10/04/2017     Priority: Medium     Colonoscopy refused 10/04/2016     Priority: Medium     Gout 10/22/2015     Priority: Medium     Hypertension 10/22/2015     Priority: Medium     Coronary atherosclerosis of native coronary artery 08/20/2014     Priority: Medium     Overview:   4/2014, NSTEMI, JANE x 2 to LAD and RCA  NSTEMI 2014 with JANE to LAD and RCA       Unstable angina (H) 04/02/2014     Priority: Medium     Full dentures 10/17/2013     Priority: Medium     Osteopenia 10/17/2013     Priority: Medium     Overview:   Dexa 2007  Dexa 2007       Onychomycosis 11/15/2011     Priority: Medium     Hyperlipidemia 10/11/2010     Priority: Medium        Past Medical History:    Past Medical History:   Diagnosis Date     Acquired postural kyphosis      Acute myocardial infarction, subendocardial infarction (H)      Female stress incontinence      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 Dr Matthews. Repeat 10 years     ENDOSCOPIC STRIPPING VEIN(S)      1971,Bilateral vein stripping     EXTRACAPSULAR CATARACT EXTRATION WITH INTRAOCULAR LENS IMPLANT      Bilateral cataract surgery with lens implant     HYSTERECTOMY TOTAL ABDOMINAL, BILATERAL SALPINGO-OOPHORECTOMY, COMBINED      1994     OTHER SURGICAL HISTORY      1982, 1994,205138,DILATION AND CURETTAGE       Family History:    Family History   Problem Relation Age of Onset     Diabetes Mother         Diabetes,diabetic complications     Breast Cancer No family hx of         Cancer-breast       Social History:  Marital Status:    "[5]  Social History     Tobacco Use     Smoking status: Former Smoker     Packs/day: 0.10     Years: 2.00     Pack years: 0.20     Types: Cigarettes     Quit date: 1983     Years since quittin.5     Smokeless tobacco: Never Used   Substance Use Topics     Alcohol use: No     Drug use: Unknown     Types: Other     Comment: Drug use: No        Medications:    acetaminophen (TYLENOL) 500 MG tablet  albuterol (PROVENTIL) (2.5 MG/3ML) 0.083% neb solution  aspirin 81 MG EC tablet  Cranberry 1000 MG CAPS  metoprolol succinate ER (TOPROL XL) 50 MG 24 hr tablet  oxybutynin ER (DITROPAN-XL) 10 MG 24 hr tablet  pravastatin (PRAVACHOL) 40 MG tablet  ketoconazole (NIZORAL) 2 % external cream  nitroGLYcerin (NITROSTAT) 0.4 MG sublingual tablet          Review of Systems   All other systems reviewed and are negative.      Physical Exam   BP: (!) 146/81  Pulse: 82  Temp: 98.4  F (36.9  C)  Resp: 20  Height: 157.5 cm (5' 2\")  Weight: 89 kg (196 lb 3.2 oz)  SpO2: 93 %      Physical Exam  Vitals and nursing note reviewed.   Constitutional:       General: She is not in acute distress.     Appearance: Normal appearance. She is obese. She is not ill-appearing, toxic-appearing or diaphoretic.   HENT:      Head: Normocephalic and atraumatic.      Right Ear: External ear normal.      Left Ear: External ear normal.      Nose: Nose normal.   Cardiovascular:      Rate and Rhythm: Normal rate and regular rhythm.      Pulses: Normal pulses.      Heart sounds: Normal heart sounds.   Pulmonary:      Breath sounds: No stridor. Rhonchi and rales present. No wheezing.      Comments: She has shortness of breath with mild increased work of breathing and cannot speak in full sentences.  Abdominal:      General: Bowel sounds are normal.      Tenderness: There is no abdominal tenderness. There is no guarding.   Musculoskeletal:      Right lower leg: Edema present.      Left lower leg: Edema present.      Comments: 1+ LE edema bilaterally "   Skin:     General: Skin is warm and dry.   Neurological:      General: No focal deficit present.      Mental Status: She is alert and oriented to person, place, and time.   Psychiatric:         Mood and Affect: Mood normal.         Behavior: Behavior normal.         Thought Content: Thought content normal.         Judgment: Judgment normal.       EKG: NSR with rate 78, left axis, new T wave inversion compared to 2019 and QTc 501. This EKG has changed in the past three years.     Results for orders placed or performed during the hospital encounter of 09/21/22 (from the past 24 hour(s))   CBC with platelets differential    Narrative    The following orders were created for panel order CBC with platelets differential.  Procedure                               Abnormality         Status                     ---------                               -----------         ------                     CBC with platelets and d...[[id number]]                      Final result                 Please view results for these tests on the individual orders.   Basic metabolic panel   Result Value Ref Range    Sodium 139 134 - 144 mmol/L    Potassium 3.9 3.5 - 5.1 mmol/L    Chloride 103 98 - 107 mmol/L    Carbon Dioxide (CO2) 27 21 - 31 mmol/L    Anion Gap 9 3 - 14 mmol/L    Urea Nitrogen 19 7 - 25 mg/dL    Creatinine 0.69 0.60 - 1.20 mg/dL    Calcium 10.1 8.6 - 10.3 mg/dL    Glucose 126 (H) 70 - 105 mg/dL    GFR Estimate 82 >60 mL/min/1.73m2   Nt probnp inpatient (BNP)   Result Value Ref Range    N terminal Pro BNP Inpatient 446 (H) 0 - 100 pg/mL   Troponin I   Result Value Ref Range    Troponin I 6,915.4 (HH) 0.0 - 34.0 pg/mL   CBC with platelets and differential   Result Value Ref Range    WBC Count 7.3 4.0 - 11.0 10e3/uL    RBC Count 4.22 3.80 - 5.20 10e6/uL    Hemoglobin 12.2 11.7 - 15.7 g/dL    Hematocrit 37.3 35.0 - 47.0 %    MCV 88 78 - 100 fL    MCH 28.9 26.5 - 33.0 pg    MCHC 32.7 31.5 - 36.5 g/dL    RDW 14.4 10.0 - 15.0 %     Platelet Count 246 150 - 450 10e3/uL    % Neutrophils 77 %    % Lymphocytes 16 %    % Monocytes 6 %    % Eosinophils 1 %    % Basophils 0 %    % Immature Granulocytes 0 %    NRBCs per 100 WBC 0 <1 /100    Absolute Neutrophils 5.5 1.6 - 8.3 10e3/uL    Absolute Lymphocytes 1.2 0.8 - 5.3 10e3/uL    Absolute Monocytes 0.5 0.0 - 1.3 10e3/uL    Absolute Eosinophils 0.1 0.0 - 0.7 10e3/uL    Absolute Basophils 0.0 0.0 - 0.2 10e3/uL    Absolute Immature Granulocytes 0.0 <=0.4 10e3/uL    Absolute NRBCs 0.0 10e3/uL   Extra Tube ([place] Draw)    Narrative    The following orders were created for panel order Extra Tube ([place] Draw).  Procedure                               Abnormality         Status                     ---------                               -----------         ------                     Extra Blue Top Tube[[id number]]                              Final result               Extra Serum Separator Tu...[[id number]]                      Final result                 Please view results for these tests on the individual orders.   Extra Blue Top Tube   Result Value Ref Range    Hold Specimen     Extra Serum Separator Tube (SST)   Result Value Ref Range    Hold Specimen     UA with Microscopic reflex to Culture    Specimen: Urine, Clean Catch   Result Value Ref Range    Color Urine Light Yellow Colorless, Straw, Light Yellow, Yellow    Appearance Urine Slightly Cloudy (A) Clear    Glucose Urine Negative Negative mg/dL    Bilirubin Urine Negative Negative    Ketones Urine Negative Negative mg/dL    Specific Gravity Urine 1.013 1.000 - 1.030    Blood Urine Negative Negative    pH Urine 6.0 5.0 - 9.0    Protein Albumin Urine Negative Negative mg/dL    Urobilinogen Urine Normal Normal, 2.0 mg/dL    Nitrite Urine Positive (A) Negative    Leukocyte Esterase Urine Moderate (A) Negative    Bacteria Urine Few (A) None Seen /HPF    Mucus Urine Present (A) None Seen /LPF    RBC Urine 3 (H) <=2 /HPF    WBC Urine 29 (H) <=5 /HPF     Squamous Epithelials Urine 4 (H) <=1 /HPF    Narrative    Urine Culture ordered based on laboratory criteria   Echocardiogram Complete   Result Value Ref Range    Biplane LVEF 33%     LVEF  30-35% (moderately reduced)     Narrative    [id number]  [id number]  [id number]  [id number]^LIANNE^LYN^ORALIA     [facility]  [address] [place] [address]     Name: MELIZA SMITH  MRN: [id number]  : 1931  Study Date: 2022 09:24 AM  Age: 89 yrs  Gender: Female  Patient Location: [facility]  Reason For Study: Abn EKG  Ordering Physician: LYN ABDALLA  Performed By: Madelyn Vila RDCS, RVT     BSA: 1.9 m2  Height: 62 in  Weight: 196 lb  HR: 94  BP: 146/81 mmHg  ______________________________________________________________________________  Procedure  Complete Portable Echo Adult. Contrast Definity. Definity (NDC #11392-224-68)  given intravenously. Patient was given 4ml mixture of 1.5ml Definity and 8.5ml  saline. 6 ml wasted. Definity Lot # 1325 .  ______________________________________________________________________________  Interpretation Summary  Left ventricular function is decreased. The ejection fraction is 30-35%  (moderately reduced).  Large area of apical akinesis suggestive of LAD obstructive CAD. Alternate  diagnosis is stress CM.  Diastolic Doppler findings (E/E' ratio and/or other parameters) suggest left  ventricular filling pressures are increased.  Global right ventricular function is normal.  No significant valvular abnormalities present.  Mild pulmonary hypertension is present.  IVC diameter <2.1 cm collapsing >50% with sniff suggests a normal RA pressure  of 3 mmHg.  No pericardial effusion is present.  There is no prior study for direct comparison.  ______________________________________________________________________________  Left Ventricle  Left ventricular wall thickness is normal. Biplane LVEF is 33%. Mild left  ventricular dilation is present. Left  ventricular function is decreased. The  ejection fraction is 30-35% (moderately reduced). Grade I or early diastolic  dysfunction. Diastolic Doppler findings (E/E' ratio and/or other parameters)  suggest left ventricular filling pressures are increased. There is no  thrombus.     Right Ventricle  The right ventricle is normal size. Global right ventricular function is  normal.     Atria  Both atria appear normal.     Mitral Valve  The mitral valve is normal.     Aortic Valve  Aortic valve is normal in structure and function. Mild aortic valve  calcification is present.     Tricuspid Valve  The tricuspid valve is normal. The right ventricular systolic pressure is  approximated at 32.1 mmHg plus the right atrial pressure. Mild pulmonary  hypertension is present.     Pulmonic Valve  On Doppler interrogation, there is no significant stenosis or regurgitation.     Vessels  The thoracic aorta is normal. IVC diameter <2.1 cm collapsing >50% with sniff  suggests a normal RA pressure of 3 mmHg.     Pericardium  No pericardial effusion is present.     Miscellaneous  No significant valvular abnormalities present.     Compared to Previous Study  There is no prior study for direct comparison.  ______________________________________________________________________________  MMode/2D Measurements & Calculations     IVSd: 0.76 cm  LVIDd: 5.4 cm  LVIDs: 3.6 cm  LVPWd: 0.84 cm  FS: 33.5 %  LV mass(C)d: 153.6 grams  LV mass(C)dI: 81.1 grams/m2  Ao root diam: 3.2 cm  asc Aorta Diam: 3.3 cm  LVOT diam: 2.1 cm  LVOT area: 3.5 cm2  LA Volume (BP): 34.5 ml  LA Volume Index (BP): 18.2 ml/m2  RWT: 0.31     Doppler Measurements & Calculations  MV E max cristy: 92.7 cm/sec  MV A max cristy: 148.0 cm/sec  MV E/A: 0.63  MV dec slope: 542.0 cm/sec2  MV dec time: 0.17 sec  Ao V2 max: 135.0 cm/sec  Ao max P.0 mmHg  Ao V2 mean: 97.8 cm/sec  Ao mean P.0 mmHg  Ao V2 VTI: 25.5 cm  SARKIS(I,D): 2.4 cm2  SARKIS(V,D): 2.6 cm2  LV V1 max P.0 mmHg  LV V1  max: 100.0 cm/sec  LV V1 VTI: 17.8 cm  SV(LVOT): 61.7 ml  SI(LVOT): 32.5 ml/m2  PA acc time: 0.08 sec  TR max sanjiv: 279.7 cm/sec  TR max P.1 mmHg  AV Sanjiv Ratio (DI): 0.74  SARKIS Index (cm2/m2): 1.3  E/E' av.1  Lateral E/e': 18.5  Medial E/e': 23.6     ______________________________________________________________________________  Report approved by: Ramya Plata 2022 10:58 AM         XR Chest Port 1 View    Narrative    Procedure:XR CHEST PORT 1 VIEW    Clinical history:Female, 89 years, shortness of breath    Technique: Single view was obtained.    Comparison: 10/20/2019    Findings: The cardiac silhouette is normal. The pulmonary vasculature  is normal.    The lungs are clear. Bony structures are unremarkable. No  pneumothorax.      Impression    Impression:   No acute abnormality. No evidence of acute or active cardiopulmonary  disease.    LOIDA HANNA MD         SYSTEM ID:  [id number]       Medications   cephALEXin (KEFLEX) capsule 250 mg (has no administration in time range)   furosemide (LASIX) injection 20 mg (20 mg Intravenous Given 22 9251)   perflutren diluted in saline (DEFINITY) injection 10 mL (4 mLs Intravenous Given 22 1014)       Assessments & Plan (with Medical Decision Making)  Niru Durham is a 89 year old female here via EMS from home. She fell today and could not get up. This was the second lift assist call in two days so they brought her in at the family's request. Niru denies any pain from the fall but agrees that she is weak and could not get up on her own.  She is not on blood thinners. She lives in a house alone and does all her own ADL's, cleaning (except vacuuming), and cooking. Her family lives next door and see her daily. She is able to walk on her own outside with her walker typically, so all in all she has been very independent. She was in [facility] a few years ago for rehab and stayed about two weeks. PT came to the house for a short  "time after that and she did well. She has SOB (on albuterol), on aspirin and pravastatin, on metoprolol for BP, and oxybutynin.  VS in the ED on room air BP (!) 146/81   Pulse 82   Temp 98.4  F (36.9  C) (Tympanic)   Resp 20   Ht 1.575 m (5' 2\")   Wt 89 kg (196 lb 3.2 oz)   SpO2 93%   BMI 35.89 kg/m    Exam shows some LE edema and shortness of breath.  Her heart sounds are normal but lungs have rales, rhonchi and wheezes. She cannot speak in full sentences. Abdomen is soft and non-tender with normal bowel sounds.  We started an IV and gave Lasix 20 mg. EKG shows new T wave inversions and QTc of 501, both new since her previous in 2019.  Labs show CBC normal, BMP with glucose 126, troponin 6915, , UA implies UTI and we gave a first dose of Keflex in the ED for this (no recent UC to guide choices) and UC pending. Chest xray stable. ECHO was done in the ED and implies recent heart damage with apical akinesis and EF 30 - 35%, likely from LAD occlusion. I spoke with the patient and her daughter about this. We could offer to look for a cardiologist that would offer intervention or we could offer medical management. Given that she has no symptoms other than weakness I doubt a cath would make her feel better. They would like to stay in [place] for medical management. I spoke with Dr Upton who is admitting her.      I have reviewed the nursing notes.    I have reviewed the findings, diagnosis, and plan with the patient and her daughter    Final diagnoses:   NSTEMI (non-ST elevated myocardial infarction) (H)   Acute cystitis with hematuria       9/21/2022   [facility] AND [facility], Isaac Martinez MD  09/21/22 1134    "

## 2022-09-21 NOTE — PROGRESS NOTES
09/21/22 1615   Vital Signs   Temp 97.4  F (36.3  C)   Temp src Tympanic   Resp 20   Pulse 70   Pulse Rate Source Monitor   BP (!) 140/74   BP Location Left arm   Patient Position Semi-Egan's   Oxygen Therapy   SpO2 97 %   O2 Device None (Room air)     Writer to room with concerns from family. Pt is feeling slightly dizzy/lightheaded, nauseated, and burping frequently. Denies pain. MD Upton notified. Zofran order placed.  Caty Pereira RN on 9/21/2022 at 4:23 PM

## 2022-09-21 NOTE — ED TRIAGE NOTES
Pt arrived to ER via EMS due to a fall this am, family wanted pt brought in for evaluation today after having to call EMS yesterday for a lift assist.  Pt denies hitting head or any injuries.     Triage Assessment     Row Name 09/21/22 0804       Triage Assessment (Adult)    Airway WDL WDL       Respiratory WDL    Respiratory WDL WDL       Skin Circulation/Temperature WDL    Skin Circulation/Temperature WDL WDL       Cardiac WDL    Cardiac WDL WDL       Peripheral/Neurovascular WDL    Peripheral Neurovascular WDL WDL       Cognitive/Neuro/Behavioral WDL    Cognitive/Neuro/Behavioral WDL WDL

## 2022-09-21 NOTE — PLAN OF CARE
Goal Outcome Evaluation:  Pt is here after having a fall at home, trop's elevated. MD aware. On tele being monitored. Is A&O X4, denies having pain. Lungs with fine crackles anterior left, diminished with expiratory wheeze to the bases posterior. Slight SOB with activity. Hands and feet cool to touch. Pulses strong. No numbness/tingling. Pt fell on coccyx at home, blanchable redness present. Barrier cream applied. Repo's off side. Incontinent upon arrival to floor. Intermittent nausea relieved by crackers and zofran. Abrasion to left knee. IV saline locked. Received scheduled abx. Up assist of 2 with walker due to weakness, normally indep at home. Caty Pereira RN on 9/21/2022 at 5:56 PM    Plan of Care Reviewed With: patient, daughter     Overall Patient Progress: no change

## 2022-09-21 NOTE — PROGRESS NOTES
09/21/22 1745   Valuables   Patient Belongings remains with patient   Patient Belongings Remaining with Patient clothing;glasses;shoes   Did you bring any home meds/supplements to the hospital?  No     A               Admission:  I am responsible for any personal items that are not sent to the safe or pharmacy.  [facility] is not responsible for loss, theft or damage of any property in my possession.    Signature:  _________________________________ Date: _______  Time: _____                                              Staff Signature:  ____________________________ Date: ________  Time: _____      2nd Staff person, if patient is unable/unwilling to sign:    Signature: ________________________________ Date: ________  Time: _____     Discharge:  [facility] has returned all of my personal belongings:    Signature: _________________________________ Date: ________  Time: _____                                          Staff Signature:  ____________________________ Date: ________  Time: _____

## 2022-09-21 NOTE — PHARMACY
Pharmacy- Renal Dose Adjustment    Patient Active Problem List   Diagnosis     Bladder instability     Chronic right hip pain     Cognitive impairment     Colonoscopy refused     Coronary atherosclerosis of native coronary artery     Full dentures     Goiter     Gout     Hyperlipidemia     Hypertension     Kyphosis (acquired) (postural)     Lactose intolerance     Nonmelanoma skin cancer     Onychocryptosis     Onychomycosis     Osteopenia     Other urinary incontinence     Urinary tract infection     Unstable angina (H)     Sepsis (H)     NSTEMI (non-ST elevated myocardial infarction) (H)        Relevant Labs:  Recent Labs   Lab Test 09/21/22  0828 10/31/19  0525   WBC 7.3 8.6   HGB 12.2 11.3*    230      CrCl: 56.2 mL/min    No intake or output data in the 24 hours ending 09/21/22 1335       Per Renal Dose Adjustment Protocol, will adjust:  -Enoxaparin to 70 mg Q12H (0.75 mg/kg) --> treatment dose, CrCl 30-60 mL/min. This was discussed with Dr. Upton and dose decrease from original order was approved.    Will continue to follow and make adjustments accordingly. Thank You.    Hardik Shafer [facility] ....................  9/21/2022   1:35 PM

## 2022-09-21 NOTE — PROGRESS NOTES
"[facility] ADMISSION NOTE    Patient admitted to room 311 at approximately 1256 via cart from emergency room. Patient was accompanied by daughter.     Verbal SBAR report received from LINDA Mock prior to patient arrival.     Patient trasferred to bed via slip. Patient alert and oriented X 3. The patient is not having any pain.  . Admission vital signs: Blood pressure 116/72, pulse 85, temperature 99.1  F (37.3  C), temperature source Tympanic, resp. rate 16, height 1.575 m (5' 2\"), weight 89 kg (196 lb 3.2 oz), SpO2 94 %. Patient was oriented to plan of care, call light, bed controls, tv, telephone, bathroom and visiting hours.     Risk Assessment    The following safety risks were identified during admission: fall. Yellow risk band applied: YES.     Caty Pereira RN    "

## 2022-09-21 NOTE — H&P
[facility]    History and Physical - Hospitalist Service       Date of Admission:  9/21/2022    Assessment & Plan      Niru Durham is a 89 year old female admitted on 9/21/2022. She presents with a fall.     Principal Problem:    NSTEMI (non-ST elevated myocardial infarction) (H)  Assessment: Present on admission.  Troponin significantly elevated at 6900, increasing with follow-up to 7200.  Patient does not have any chest pain or shortness of breath, but does have frequent belching.  Per her family these are similar symptoms to when she had a non-ST elevation MI in 2014.  In the emergency department, echocardiogram shows wall motion abnormalities that could be either LAD distribution or stress cardiomyopathy.  Today is the 1 month anniversary of her son's death and she is quite tearful, stress cardiomyopathy could certainly be playing a role.  However, given her history of coronary artery disease in the past we cannot ignore the idea of this being a non-ST elevation MI.  I discussed with the patient and her daughter that we do not have ability to transfer her to a Cath Lab, as all hospitals in the region are not excepting transfers.  They understand this and are in agreement for medical management at this time.  We discussed CODE STATUS, and the patient has elected to be DO NOT RESUSCITATE.  Plan: Admit to the hospital  Telemetry  Continue metoprolol XL  Start lisinopril 2.5 mg daily  High intensity atorvastatin 80 mg daily  Dual antiplatelet therapy with baby aspirin and Brilinta  48 hours of Lovenox  Follow serial troponins  Plan outpatient cardiology follow-up    Active Problems:    Coronary atherosclerosis of native coronary artery  Assessment: 2014 non-ST elevation MI with JANE to LAD and RCA      Hyperlipidemia  Assessment: chronic  Plan: change to atorvastatin from pravachol      Hypertension  Assessment: chronic  Plan: monitor      Urinary tract infection  Assessment: present on  "admission   Plan: empiric ceftriaxone     Acute on chronic systolic heart failure   Assessment: EF in 2014 55%, now 30-35% with wall motion abnormalities   Plan: lisinopril, Toprol, monitor volume status       Diet:   Regular  DVT Prophylaxis: Enoxaparin (Lovenox) SQ  Scott Catheter: Not present  Central Lines: None  Cardiac Monitoring: ACTIVE order. Indication: AMI (NSTEMI/ STEMI) (48 hours)  Code Status: No CPR- Do NOT Intubate            Disposition Plan      Expected Discharge Date: 09/23/2022                The patient's care was discussed with the Patient.    Arvind Upton MD  Hospitalist Service  [facility]  Securely message with the Vocera Web Console (learn more here)  Text page via [facility] Paging/Directory         ______________________________________________________________________    Chief Complaint   fall    History is obtained from the patient    History of Present Illness   Niru Durham is a 89 year old female who presents to the emergency department after falling twice in 24 hours.  She lives independently at her home, and yesterday got her feet tangled up in her pajamas and fell on the ground.  She did not injure herself or hit her head.  This morning her legs felt \"dead\" and she landed on her backside.  She did not have a syncopal spell.  She did not have vertigo.  She is able to move her left and right side symmetrically.  Because of these 2 falls she came to our emergency department for evaluation.  Part of her work-up today shows a troponin of 6915.4.  EKG shows T wave inversions in the anterior and lateral leads that was not there in 2014.    She denies any shortness of breath.  She denies any chest pain.  No nausea or vomiting however she is burping frequently.  Her family relates that that was a similar symptom to her 2014 non-ST elevation MI.  She received drug-eluting stents to the LAD and right coronary artery at that time.    The patient was admitted for " further management.    Review of Systems    CONSTITUTIONAL: NEGATIVE for fever, chills, change in weight  INTEGUMENTARY/SKIN: NEGATIVE for worrisome rashes, moles or lesions  EYES: NEGATIVE for vision changes or irritation  ENT/MOUTH: NEGATIVE for ear, mouth and throat problems  RESP: NEGATIVE for significant cough or SOB  CV: NEGATIVE for chest pain, palpitations or peripheral edema  GI: POSITIVE for dyspepsia and gas or bloating  : NEGATIVE for frequency, dysuria, or hematuria  MUSCULOSKELETAL: NEGATIVE for significant arthralgias or myalgia  NEURO: POSITIVE for falls x2.  ENDOCRINE: NEGATIVE for temperature intolerance, skin/hair changes  HEME: NEGATIVE for bleeding problems  PSYCHIATRIC: NEGATIVE for changes in mood or affect    Past Medical History    I have reviewed this patient's medical history and updated it with pertinent information if needed.   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  Past Surgical History:   Procedure Laterality Date     APPENDECTOMY OPEN      1944     CHOLECYSTECTOMY      1971     COLONOSCOPY      02/28/05,2 hyperplastic polyps. Dr Matthews. Repeat 10 years     ENDOSCOPIC STRIPPING VEIN(S)      1971,Bilateral vein stripping     EXTRACAPSULAR CATARACT EXTRATION WITH INTRAOCULAR LENS IMPLANT      Bilateral cataract surgery with lens implant      HYSTERECTOMY TOTAL ABDOMINAL, BILATERAL SALPINGO-OOPHORECTOMY, COMBINED           OTHER SURGICAL HISTORY      , ,2051,DILATION AND CURETTAGE       Social History   I have reviewed this patient's social history and updated it with pertinent information if needed.  Social History     Tobacco Use     Smoking status: Former Smoker     Packs/day: 0.10     Years: 2.00     Pack years: 0.20     Types: Cigarettes     Quit date: 1983     Years since quittin.5     Smokeless tobacco: Never Used   Substance Use Topics     Alcohol use: No     Drug use: Unknown     Types: Other     Comment: Drug use: No       Family History   I have reviewed this patient's family history and updated it with pertinent information if needed.  Family History   Problem Relation Age of Onset     Diabetes Mother         Diabetes,diabetic complications     Breast Cancer No family hx of         Cancer-breast       Prior to Admission Medications   Prior to Admission Medications   Prescriptions Last Dose Informant Patient Reported? Taking?   Cranberry 1000 MG CAPS 2022 at PM Self Yes Yes   Sig: Take 1 tablet by mouth daily (with lunch)   acetaminophen (TYLENOL) 500 MG tablet 2022 at PM Self Yes Yes   Sig: Take 1,000 mg by mouth every 8 hours as needed for pain   aspirin 81 MG EC tablet 2022 at AM Self Yes Yes   Sig: Take 81 mg by mouth daily   metoprolol succinate ER (TOPROL XL) 50 MG 24 hr tablet 2022 at AM Self Yes Yes   Sig: Take 50 mg by mouth daily   nitroGLYcerin (NITROSTAT) 0.4 MG sublingual tablet More than a month at Unknown time Self Yes Yes   Sig: Place 0.4 mg under the tongue every 5 minutes as needed for chest pain    oxybutynin ER (DITROPAN XL) 5 MG 24 hr tablet 2022 at AM Self Yes Yes   Sig: Take 5 mg by mouth 2 times daily   potassium 99 MG TABS 2022 at PM Self Yes Yes   Sig: Take 1 tablet by mouth daily (with lunch)   pravastatin (PRAVACHOL) 20 MG tablet 2022 at PM Self Yes Yes    Sig: Take 20 mg by mouth daily (with lunch)   vitamin C (ASCORBIC ACID) 1000 MG TABS 2022 at AM Self Yes Yes   Sig: Take 1,000 mg by mouth daily      Facility-Administered Medications: None     Allergies   Allergies   Allergen Reactions     Codeine Nausea     Naproxen Nausea and Vomiting       Physical Exam   Vital Signs: Temp: 99.1  F (37.3  C) Temp src: Tympanic BP: (!) 140/74 Pulse: 70   Resp: 20 SpO2: 97 % O2 Device: None (Room air)    Weight: 196 lbs 3.2 oz    Constitutional: In no apparent distress  Eyes: pupils reactive, extraocular movements intact. Anicteric sclera.   HEENT: Oropharynx nonerythematous. Neck supple, no JVD.  Respiratory: no crackles noted, no wheezes.  Cardiovascular: regular, no murmur. no lower extremity edema.  GI: soft, non-tender, bowel sounds present.  Lymph/Hematologic: no cervical or supraclavicular LAD.  Genitourinary: deferred  Skin: no rashes, or sores  Musculoskeletal: no joint erythema or swelling  Neurologic: cranial nerves symmetric. Neuro exam nonfocal  Psychiatric: alert and oriented x3. Interactive.       Data   Data reviewed today: I reviewed all medications, new labs and imaging results over the last 24 hours. I personally reviewed the EKG tracing showing NSR, T wave inversions in anterior and lateral leads..    Recent Labs   Lab 22  0828   WBC 7.3   HGB 12.2   MCV 88         POTASSIUM 3.9   CHLORIDE 103   CO2 27   BUN 19   CR 0.69   ANIONGAP 9   ОЛЬГА 10.1   *     Recent Results (from the past 24 hour(s))   Echocardiogram Complete   Result Value    Biplane LVEF 33%    LVEF  30-35% (moderately reduced)    Narrative    [id number]  [id number]  [id number]  [id number]^LIANNE^LYN^[facility]  [address]  [place] [address]     Name: MELIZA SMITH  MRN: [id number]  : 1931  Study Date: 2022 09:24 AM  Age: 89 yrs  Gender: Female  Patient Location: [facility]  Reason For Study: Abn EKG  Ordering  Physician: LYN ABDALLA  Performed By: Madelyn Vila RDCS, RVT     BSA: 1.9 m2  Height: 62 in  Weight: 196 lb  HR: 94  BP: 146/81 mmHg  ______________________________________________________________________________  Procedure  Complete Portable Echo Adult. Contrast Definity. Definity (NDC #65250-242-83)  given intravenously. Patient was given 4ml mixture of 1.5ml Definity and 8.5ml  saline. 6 ml wasted. Definity Lot # 1325 .  ______________________________________________________________________________  Interpretation Summary  Left ventricular function is decreased. The ejection fraction is 30-35%  (moderately reduced).  Large area of apical akinesis suggestive of LAD obstructive CAD. Alternate  diagnosis is stress CM.  Diastolic Doppler findings (E/E' ratio and/or other parameters) suggest left  ventricular filling pressures are increased.  Global right ventricular function is normal.  No significant valvular abnormalities present.  Mild pulmonary hypertension is present.  IVC diameter <2.1 cm collapsing >50% with sniff suggests a normal RA pressure  of 3 mmHg.  No pericardial effusion is present.  There is no prior study for direct comparison.  ______________________________________________________________________________  Left Ventricle  Left ventricular wall thickness is normal. Biplane LVEF is 33%. Mild left  ventricular dilation is present. Left ventricular function is decreased. The  ejection fraction is 30-35% (moderately reduced). Grade I or early diastolic  dysfunction. Diastolic Doppler findings (E/E' ratio and/or other parameters)  suggest left ventricular filling pressures are increased. There is no  thrombus.     Right Ventricle  The right ventricle is normal size. Global right ventricular function is  normal.     Atria  Both atria appear normal.     Mitral Valve  The mitral valve is normal.     Aortic Valve  Aortic valve is normal in structure and function. Mild aortic valve  calcification  is present.     Tricuspid Valve  The tricuspid valve is normal. The right ventricular systolic pressure is  approximated at 32.1 mmHg plus the right atrial pressure. Mild pulmonary  hypertension is present.     Pulmonic Valve  On Doppler interrogation, there is no significant stenosis or regurgitation.     Vessels  The thoracic aorta is normal. IVC diameter <2.1 cm collapsing >50% with sniff  suggests a normal RA pressure of 3 mmHg.     Pericardium  No pericardial effusion is present.     Miscellaneous  No significant valvular abnormalities present.     Compared to Previous Study  There is no prior study for direct comparison.  ______________________________________________________________________________  MMode/2D Measurements & Calculations     IVSd: 0.76 cm  LVIDd: 5.4 cm  LVIDs: 3.6 cm  LVPWd: 0.84 cm  FS: 33.5 %  LV mass(C)d: 153.6 grams  LV mass(C)dI: 81.1 grams/m2  Ao root diam: 3.2 cm  asc Aorta Diam: 3.3 cm  LVOT diam: 2.1 cm  LVOT area: 3.5 cm2  LA Volume (BP): 34.5 ml  LA Volume Index (BP): 18.2 ml/m2  RWT: 0.31     Doppler Measurements & Calculations  MV E max sanjiv: 92.7 cm/sec  MV A max sanjiv: 148.0 cm/sec  MV E/A: 0.63  MV dec slope: 542.0 cm/sec2  MV dec time: 0.17 sec  Ao V2 max: 135.0 cm/sec  Ao max P.0 mmHg  Ao V2 mean: 97.8 cm/sec  Ao mean P.0 mmHg  Ao V2 VTI: 25.5 cm  SARKIS(I,D): 2.4 cm2  SARKIS(V,D): 2.6 cm2  LV V1 max P.0 mmHg  LV V1 max: 100.0 cm/sec  LV V1 VTI: 17.8 cm  SV(LVOT): 61.7 ml  SI(LVOT): 32.5 ml/m2  PA acc time: 0.08 sec  TR max sanjiv: 279.7 cm/sec  TR max P.1 mmHg  AV Sanjiv Ratio (DI): 0.74  SARKIS Index (cm2/m2): 1.3  E/E' av.1  Lateral E/e': 18.5  Medial E/e': 23.6     ______________________________________________________________________________  Report approved by: Ramya Plata 2022 10:58 AM         XR Chest Port 1 View    Narrative    Procedure:XR CHEST PORT 1 VIEW    Clinical history:Female, 89 years, shortness of breath    Technique: Single view was  obtained.    Comparison: 10/20/2019    Findings: The cardiac silhouette is normal. The pulmonary vasculature  is normal.    The lungs are clear. Bony structures are unremarkable. No  pneumothorax.      Impression    Impression:   No acute abnormality. No evidence of acute or active cardiopulmonary  disease.    LOIDA HANNA MD         SYSTEM ID:  [id number]

## 2022-09-21 NOTE — PHARMACY-ADMISSION MEDICATION HISTORY
Pharmacy -- Admission Medication Reconciliation    Prior to admission (PTA) medications were reviewed and the patient's PTA medication list was updated.    Sources Consulted: patient, sure scripts    The reliability of this Medication Reconciliation is: Reliability: Reliable    The following significant changes were made:  REMOVED:  Albuterol nebs - patient states she does not have a neb machine at home and has never used this  Ketoconazole cream    ADDED:  Vitamin C  Potassium OTC    CHANGED:  Oxybutynin to 5 mg ER BID  Pravastatin to 20 mg daily    In addition, the patient's allergies were reviewed with the patient and updated as follows:   Allergies: Codeine and Naproxen    The pharmacist has reviewed with the patient that all personal medications should be removed from the building or locked in the belongings safe.  Patient shall only take medications ordered by the physician and administered by the nursing staff.       Medication barriers identified: none noted   Medication adherence concerns: none - patient very knowledgeable on medications   Understanding of emergency medications: has nitroglycerin in her purse at all times      Valeria Smith [facility], 9/21/2022,  2:03 PM

## 2022-09-22 NOTE — PROGRESS NOTES
SAFETY CHECKLIST  ID Bands and Risk clasps correct and in place (DNR, Fall risk, Allergy, Latex, Limb):  Yes  All Lines Reconciled and labeled correctly: Yes  Whiteboard updated:Yes  Environmental interventions (bed/chair alarm on, call light, side rails, restraints, sitter....): Yes  Verify Tele #: [id number]

## 2022-09-22 NOTE — PLAN OF CARE
Goal Outcome Evaluation:  Patient admitted with NSTEMI; Alert & oriented; VS stable; Denies pain; [id number]; Zofran administered x 1 for nausea, belching & retching; Saltine crackers helpful with nausea; sprite provided to help settle stomach; Plan of Care Reviewed With: patient, daughter     Overall Patient Progress: no change

## 2022-09-22 NOTE — PROGRESS NOTES
[facility]    Medicine Progress Note - Hospitalist Service    Date of Admission:  9/21/2022    Assessment & Plan               Niru Durham is a 89 year old female admitted on 9/21/2022. She presents with a fall.     Principal Problem:    NSTEMI (non-ST elevated myocardial infarction) (H)  Assessment: Present on admission.  Troponin significantly elevated at 6900, increasing with follow-up to 7200.  Patient does not have any chest pain or shortness of breath, but does have frequent belching.  Per her family these are similar symptoms to when she had a non-ST elevation MI in 2014.  In the emergency department, echocardiogram shows wall motion abnormalities that could be either LAD distribution or stress cardiomyopathy.  Today is the 1 month anniversary of her son's death and she is quite tearful, stress cardiomyopathy could certainly be playing a role.  However, given her history of coronary artery disease in the past we cannot ignore the idea of this being a non-ST elevation MI.  I discussed with the patient and her daughter that we do not have ability to transfer her to a Cath Lab, as all hospitals in the region are not excepting transfers.  They understand this and are in agreement for medical management at this time.  We discussed CODE STATUS, and the patient has elected to be DO NOT RESUSCITATE.    9/22/2022 - no chest pain overnight. Mild hypoxia. No complaints. Troponin peaked and was down to 2876.3 from 7177.    Plan: Admit to the hospital  Telemetry  Continue metoprolol XL  Continue lisinopril 2.5 mg daily  High intensity atorvastatin 80 mg daily  Dual antiplatelet therapy with baby aspirin and Brilinta  48 hours of Lovenox total  Plan outpatient cardiology follow-up  Physical and occupational therapy to see patient. SW consult for likely skilled nursing facility on discharge.     Active Problems:    Coronary atherosclerosis of native coronary artery  Assessment: 2014 non-ST  elevation MI with JANE to LAD and RCA      Hyperlipidemia  Assessment: chronic  Plan: change to atorvastatin from pravachol      Hypertension  Assessment: chronic  Plan: monitor      Urinary tract infection  Assessment: present on admission   Plan: empiric ceftriaxone     Acute on chronic systolic heart failure   Assessment: EF in 2014 55%, now 30-35% with wall motion abnormalities   Plan: lisinopril, Toprol, monitor volume status         Diet: Regular Diet Adult    DVT Prophylaxis: Enoxaparin (Lovenox) SQ  Scott Catheter: Not present  Central Lines: None  Cardiac Monitoring: ACTIVE order. Indication: AMI (NSTEMI/ STEMI) (48 hours)  Code Status: No CPR- Do NOT Intubate      The patient's care was discussed with the Patient and Patient's Family.    Arvind Upton MD  Hospitalist Service  [facility]  Securely message with the Vocera Web Console (learn more here)  Text page via [employer] Paging/Directory           Interval History   Feeling OK, no chest pain. No fevers, chills.     Data reviewed today: I reviewed all medications, new labs and imaging results over the last 24 hours. I personally reviewed no images or EKG's today.    Physical Exam   Vital Signs: Temp: 99.6  F (37.6  C) Temp src: Tympanic BP: 112/65 Pulse: 79   Resp: 22 SpO2: 92 % O2 Device: None (Room air) Oxygen Delivery: 1 LPM  Weight: 205 lbs 12.8 oz  GENERAL: Comfortable, no apparent distress.  CARDIOVASCULAR: regular rate and rhythm, no murmur. No lower extremity edema   RESPIRATORY: Clear to auscultation bilaterally, no wheezes or crackles.  GI: non-tender, non-distended, normal bowel sounds.   SKIN: warm periphery, no rashes      Data   Recent Labs   Lab 09/22/22  0734 09/21/22  0828   WBC 8.3 7.3   HGB 12.5 12.2   MCV 89 88    246    139   POTASSIUM 3.9 3.9   CHLORIDE 100 103   CO2 30 27   BUN 19 19   CR 0.80 0.69   ANIONGAP 7 9   ОЛЬГА 9.6 10.1   * 126*     Recent Results (from the past 24 hour(s))    Echocardiogram Complete   Result Value    Biplane LVEF 33%    LVEF  30-35% (moderately reduced)    [facility]    [id number]  [id number]  [id number]  [id number]^LIANNE^LYN^ORALIA     [facility]  [address] [place] [address]     Name: MELIZA SMITH  MRN: [id number]  : 1931  Study Date: 2022 09:24 AM  Age: 89 yrs  Gender: Female  Patient Location: [facility]  Reason For Study: Abn EKG  Ordering Physician: LYN ABDALLA  Performed By: Madelyn Vila, JOHN PAUL, RVT     BSA: 1.9 m2  Height: 62 in  Weight: 196 lb  HR: 94  BP: 146/81 mmHg  ______________________________________________________________________________  Procedure  Complete Portable Echo Adult. Contrast Definity. Definity (NDC #29287-022-45)  given intravenously. Patient was given 4ml mixture of 1.5ml Definity and 8.5ml  saline. 6 ml wasted. Definity Lot # [id number] .  ______________________________________________________________________________  Interpretation Summary  Left ventricular function is decreased. The ejection fraction is 30-35%  (moderately reduced).  Large area of apical akinesis suggestive of LAD obstructive CAD. Alternate  diagnosis is stress CM.  Diastolic Doppler findings (E/E' ratio and/or other parameters) suggest left  ventricular filling pressures are increased.  Global right ventricular function is normal.  No significant valvular abnormalities present.  Mild pulmonary hypertension is present.  IVC diameter <2.1 cm collapsing >50% with sniff suggests a normal RA pressure  of 3 mmHg.  No pericardial effusion is present.  There is no prior study for direct comparison.  ______________________________________________________________________________  Left Ventricle  Left ventricular wall thickness is normal. Biplane LVEF is 33%. Mild left  ventricular dilation is present. Left ventricular function is decreased. The  ejection fraction is 30-35% (moderately reduced). Grade I or early  diastolic  dysfunction. Diastolic Doppler findings (E/E' ratio and/or other parameters)  suggest left ventricular filling pressures are increased. There is no  thrombus.     Right Ventricle  The right ventricle is normal size. Global right ventricular function is  normal.     Atria  Both atria appear normal.     Mitral Valve  The mitral valve is normal.     Aortic Valve  Aortic valve is normal in structure and function. Mild aortic valve  calcification is present.     Tricuspid Valve  The tricuspid valve is normal. The right ventricular systolic pressure is  approximated at 32.1 mmHg plus the right atrial pressure. Mild pulmonary  hypertension is present.     Pulmonic Valve  On Doppler interrogation, there is no significant stenosis or regurgitation.     Vessels  The thoracic aorta is normal. IVC diameter <2.1 cm collapsing >50% with sniff  suggests a normal RA pressure of 3 mmHg.     Pericardium  No pericardial effusion is present.     Miscellaneous  No significant valvular abnormalities present.     Compared to Previous Study  There is no prior study for direct comparison.  ______________________________________________________________________________  MMode/2D Measurements & Calculations     IVSd: 0.76 cm  LVIDd: 5.4 cm  LVIDs: 3.6 cm  LVPWd: 0.84 cm  FS: 33.5 %  LV mass(C)d: 153.6 grams  LV mass(C)dI: 81.1 grams/m2  Ao root diam: 3.2 cm  asc Aorta Diam: 3.3 cm  LVOT diam: 2.1 cm  LVOT area: 3.5 cm2  LA Volume (BP): 34.5 ml  LA Volume Index (BP): 18.2 ml/m2  RWT: 0.31     Doppler Measurements & Calculations  MV E max cristy: 92.7 cm/sec  MV A max cristy: 148.0 cm/sec  MV E/A: 0.63  MV dec slope: 542.0 cm/sec2  MV dec time: 0.17 sec  Ao V2 max: 135.0 cm/sec  Ao max P.0 mmHg  Ao V2 mean: 97.8 cm/sec  Ao mean P.0 mmHg  Ao V2 VTI: 25.5 cm  SARKIS(I,D): 2.4 cm2  SARKIS(V,D): 2.6 cm2  LV V1 max P.0 mmHg  LV V1 max: 100.0 cm/sec  LV V1 VTI: 17.8 cm  SV(LVOT): 61.7 ml  SI(LVOT): 32.5 ml/m2  PA acc time: 0.08 sec  TR max  sanjiv: 279.7 cm/sec  TR max P.1 mmHg  AV Sanjiv Ratio (DI): 0.74  SARKIS Index (cm2/m2): 1.3  E/E' av.1  Lateral E/e': 18.5  Medial E/e': 23.6     ______________________________________________________________________________  Report approved by: Ramya Plata 2022 10:58 AM         XR Chest Port 1 View    Narrative    Procedure:XR CHEST PORT 1 VIEW    Clinical history:Female, 89 years, shortness of breath    Technique: Single view was obtained.    Comparison: 10/20/2019    Findings: The cardiac silhouette is normal. The pulmonary vasculature  is normal.    The lungs are clear. Bony structures are unremarkable. No  pneumothorax.      Impression    Impression:   No acute abnormality. No evidence of acute or active cardiopulmonary  disease.    LOIDA HANNA MD         SYSTEM ID:  [id number]     Medications     - MEDICATION INSTRUCTIONS -       - MEDICATION INSTRUCTIONS -         aspirin  81 mg Oral Daily     atorvastatin  80 mg Oral Daily at 8 pm     cefTRIAXone  1 g Intravenous Q24H     enoxaparin ANTICOAGULANT  0.75 mg/kg Subcutaneous Q12H     lisinopril  2.5 mg Oral Daily     metoprolol succinate ER  50 mg Oral Daily     sodium chloride (PF)  10 mL Intracatheter Q8H     ticagrelor  90 mg Oral BID

## 2022-09-22 NOTE — PROGRESS NOTES
:     Met with patient in room to discuss discharge planning needs. Patient stated that she is agreeable to going to a SNF for short term rehab.     Pt/family was given the Medicare Compare list for SNF, with associated star ratings to assist with choice for referrals/discharge planning Yes    Education was given to pt/family that star ratings are updated/maintained by Medicare and can be reviewed by visiting www.medicare.gov Yes    Patient stated that she would like a referral sent to [facility]. A referral has been sent and PRASANTH has been notified of this referral.      will continue to follow for discharge planning needs.    JAYLEEN Cooper on 9/22/2022 at 10:17 AM

## 2022-09-22 NOTE — PLAN OF CARE
"Low grade temp 100.2, md notified and VORB for 650 tylenol every 4 hours PRN.   low b/p's this morning, lisinopril held and now wdl. Fine crackles bilateral lower lobes, O2 87% on RA while in bed and oxygen applied at 2 lpm this morning but now weaned to RA, dyspnea upon exertion with activity.  EZ stand to transfer, up to chair for breakfast. Incontinent of urine at times, external cath in place, will continue to monitor.  /71   Pulse 83   Temp 100.2  F (37.9  C) (Tympanic)   Resp 18   Ht 1.575 m (5' 2\")   Wt 93.4 kg (205 lb 12.8 oz)   SpO2 92%   BMI 37.64 kg/m            Goal Outcome Evaluation:    Plan of Care Reviewed With: patient, daughter     Overall Patient Progress: improving       Problem: Plan of Care - These are the overarching goals to be used throughout the patient stay.    Goal: Patient-Specific Goal (Individualized)    Outcome: Ongoing, Progressing  Flowsheets (Taken 9/22/2022 1449)  Individualized Care Needs: glasses  Patient-Specific Goals (Include Timeframe): monitor vitals and labs         Problem: Fall Injury Risk  Goal: Absence of Fall and Fall-Related Injury  Intervention: Promote Injury-Free Environment  Recent Flowsheet Documentation  Taken 9/22/2022 6459 by Regina Jones, RN  Safety Promotion/Fall Prevention:    activity supervised    bed alarm on    chair alarm on    clutter free environment maintained    fall prevention program maintained    nonskid shoes/slippers when out of bed    safety round/check completed     "

## 2022-09-22 NOTE — PLAN OF CARE
"Patient denies pain. Repositioned in bed for supper. Vital signs stable.  Problem: Plan of Care - These are the overarching goals to be used throughout the patient stay.    Goal: Plan of Care Review/Shift Note  Description: The Plan of Care Review/Shift note should be completed every shift.  The Outcome Evaluation is a brief statement about your assessment that the patient is improving, declining, or no change.  This information will be displayed automatically on your shift note.  Outcome: Ongoing, Progressing  Flowsheets (Taken 9/22/2022 1808)  Plan of Care Reviewed With:   patient   daughter  Overall Patient Progress: improving  Goal: Patient-Specific Goal (Individualized)  Description: You can add care plan individualizations to a care plan. Examples of Individualization might be:  \"Parent requests to be called daily at 9am for status\", \"I have a hard time hearing out of my right ear\", or \"Do not touch me to wake me up as it startles me\".  Outcome: Ongoing, Progressing  Goal: Absence of Hospital-Acquired Illness or Injury  Outcome: Ongoing, Progressing  Goal: Optimal Comfort and Wellbeing  Outcome: Ongoing, Progressing  Goal: Readiness for Transition of Care  Outcome: Ongoing, Progressing     Problem: Fall Injury Risk  Goal: Absence of Fall and Fall-Related Injury  Outcome: Ongoing, Progressing   Goal Outcome Evaluation:                      " patient

## 2022-09-23 NOTE — PROGRESS NOTES
[facility]    Medicine Progress Note - Hospitalist Service    Date of Admission:  9/21/2022    Assessment & Plan          Niru Durham is a 89 year old female admitted on 9/21/2022. She presents with a fall.     Principal Problem:    NSTEMI (non-ST elevated myocardial infarction) (H)  Assessment: Present on admission.  Troponin significantly elevated at 6900, increasing with follow-up to 7200.  Patient does not have any chest pain or shortness of breath. echocardiogram shows wall motion abnormalities that could be either LAD distribution or stress cardiomyopathy.  It is the 1 month anniversary of her son's death and she is quite tearful, stress cardiomyopathy could certainly be playing a role.  However, given her history of coronary artery disease in the past we cannot ignore the idea of this being a non-ST elevation MI.  I discussed with the patient and her daughter that we do not have ability to transfer her to a Cath Lab, as all hospitals in the region are not excepting transfers.  They understand this and are in agreement for medical management at this time.  We discussed CODE STATUS, and the patient has elected to be DO NOT RESUSCITATE.  Plan:   Continue metoprolol XL  Start lisinopril 2.5 mg daily  High intensity atorvastatin 80 mg daily  Dual antiplatelet therapy with baby aspirin and Brilinta  48 hours of Lovenox-discontinue today  Follow serial troponins-trending down  Plan outpatient cardiology follow-up    Active Problems:    Coronary atherosclerosis of native coronary artery  Assessment: 2014 non-ST elevation MI with JANE to LAD and RCA      Hyperlipidemia  Assessment: chronic  Plan: change to atorvastatin from pravachol      Hypertension  Assessment: chronic  Plan: monitor      Urinary tract infection-E coli.   Assessment: present on admission   Plan: empiric ceftriaxone     Acute on chronic systolic heart failure   Assessment: EF in 2014 55%, now 30-35% with wall motion  "abnormalities   Plan: lisinopril, Toprol, monitor volume status       Diet: Regular Diet Adult    DVT Prophylaxis: Enoxaparin (Lovenox) SQ  Scott Catheter: Not present  Central Lines: None  Cardiac Monitoring: ACTIVE order. Indication: AMI (NSTEMI/ STEMI) (48 hours)  Code Status: No CPR- Do NOT Intubate      Disposition Plan           The patient's care was discussed with the Patient.    Yoselyn Zuñiga DO  Hospitalist Service  [facility]  Securely message with the Vocera Web Console (learn more here)  Text page via [employer] Paging/Directory         Clinically Significant Risk Factors Present on Admission               # Obesity: Estimated body mass index is 36.96 kg/m  as calculated from the following:    Height as of this encounter: 1.575 m (5' 2\").    Weight as of this encounter: 91.7 kg (202 lb 1.6 oz).        __________________________________________________________________    Interval History   Doing well today.  No chest pain or shortness of breath.  Hoping to discharge to [facility] when she is ready.  No nausea or vomiting.  Tolerating medications well.    Data reviewed today: I reviewed all medications, new labs and imaging results over the last 24 hours. I personally reviewed no images or EKG's today.    Physical Exam   Vital Signs: Temp: 99.7  F (37.6  C) Temp src: Tympanic BP: 124/56 Pulse: 81   Resp: 20 SpO2: 93 % O2 Device: Nasal cannula Oxygen Delivery: 2 LPM  Weight: 202 lbs 1.6 oz  General Appearance: Awake alert and oriented.  No acute distress.  Appears well for her stated age  Respiratory: Clear to auscultation bilaterally.  No wheezing rhonchi or rales  Cardiovascular: Regular rate.  No murmur mild bilateral nonpitting edema  GI: Abdomen soft, nontender, nondistended  Skin: Warm and dry  Other: Appropriate affect and memory recall    Data   Recent Labs   Lab 09/23/22  0551 09/22/22  0734 09/21/22  0828   WBC 7.9 8.3 7.3   HGB 11.7 12.5 12.2   MCV 90 89 88    030 " 246    137 139   POTASSIUM 3.9 3.9 3.9   CHLORIDE 102 100 103   CO2 29 30 27   BUN 21 19 19   CR 0.78 0.80 0.69   ANIONGAP 7 7 9   ОЛЬГА 9.3 9.6 10.1   * 147* 126*     No results found for this or any previous visit (from the past 24 hour(s)).

## 2022-09-23 NOTE — PLAN OF CARE
"Afebrile,vss. Increased sob,dyspnea upon exertion, crackles in bilateral lower lobes, O2 94-96% on acute 2 lpm nc, one time lasix dose given. Incontinent of urine, purewick in place. Up to recliner with 2 assist and walker.   Problem: Plan of Care - These are the overarching goals to be used throughout the patient stay.    Goal: Patient-Specific Goal (Individualized)  9/23/2022 1046 by Regina Jones RN  Flowsheets (Taken 9/23/2022 1046)  Individualized Care Needs: wears glasses  Patient-Specific Goals (Include Timeframe): monitor vitals and oxygen level   Goal Outcome Evaluation:    Plan of Care Reviewed With: patient, caregiver     Overall Patient Progress: no change         /69 (BP Location: Left arm)   Pulse 70   Temp 98.4  F (36.9  C) (Tympanic)   Resp 20   Ht 1.575 m (5' 2\")   Wt 91.7 kg (202 lb 1.6 oz)   SpO2 96%   BMI 36.96 kg/m      "

## 2022-09-23 NOTE — PROGRESS NOTES
09/23/22 1525   Quick Adds   Type of Visit Initial Occupational Therapy Evaluation   Living Environment   People in Home alone   Current Living Arrangements house   Home Accessibility no concerns   Transportation Anticipated family or friend will provide   Self-Care   Usual Activity Tolerance moderate   Current Activity Tolerance poor   Equipment Currently Used at Home walker, rolling   Cognitive Status Examination   Orientation Status orientation to person, place and time   Affect/Mental Status (Cognitive) WFL   Follows Commands WFL   Pain Assessment   Patient Currently in Pain No   Range of Motion Comprehensive   General Range of Motion no range of motion deficits identified   Coordination   Upper Extremity Coordination No deficits were identified   Bed Mobility   Bed Mobility supine-sit   Supine-Sit [place] (Bed Mobility) moderate assist (50% patient effort);2 person assist   Transfers   Transfers bed-chair transfer   Transfer Skill: Bed to Chair/Chair to Bed   Bed-Chair [place] (Transfers) moderate assist (50% patient effort);2 person assist   Assistive Device (Bed-Chair Transfers) rolling walker   Clinical Impression   Criteria for Skilled Therapeutic Interventions Met (OT) Yes, treatment indicated   OT Diagnosis s/p fall   Influenced by the following impairments weakness   OT Problem List-Impairments impacting ADL activity tolerance impaired;balance;strength   Assessment of Occupational Performance 1-3 Performance Deficits   Identified Performance Deficits mobility and self care   Planned Therapy Interventions (OT) ADL retraining;progressive activity/exercise   Clinical Decision Making Complexity (OT) low complexity   Risk & Benefits of therapy have been explained risks/benefits reviewed   OT Discharge Planning   OT Discharge Recommendation (DC Rec) Transitional Care Facility   OT Rationale for DC Rec pt would benefit from on-going therapies at [facility]   OT Brief overview of current status Pt very  pleasant, motivated to try to get OOB without using stand lift, pt moved supine to sit with mod assist of 2, and transfered from bed to recliner with FWW and mod assist of 2 for safety, took a few forward and side steps. Pt completed lgiht U/B self cares while seated with set up and cues.   Total Evaluation Time (Minutes)   Total Evaluation Time (Minutes) 15   OT Goals   Therapy Frequency (OT) Daily   OT Predicted Duration/Target Date for Goal Attainment 09/25/22   OT Goals Upper Body Dressing;Lower Body Dressing;Transfers;Toilet Transfer/Toileting   OT: Upper Body Dressing Supervision/stand-by assist   OT: Lower Body Dressing Minimal assist   OT: Transfer Minimal assist   OT: Toilet Transfer/Toileting Moderate assist

## 2022-09-23 NOTE — PROGRESS NOTES
09/23/22 1533   Living Environment   People in Home alone   Current Living Arrangements house   Home Accessibility no concerns   Transportation Anticipated family or friend will provide   Self-Care   Usual Activity Tolerance moderate   Current Activity Tolerance poor   Equipment Currently Used at Home walker, rolling   Fall history within last six months yes   Number of times patient has fallen within last six months 1   General Information   Onset of Illness/Injury or Date of Surgery 09/21/22   Referring Physician Dr. Zuñgia   General Observations Niru is a very sweet 89 year old woman who was admitted to the hospital after falling. She was found to have an NSTEMI. She has been feeling off for a little bit but was baking cookies last week without issue. She lives at home by herself and her two dogs. However she does have family that live on the property in a different house.   Cognition   Affect/Mental Status (Cognition) WNL   Orientation Status (Cognition) person;place;situation   Follows Commands (Cognition) WNL   Pain Assessment   Patient Currently in Pain Yes, see Vital Sign flowsheet   Integumentary/Edema   Integumentary/Edema Comments Scrape on L knee   Range of Motion (ROM)   Range of Motion ROM is WFL   Strength (Manual Muscle Testing)   Strength (Manual Muscle Testing) Deficits observed during functional mobility   Bed Mobility   Bed Mobility sit-supine   Sit-Supine [place] (Bed Mobility) moderate assist (50% patient effort);2 person assist   Bed Mobility Limitations decreased ability to use legs for bridging/pushing;impaired ability to control trunk for mobility   Impairments Contributing to Impaired Bed Mobility decreased strength   Assistive Device (Bed Mobility) draw sheet;bed rails   Transfers   Transfers sit-stand transfer   Maintains Weight-bearing Status (Transfers) able to maintain   Transfer Safety Concerns Noted decreased step length;decreased weight-shifting ability   Sit-Stand  Transfer   Sit-Stand [place] (Transfers) moderate assist (50% patient effort);2 person assist   Assistive Device (Sit-Stand Transfers) walker, front-wheeled   Gait/Stairs (Locomotion)   [place] Level (Gait) moderate assist (50% patient effort);2 person assist   Assistive Device (Gait) walker, front-wheeled   Distance in Feet (Required for LE Total Joints) bedside transfer   Pattern (Gait) 3-point   Deviations/Abnormal Patterns (Gait) base of support, narrow;stride length decreased   Maintains Weight-bearing Status (Gait) able to maintain   Clinical Impression   Criteria for Skilled Therapeutic Intervention Yes, treatment indicated   PT Diagnosis (PT) impaired mobility, decreased activity tolerance   Influenced by the following impairments weakness   Functional limitations due to impairments limited endurance and position tolerance, impaired transfers and ambulation   Clinical Presentation (PT Evaluation Complexity) Stable/Uncomplicated   Clinical Decision Making (Complexity) low complexity   Planned Therapy Interventions (PT) bed mobility training;gait training;strengthening   Risk & Benefits of therapy have been explained evaluation/treatment results reviewed;patient   PT Discharge Planning   PT Discharge Recommendation (DC Rec) Transitional Care Facility   PT Rationale for DC Rec pt has below baseline levels of function. Unable to be mobile without assist including bed mobility and transferring.   PT Brief overview of current status MOD A x 2 for bed mobility and sit to stand transfer to bedside chair. Pt nob able to take more than a few steps during transfer. Fatigued during activity. On 2L of O2 at 97% during session. RN weening to RA and satting 92% at that time. Pt is not on O2 at baseline.   Plan of Care Review   Plan of Care Reviewed With patient;caregiver   Physical Therapy Goals   PT Frequency Daily   PT Predicted Duration/Target Date for Goal Attainment 09/26/22   PT Goals Gait;Bed  Mobility;Transfers   PT: Bed Mobility Minimal assist   PT: Transfers Supervision/stand-by assist   PT: Gait 50 feet;Rolling walker

## 2022-09-23 NOTE — PROGRESS NOTES
:     Met with patient in room to discuss health care directives. Answered all of patients questions related to this and she stated she will have a family member help her fill it out. Discussed mental health options in the area.     Spoke with Smita from [facility]. Patients insurance will need a prior authorization.      will continue to follow for discharge planning needs.     JAYLEEN Cooper on 9/23/2022 at 1:18 PM

## 2022-09-23 NOTE — PLAN OF CARE
Goal Outcome Evaluation:    Patient admitted with NSTEMI; Alert & oriented; Easy stand lift for OOB: VS stable; Denies pain Tylenol administered for 100.1 temp; LS: Diminished, fine crackles in right lobes; Dyspnea on exertion;    Plan of Care Reviewed With: patient     Overall Patient Progress: improving

## 2022-09-24 NOTE — PROVIDER NOTIFICATION
09/24/22 1057   Valuables   Patient Belongings remains with patient   Patient Belongings Remaining with Patient clothing;glasses;shoes   Did you bring any home meds/supplements to the hospital?  No   A               Admission:  I am responsible for any personal items that are not sent to the safe or pharmacy.  [facility] is not responsible for loss, theft or damage of any property in my possession.    Signature:  _________________________________ Date: _______  Time: _____                                              Staff Signature:  ____________________________ Date: ________  Time: _____      2nd Staff person, if patient is unable/unwilling to sign:    Signature: ________________________________ Date: ________  Time: _____     Discharge:  [facility] has returned all of my personal belongings:    Signature: _________________________________ Date: ________  Time: _____                                          Staff Signature:  ____________________________ Date: ________  Time: _____

## 2022-09-24 NOTE — PLAN OF CARE
"Pt appeared to have a decent night however reports not sleeping well, partially due to unreported hip pain.  Education given to pt about when to ask for help. Pt reports pain resolved after repositioning. Pt is calm and cooperative this morning.  Asking to order breakfast. Mild edema present in lower extremities.  /72 (BP Location: Left arm, Patient Position: Semi-Egan's)   Pulse 69   Temp 98.5  F (36.9  C) (Tympanic)   Resp 18   Ht 1.575 m (5' 2\")   Wt 90.5 kg (199 lb 9.6 oz)   SpO2 95%   BMI 36.51 kg/m          Problem: Plan of Care - These are the overarching goals to be used throughout the patient stay.    Goal: Plan of Care Review/Shift Note  Description: The Plan of Care Review/Shift note should be completed every shift.  The Outcome Evaluation is a brief statement about your assessment that the patient is improving, declining, or no change.  This information will be displayed automatically on your shift note.  Outcome: Ongoing, Progressing  Flowsheets (Taken 9/24/2022 0740)  Plan of Care Reviewed With: patient  Overall Patient Progress: no change  Goal: Patient-Specific Goal (Individualized)  Description: You can add care plan individualizations to a care plan. Examples of Individualization might be:  \"Parent requests to be called daily at 9am for status\", \"I have a hard time hearing out of my right ear\", or \"Do not touch me to wake me up as it startles me\".  Outcome: Ongoing, Progressing  Goal: Absence of Hospital-Acquired Illness or Injury  Outcome: Ongoing, Progressing  Intervention: Identify and Manage Fall Risk  Recent Flowsheet Documentation  Taken 9/24/2022 0600 by Silvina Villanueva RN  Safety Promotion/Fall Prevention: safety round/check completed  Intervention: Prevent and Manage VTE (Venous Thromboembolism) Risk  Recent Flowsheet Documentation  Taken 9/24/2022 0600 by Silvina Villanueva RN  VTE Prevention/Management: SCDs (sequential compression devices) off  Activity Management: " activity adjusted per tolerance  Intervention: Prevent Infection  Recent Flowsheet Documentation  Taken 9/24/2022 0600 by Silvina Villanueva, RN  Infection Prevention:   rest/sleep promoted   single patient room provided  Goal: Optimal Comfort and Wellbeing  Outcome: Ongoing, Progressing  Goal: Readiness for Transition of Care  Outcome: Ongoing, Progressing   Goal Outcome Evaluation:    Plan of Care Reviewed With: patient     Overall Patient Progress: no change

## 2022-09-24 NOTE — PLAN OF CARE
"Goal Outcome Evaluation:    Plan of Care Reviewed With: patient     Overall Patient Progress: improving    Temp 100.8, all other vss. Lungs diminished in lower lobes, oxygen weaned to RA and sats at 93%. Dyspnea upon exertion is present but reports improvement. Incontinent of urine, purewick in place.Pt up with 2 assist and walker to recliner for meals, denies any pain, will continue to ,monitor  Problem: Plan of Care - These are the overarching goals to be used throughout the patient stay.    Goal: Patient-Specific Goal (Individualized)    9/24/2022 1152 by Regina Jones RN  Flowsheets (Taken 9/24/2022 1152)  Individualized Care Needs: wears glasses  Patient-Specific Goals (Include Timeframe): monitor ox  Problem: Plan of Care - These are the overarching goals to be used throughout the patient stay.    Goal: Absence of Hospital-Acquired Illness or Injury  Intervention: Prevent Skin Injury  Recent Flowsheet Documentation  Taken 9/24/2022 1431 by Regina Jones RN  Skin Protection:    adhesive use limited    incontinence pads utilized    tubing/devices free from skin contact  Taken 9/24/2022 1430 by Regina Jones RN  Body Position:    turned    left    side-lying  Taken 9/24/2022 1140 by Regina Jones RN  Body Position:    right    side-lying  Taken 9/24/2022 1000 by Regina Jones RN  Skin Protection:    adhesive use limited    incontinence pads utilized    tubing/devices free from skin contact   ygen and attempt wean this shift     /61   Pulse 72   Temp 99.4  F (37.4  C) (Tympanic)   Resp 22   Ht 1.575 m (5' 2\")   Wt 90.5 kg (199 lb 9.6 oz)   SpO2 93%   BMI 36.51 kg/m           "

## 2022-09-24 NOTE — PROGRESS NOTES
SAFETY CHECKLIST  ID Bands and Risk clasps correct and in place (DNR, Fall risk, Allergy, Latex, Limb):  Yes  All Lines Reconciled and labeled correctly: Yes  Whiteboard updated:Yes  Environmental interventions (bed/chair alarm on, call light, side rails, restraints, sitter....): Yes  Verify Tele #: [id number]

## 2022-09-24 NOTE — PROGRESS NOTES
09/24/22 1200   Signing Clinician's Name / Credentials   Signing clinician's name / credentials Ying Ferrell OTR/L   Quick Adds   Rehab Discipline OT   Functional Transfer Training   Minutes of Treatment 25   Symptoms Noted During/After Treatment fatigue   Treatment Detail Min-Mod A x 1-2 for stand pivot tranfser with a couple of steps from EOB to bedside recliner using FWW.   Therapeutic Activity   Symptoms Noted During/After Treatment Fatigue   Treatment Detail Mod A x 1-2 for supine to sitting at EOB using bed rail.   Grooming Training   Sisters Level (Grooming Training) stand-by assist   Assistance (Grooming Training) set-up required   OT Discharge Planning   OT Discharge Recommendation (DC Rec) Transitional Care Facility   OT Rationale for DC Rec pt would benefit from on-going therapies at [facility]   OT Brief overview of current status Pt completed bed mobility for supine to sitting at EOB.  Completed stand pivot transfer with several short steps to transfer from EOB to bedside recliner with Min A of 2 for safety and FWW.  Able to complete hair brushing with set-up.   Additional Documentation   OT Plan continue OT   Total Session Time   Total Session Time (minutes) 25 minutes

## 2022-09-24 NOTE — PROGRESS NOTES
09/24/22 1400   Signing Clinician's Name / Credentials   Signing clinician's name / credentials Rosalba Byers DPT   Quick Adds   Rehab Discipline PT   Quick Adds Interventions Therapeutic Activity   Functional Transfer Training   Symptoms Noted During/After Treatment fatigue;shortness of breath   Treatment Detail Min A x2 for bed mobility and sit to stand, pivot transfer to reclining chair using small shuffling steps w/ FWW and CGA. Difficulty weight shifting during transfer and needed directional cues for sitting down.   Therapeutic Activity   Symptoms Noted During/After Treatment Fatigue   PT Discharge Planning   PT Discharge Recommendation (DC Rec) Transitional Care Facility   PT Rationale for DC Rec pt has below baseline levels of function. Unable to be mobile without assist including bed mobility and transferring.   PT Brief overview of current status Min A x2 for bed mobility and sit to stand, pivot transfer to reclining chair using small shuffling steps w/ FWW and CGA. Difficulty weight shifting during transfer and needed directional cues for sitting down.   Additional Documentation   Rehab Comments improved mobility today, able to take small shuffling steps during transfer w/ FWW   PT Plan Continue PT

## 2022-09-25 NOTE — PROGRESS NOTES
[facility]    Medicine Progress Note - Hospitalist Service    Date of Admission:  9/21/2022    Assessment & Plan          Niru Durham is a 89 year old female admitted on 9/21/2022. She presents with a fall.     Principal Problem:    NSTEMI (non-ST elevated myocardial infarction) (H)  Assessment: Present on admission.  Troponin significantly elevated at 6900, increasing with follow-up to 7200, then trending down.  Patient does not have any chest pain or shortness of breath. echocardiogram shows wall motion abnormalities that could be either LAD distribution or stress cardiomyopathy.  It is the 1 month anniversary of her son's death and she is quite tearful, stress cardiomyopathy could certainly be playing a role.  However, given her history of coronary artery disease in the past we cannot ignore the idea of this being a non-ST elevation MI. CXR stable.   Plan:   Continue metoprolol XL  Start lisinopril 2.5 mg daily  High intensity atorvastatin 80 mg daily  Dual antiplatelet therapy with baby aspirin and Brilinta. She got 48 hour of lovenox  Plan for discharge to SNF.  Plan outpatient cardiology follow-up    Active Problems:    Coronary atherosclerosis of native coronary artery  Assessment: 2014 non-ST elevation MI with JANE to LAD and RCA      Hyperlipidemia  Assessment: chronic  Plan: change to atorvastatin from pravachol      Hypertension  Assessment: chronic  Plan: monitor      Urinary tract infection-E coli.   Assessment: present on admission   Plan: empiric ceftriaxone     Acute on chronic systolic heart failure with acute hypoxic respiratory failure. POA. Improved.   Assessment: EF in 2014 55%, now 30-35% with wall motion abnormalities   Plan: lisinopril, Toprol, monitor volume status       Diet: Regular Diet Adult    DVT Prophylaxis: SCD  Scott Catheter: Not present  Central Lines: None  Cardiac Monitoring: None  Code Status: No CPR- Do NOT Intubate      Disposition Plan    SNF     "  The patient's care was discussed with the Patient.    Yoselyn Zuñiga DO  Hospitalist Service  [facility]  Securely message with the [employer] Web Console (learn more here)  Text page via [employer] Paging/Directory         Clinically Significant Risk Factors Present on Admission               # Obesity: Estimated body mass index is 37.11 kg/m  as calculated from the following:    Height as of this encounter: 1.575 m (5' 2\").    Weight as of this encounter: 92 kg (202 lb 14.4 oz).        __________________________________________________________________    Interval History   Patient doing well today.  No chest pain or shortness of breath.  In fact, feels like her breathing has improved.  No nausea or vomiting.  Eating well.      Data reviewed today: I reviewed all medications, new labs and imaging results over the last 24 hours. I personally reviewed no images or EKG's today.    Physical Exam   Vital Signs: Temp: 98.5  F (36.9  C) Temp src: Tympanic BP: 119/81 Pulse: 69   Resp: 20 SpO2: 97 % O2 Device: Nasal cannula Oxygen Delivery: 2 LPM  Weight: 202 lbs 14.4 oz  General Appearance: Awake alert and oriented.  No acute distress.    Respiratory: Clear to auscultation bilaterally.  No wheezing rhonchi or rales  Cardiovascular: Regular rate.  No murmur. +fine expiratory wheezing. mild bilateral nonpitting edema  GI: Abdomen soft, nontender, nondistended  Skin: Warm and dry  Other: Appropriate affect and memory recall    Data   Recent Labs   Lab 09/25/22  0835 09/23/22  0551 09/22/22  0734   WBC 7.3 7.9 8.3   HGB 12.4 11.7 12.5   MCV 89 90 89    238 269    138 137   POTASSIUM 4.3 3.9 3.9   CHLORIDE 101 102 100   CO2 31 29 30   BUN 16 21 19   CR 0.66 0.78 0.80   ANIONGAP 7 7 7   ОЛЬГА 9.7 9.3 9.6   * 113* 147*     Recent Results (from the past 24 hour(s))   XR Chest 2 Views    Narrative    PROCEDURE:  XR CHEST 2 VIEWS    HISTORY:  recent MI. ro pna vs atelectasis. fever..     COMPARISON: "  9/21/2022    FINDINGS:   The cardiac silhouette is normal in size. The pulmonary vasculature is  normal.  The lungs are clear. No pleural effusion or pneumothorax.      Impression    IMPRESSION:  No acute cardiopulmonary disease.      WILMER MEAD MD         SYSTEM ID:  [id number]

## 2022-09-25 NOTE — PLAN OF CARE
"Goal Outcome Evaluation:    Plan of Care Reviewed With: patient     Overall Patient Progress: no change         Afebrile, vss. Pt denies pain. Incontinent at times, purewick in place, will continue to monitor.    BP (!) 141/82   Pulse 71   Temp 99.1  F (37.3  C) (Tympanic)   Resp 18   Ht 1.575 m (5' 2\")   Wt 92 kg (202 lb 14.4 oz)   SpO2 93%   BMI 37.11 kg/m      Problem: Plan of Care - These are the overarching goals to be used throughout the patient stay.    Goal: Absence of Hospital-Acquired Illness or Injury  Intervention: Identify and Manage Fall Risk  Recent Flowsheet Documentation  Taken 9/25/2022 1654 by Regina Jones RN  Safety Promotion/Fall Prevention:   safety round/check completed   clutter free environment maintained   fall prevention program maintained   bed alarm on     Problem: Fall Injury Risk  Goal: Absence of Fall and Fall-Related Injury  Intervention: Promote Injury-Free Environment  Recent Flowsheet Documentation  Taken 9/25/2022 1654 by Regina Jones, RN  Safety Promotion/Fall Prevention:   safety round/check completed   clutter free environment maintained   fall prevention program maintained   bed alarm on     "

## 2022-09-25 NOTE — PROGRESS NOTES
SAFETY CHECKLIST  ID Bands and Risk clasps correct and in place (DNR, Fall risk, Allergy, Latex, Limb):  Yes  All Lines Reconciled and labeled correctly: Yes  Whiteboard updated:Yes  Environmental interventions (bed/chair alarm on, call light, side rails, restraints, sitter....): Yes   Assumed care at 1500

## 2022-09-25 NOTE — PLAN OF CARE
"Patient was up in chair for meals. Vital signs stable. She denies pain. Was weaned to room air, sa02 in low 90's. Appetite good at meals.  Problem: Plan of Care - These are the overarching goals to be used throughout the patient stay.    Goal: Plan of Care Review/Shift Note  Description: The Plan of Care Review/Shift note should be completed every shift.  The Outcome Evaluation is a brief statement about your assessment that the patient is improving, declining, or no change.  This information will be displayed automatically on your shift note.  Outcome: Ongoing, Progressing  Flowsheets (Taken 9/25/2022 0814)  Plan of Care Reviewed With: patient  Overall Patient Progress: no change  Goal: Patient-Specific Goal (Individualized)  Description: You can add care plan individualizations to a care plan. Examples of Individualization might be:  \"Parent requests to be called daily at 9am for status\", \"I have a hard time hearing out of my right ear\", or \"Do not touch me to wake me up as it startles me\".  Outcome: Ongoing, Progressing  Goal: Absence of Hospital-Acquired Illness or Injury  Outcome: Ongoing, Progressing  Intervention: Identify and Manage Fall Risk  Recent Flowsheet Documentation  Taken 9/25/2022 0745 by Yolanda Telles, RN  Safety Promotion/Fall Prevention: safety round/check completed  Intervention: Prevent and Manage VTE (Venous Thromboembolism) Risk  Recent Flowsheet Documentation  Taken 9/25/2022 0745 by Yolanda Telles, RN  VTE Prevention/Management: patient refused intervention  Activity Management: activity adjusted per tolerance  Intervention: Prevent Infection  Recent Flowsheet Documentation  Taken 9/25/2022 0745 by Yolanda Telles, RN  Infection Prevention:   rest/sleep promoted   single patient room provided  Goal: Optimal Comfort and Wellbeing  Outcome: Ongoing, Progressing  Goal: Readiness for Transition of Care  Outcome: Ongoing, Progressing   Goal Outcome Evaluation:    Plan of Care " Reviewed With: patient     Overall Patient Progress: no change

## 2022-09-25 NOTE — PROGRESS NOTES
09/25/22 1600   Signing Clinician's Name / Credentials   Signing clinician's name / credentials Rosalba Byers DPT   Quick Adds   Rehab Discipline PT   Quick Adds Interventions Therapeutic Activity   Functional Transfer Training   Symptoms Noted During/After Treatment fatigue   Treatment Detail Mod A x1 for bed mobility for upper body, pt managed legs I. Sit to stand w/ min A and used FWW to take steps forward and backward for total of 25 steps. Voices fear of falling despite reassurance and walker use. Returned to bed and Max A for boosting.   Therapeutic Activity   Symptoms Noted During/After Treatment Fatigue   PT Discharge Planning   PT Discharge Recommendation (DC Rec) Transitional Care Facility   PT Rationale for DC Rec pt has below baseline levels of function. Unable to be mobile without assist including bed mobility and transferring.   PT Brief overview of current status Mod A for bed mobility and Min A x1 sit to stand, takes a total of 25 steps forward and backward at bedside w/ FWW and CGA.   Additional Documentation   Rehab Comments improved mobility today, able to take 25 small shuffling steps at bedside.   PT Plan Continue PT   Total Session Time   Total Session Time (minutes) 15 minutes

## 2022-09-25 NOTE — PLAN OF CARE
"Pt has been complaining about not sleeping at the hospital.  PRN trazodone ordered which was minimally effective.  Pt sating at 89% on RA.  Placed on 2L of O2 which increased oxygen saturation to 96%  Pt mentions right sided hip pain in conversation but when asked directly about pain she denies current pain.  Tace edema in lower extremities.  C/o constipation and requested Milk of magnesia.  Pt decided she will take in the morning.  /74 (BP Location: Left arm, Patient Position: Semi-Egan's)   Pulse 69   Temp 98.2  F (36.8  C) (Tympanic)   Resp 18   Ht 1.575 m (5' 2\")   Wt 92 kg (202 lb 14.4 oz)   SpO2 96%   BMI 37.11 kg/m       Problem: Plan of Care - These are the overarching goals to be used throughout the patient stay.    Goal: Plan of Care Review/Shift Note  Description: The Plan of Care Review/Shift note should be completed every shift.  The Outcome Evaluation is a brief statement about your assessment that the patient is improving, declining, or no change.  This information will be displayed automatically on your shift note.  Outcome: Ongoing, Progressing  Flowsheets (Taken 9/25/2022 0441)  Plan of Care Reviewed With: patient  Outcome Evaluation: Pt is having difficulty sleeping in this environment  Overall Patient Progress: no change  Goal: Patient-Specific Goal (Individualized)  Description: You can add care plan individualizations to a care plan. Examples of Individualization might be:  \"Parent requests to be called daily at 9am for status\", \"I have a hard time hearing out of my right ear\", or \"Do not touch me to wake me up as it startles me\".  Outcome: Ongoing, Progressing  Goal: Absence of Hospital-Acquired Illness or Injury  Outcome: Ongoing, Progressing  Intervention: Prevent and Manage VTE (Venous Thromboembolism) Risk  Recent Flowsheet Documentation  Taken 9/24/2022 1945 by Silvina Villanueva, RN  Activity Management: activity adjusted per tolerance  Intervention: Prevent " Infection  Recent Flowsheet Documentation  Taken 9/25/2022 0400 by Silvina Villanueva, RN  Infection Prevention:   rest/sleep promoted   single patient room provided  Goal: Optimal Comfort and Wellbeing  Outcome: Ongoing, Progressing  Goal: Readiness for Transition of Care  Outcome: Ongoing, Progressing   Goal Outcome Evaluation:    Plan of Care Reviewed With: patient     Overall Patient Progress: no change    Outcome Evaluation: Pt is having difficulty sleeping in this environment

## 2022-09-25 NOTE — PHARMACY
Pharmacy- Automatic Medication Dose Adjustment     Patient Active Problem List   Diagnosis     Bladder instability     Chronic right hip pain     Cognitive impairment     Colonoscopy refused     Coronary atherosclerosis of native coronary artery     Full dentures     Goiter     Gout     Hyperlipidemia     Hypertension     Kyphosis (acquired) (postural)     Lactose intolerance     Nonmelanoma skin cancer     Onychocryptosis     Onychomycosis     Osteopenia     Other urinary incontinence     Urinary tract infection     Unstable angina (H)     Sepsis (H)     NSTEMI (non-ST elevated myocardial infarction) (H)        Relevant Labs:  Recent Labs   Lab Test 09/25/22  0835 09/23/22  0551   WBC 7.3 7.9   HGB 12.4 11.7    238      Weight: 92 kg      Intake/Output Summary (Last 24 hours) at 9/25/2022 1356  Last data filed at 9/25/2022 1000  Gross per 24 hour   Intake 615 ml   Output 1450 ml   Net -835 ml          Per Automatic Medication Dose Adjustment Policy, will adjust ceftriaxone to 2 g IVPB every 24 hours.    Will continue to follow and make adjustments accordingly. Thank You.    Moira Jama, [facility] ....................  9/25/2022   1:56 PM

## 2022-09-26 NOTE — PLAN OF CARE
"Pt is still having trouble sleeping.  Gave trazodone 50 mg x2 PRN for sleep which was ineffective.  Pt also constipated.  No BM since 9/20.  Low grade fever.    /77 (BP Location: Left arm, Patient Position: Semi-Egan's)   Pulse 72   Temp 99.7  F (37.6  C) (Tympanic)   Resp 18   Ht 1.575 m (5' 2\")   Wt 90.1 kg (198 lb 9.6 oz)   SpO2 92%   BMI 36.32 kg/m        Problem: Plan of Care - These are the overarching goals to be used throughout the patient stay.    Goal: Plan of Care Review/Shift Note  Description: The Plan of Care Review/Shift note should be completed every shift.  The Outcome Evaluation is a brief statement about your assessment that the patient is improving, declining, or no change.  This information will be displayed automatically on your shift note.  Outcome: Ongoing, Progressing  Flowsheets (Taken 9/26/2022 0526)  Plan of Care Reviewed With: patient  Overall Patient Progress: no change  Goal: Patient-Specific Goal (Individualized)  Description: You can add care plan individualizations to a care plan. Examples of Individualization might be:  \"Parent requests to be called daily at 9am for status\", \"I have a hard time hearing out of my right ear\", or \"Do not touch me to wake me up as it startles me\".  Outcome: Ongoing, Progressing  Goal: Absence of Hospital-Acquired Illness or Injury  Outcome: Ongoing, Progressing  Intervention: Identify and Manage Fall Risk  Recent Flowsheet Documentation  Taken 9/26/2022 0500 by Silvina Villanueva RN  Safety Promotion/Fall Prevention:   activity supervised   safety round/check completed   supervised activity  Intervention: Prevent and Manage VTE (Venous Thromboembolism) Risk  Recent Flowsheet Documentation  Taken 9/26/2022 0500 by Silvina Villanueva RN  VTE Prevention/Management: SCDs (sequential compression devices) off  Activity Management: activity adjusted per tolerance  Taken 9/25/2022 2100 by Silvina Villanueva RN  VTE Prevention/Management: SCDs " Please notify patient with results. + Feldman's. Neg dysplasia. PPI . Schedule repeat EGD in 1 year. Neg Colonoscopy. Repeat Colonoscopy in 5 years with family h/o colon cancer (sequential compression devices) off  Activity Management: activity adjusted per tolerance  Intervention: Prevent Infection  Recent Flowsheet Documentation  Taken 9/26/2022 0500 by Silvina Villanueva, RN  Infection Prevention:   rest/sleep promoted   single patient room provided  Taken 9/25/2022 2100 by Silvina Villanueva, RN  Infection Prevention:   rest/sleep promoted   single patient room provided  Goal: Optimal Comfort and Wellbeing  Outcome: Ongoing, Progressing  Goal: Readiness for Transition of Care  Outcome: Ongoing, Progressing   Goal Outcome Evaluation:    Plan of Care Reviewed With: patient     Overall Patient Progress: no change

## 2022-09-26 NOTE — PROGRESS NOTES
09/26/22 1433   Signing Clinician's Name / Credentials   Signing clinician's name / credentials Shelbie Mueller OTR/L   Quick Adds   Rehab Discipline OT   Functional Transfer Training   Symptoms Noted During/After Treatment fatigue   Treatment Detail pt moved OOB with min to mod assist of 1   Gait Training   Symptoms Noted During/After Treatment (Gait Training) fatigue   Treatment Detail needed cues to take bigger steps, moving slow   [place] Level (Gait Training) minimum assist (75% patient effort)   Physical Assistance Level (Gait Training) 1 person assist;verbal cues   Assistive Device (Gait Training) rolling walker   Grooming Training   [place] Level (Grooming Training) stand-by assist   Assistance (Grooming Training) supervision   Treatment Detail while seated   Bathing Training   [place] Level (Bathing Training) stand-by assist   Assistance (Bathing Training) supervision;set-up required;verbal cues   Treatment Detail max assist to wash charleen area in standing   OT Discharge Planning   OT Discharge Recommendation (DC Rec) Transitional Care Facility   OT Rationale for DC Rec Pt needs on-going therapy to maximize level of independence needed to return home alone as previous   OT Brief overview of current status Pt very pleasant, motivated to get up out of bed and to chair, ambulated from one sie of the bed to recliner with Min A using FWW, slowly and cues to take big steps, pt completed light U/B bathing and grooming while seated with set up, max assist with L/B cares   Additional Documentation   OT Plan cont OT   Total Session Time   Total Session Time (minutes) 45 minutes

## 2022-09-26 NOTE — PLAN OF CARE
"Patient is pleasant. Vitals are stable. Patient was up for walk with PT/OT. Patient stated that she would like to get up a few times today. Plan is to use the bathroom or bedside commode instead of the purewick. No new concerns at this time. Will continue to monitor.       Problem: Plan of Care - These are the overarching goals to be used throughout the patient stay.    Goal: Plan of Care Review/Shift Note  Description: The Plan of Care Review/Shift note should be completed every shift.  The Outcome Evaluation is a brief statement about your assessment that the patient is improving, declining, or no change.  This information will be displayed automatically on your shift note.  Outcome: Ongoing, Progressing  Flowsheets (Taken 9/26/2022 1223)  Plan of Care Reviewed With: patient  Overall Patient Progress: improving  Goal: Patient-Specific Goal (Individualized)  Description: You can add care plan individualizations to a care plan. Examples of Individualization might be:  \"Parent requests to be called daily at 9am for status\", \"I have a hard time hearing out of my right ear\", or \"Do not touch me to wake me up as it startles me\".  Outcome: Ongoing, Progressing  Goal: Absence of Hospital-Acquired Illness or Injury  Outcome: Ongoing, Progressing  Intervention: Identify and Manage Fall Risk  Recent Flowsheet Documentation  Taken 9/26/2022 0731 by Manny Bergeron RN  Safety Promotion/Fall Prevention: safety round/check completed  Intervention: Prevent and Manage VTE (Venous Thromboembolism) Risk  Recent Flowsheet Documentation  Taken 9/26/2022 0731 by Manny Bergeron RN  VTE Prevention/Management: SCDs (sequential compression devices) off  Intervention: Prevent Infection  Recent Flowsheet Documentation  Taken 9/26/2022 0731 by Manny Bergeron RN  Infection Prevention:   rest/sleep promoted   single patient room provided  Goal: Optimal Comfort and Wellbeing  Outcome: Ongoing, Progressing  Goal: Readiness for " Transition of Care  Outcome: Ongoing, Progressing         Goal Outcome Evaluation:    Plan of Care Reviewed With: patient     Overall Patient Progress: improving

## 2022-09-26 NOTE — PROGRESS NOTES
:     Spoke with Smita from [facility]. Patient is currently being screen. [facility] has not received prior authorization from patients insurance at this time.      will continue to follow for discharge planning needs.    JAYLEEN Cooper on 9/26/2022 at 9:03 AM

## 2022-09-26 NOTE — PROGRESS NOTES
[facility]    Medicine Progress Note - Hospitalist Service    Date of Admission:  9/21/2022    Assessment & Plan                     Niru Durham is a 89 year old female admitted on 9/21/2022. She presented with a fall.     Principal Problem:    NSTEMI (non-ST elevated myocardial infarction) (H)  Assessment: Present on admission.  Troponin significantly elevated at 6900, increasing with follow-up to 7200, then trending down.  Patient did not have any chest pain or shortness of breath. echocardiogram shows wall motion abnormalities that could be either LAD distribution or stress cardiomyopathy.  It is the 1 month anniversary of her son's death and she is quite tearful, stress cardiomyopathy could certainly be playing a role.  However, given her history of coronary artery disease in the past we cannot ignore the idea of this being a non-ST elevation MI. CXR stable.     Patient continues to be pain-free and feels that she has less exercise tolerance and feels unsteady with walking due to weakness but otherwise is at baseline health status    Plan:   Continue metoprolol XL  Continue lisinopril 2.5 mg daily  High intensity atorvastatin 80 mg daily  Dual antiplatelet therapy with baby aspirin and Brilinta. She got 48 hour of lovenox  Plan for discharge to SNF.  Plan outpatient cardiology follow-up    Active Problems:    Coronary atherosclerosis of native coronary artery  Assessment: 2014 non-ST elevation MI with JANE to LAD and RCA      Hyperlipidemia  Assessment: chronic  Plan: change to atorvastatin from pravachol      Hypertension  Assessment: chronic  Plan: monitor      Urinary tract infection-E coli.   Assessment: present on admission   Plan: empiric ceftriaxone     Acute on chronic systolic heart failure with acute hypoxic respiratory failure. POA. Improved.   Assessment: EF in 2014 55%, now 30-35% with wall motion abnormalities   Plan: lisinopril, Toprol, monitor volume status          Diet: Regular Diet Adult    DVT Prophylaxis: Pneumatic Compression Devices  Scott Catheter: Not present  Central Lines: None  Cardiac Monitoring: None  Code Status: No CPR- Do NOT Intubate      Disposition Plan    discharged [facility]     The patient's care was discussed with the Patient and Patient's Family.    Damon Ruffin MD  Hospitalist Service  [facility]  Securely message with the Vocera Web Console (learn more here)  Text page via [employer] Paging/Directory         Clinically Significant Risk Factors Present on Admission                      ______________________________________________________________________    Interval History   Patient tells me that she feels okay.  She was up to the bathroom and did not have any chest pain or shortness of breath.  She tells me that she is eating okay.  Really feels that she is at her baseline functional status with the exception of less exercise tolerance primarily due to leg weakness.  She does feel like she is slowly improving.    Data reviewed today: I reviewed all medications, new labs and imaging results over the last 24 hours. I personally reviewed the chest x-ray image(s) showing No pneumonia.    Physical Exam   Vital Signs: Temp: 98.4  F (36.9  C) Temp src: Tympanic BP: 136/69 Pulse: 75   Resp: 16 SpO2: 91 % O2 Device: None (Room air)    Weight: 198 lbs 9.6 oz  Constitutional: awake, alert, cooperative, no apparent distress, and appears stated age  Respiratory: Clear to auscultation bilaterally  Cardiovascular: Regular rate and rhythm.  No murmurs rubs or gallops.  Trace bilateral pedal edema  Musculoskeletal: Generalized weakness with no focal deficits  Neuropsychiatric: Alert and oriented x3    Data   Recent Labs   Lab 09/25/22  0835 09/23/22  0551 09/22/22  0734   WBC 7.3 7.9 8.3   HGB 12.4 11.7 12.5   MCV 89 90 89    238 269    138 137   POTASSIUM 4.3 3.9 3.9   CHLORIDE 101  102 100   CO2 31 29 30   BUN 16 21 19   CR 0.66 0.78 0.80   ANIONGAP 7 7 7   ОЛЬГА 9.7 9.3 9.6   * 113* 147*     No results found for this or any previous visit (from the past 24 hour(s)).

## 2022-09-26 NOTE — PROGRESS NOTES
Verbal order received from Dr. Ruffin to start Miralax once daily and colace twice daily for constipation.    Omaira Martinez RN on 9/26/2022 at 12:41 PM

## 2022-09-26 NOTE — PROGRESS NOTES
09/26/22 1200   Signing Clinician's Name / Credentials   Signing clinician's name / credentials Josemanuel Braun MPT   Quick Adds   Rehab Discipline PT   Quick Adds Interventions Mobility   Functional Transfer Training   Symptoms Noted During/After Treatment fatigue   Treatment Detail minimal assist of 1 for sit to stand, pivot transfer and short amlbulation using a Fww   Gait Training   Symptoms Noted During/After Treatment (Gait Training) fatigue;other (see comments)  (anxiety with falling)   Distance in Feet (Required for LE Total Joints) 15   Treatment Detail short ambulation within patient's room   [place] Level (Gait Training) minimum assist (75% patient effort)   Weight Bearing (Gait Training) full weight-bearing   Assistive Device (Gait Training) rolling walker   Pattern Analysis (Gait Training) 3-point gait   Gait Analysis Deviations decreased spencer;increased time in double stance;decreased velocity of limb motion;decreased step length   Impairments (Gait Analysis/Training) balance impaired;strength decreased   Therapeutic Activity   Treatment Detail minimal assist to bed mobilities   PT Discharge Planning   PT Discharge Recommendation (DC Rec) Transitional Care Facility   PT Rationale for DC Rec to promote strength, stability and safe mobility   PT Brief overview of current status minimal assist for all mobilites using a Fww for gait activities   Additional Documentation   Rehab Comments pleasant patient will benefit from continued PT in short term rehab   PT Plan Continue PT   Total Session Time   Total Session Time (minutes) 40 minutes

## 2022-09-27 NOTE — PHARMACY - DISCHARGE MEDICATION RECONCILIATION AND EDUCATION
[facility]  Part of [address] [address]    September 27, 2022    Dear Pharmacist,    Your customer, Niru Durham, born on 11/4/1931, was recently discharged from [facility].  We have updated her medication list and want to alert you to the following:       Review of your medicines      START taking      Dose / Directions   atorvastatin 80 MG tablet  Commonly known as: LIPITOR  Used for: NSTEMI (non-ST elevated myocardial infarction) (H)      Dose: 80 mg  Take 1 tablet (80 mg) by mouth daily  Quantity: 30 tablet  Refills: 0     docusate sodium 100 MG capsule  Commonly known as: COLACE  Used for: NSTEMI (non-ST elevated myocardial infarction) (H)      Dose: 100 mg  Take 1 capsule (100 mg) by mouth 2 times daily  Quantity: 60 capsule  Refills: 0     furosemide 20 MG tablet  Commonly known as: LASIX  Used for: NSTEMI (non-ST elevated myocardial infarction) (H)      Dose: 20 mg  Start taking on: September 28, 2022  Take 1 tablet (20 mg) by mouth daily  Quantity: 30 tablet  Refills: 0     lisinopril 2.5 MG tablet  Commonly known as: ZESTRIL  Used for: NSTEMI (non-ST elevated myocardial infarction) (H)      Dose: 2.5 mg  Start taking on: September 28, 2022  Take 1 tablet (2.5 mg) by mouth daily  Quantity: 30 tablet  Refills: 0     ticagrelor 90 MG tablet  Commonly known as: BRILINTA  Used for: NSTEMI (non-ST elevated myocardial infarction) (H)      Dose: 90 mg  Take 1 tablet (90 mg) by mouth 2 times daily  Quantity: 60 tablet  Refills: 0        CONTINUE these medicines which have NOT CHANGED      Dose / Directions   acetaminophen 500 MG tablet  Commonly known as: TYLENOL      Dose: 1,000 mg  Take 1,000 mg by mouth every 8 hours as needed for pain  Refills: 0     aspirin 81 MG EC tablet      Dose: 81 mg  Take 81 mg by mouth daily  Refills: 0     Cranberry 1000 MG Caps      Dose: 1 tablet  Take 1 tablet by mouth daily (with  lunch)  Refills: 0     metoprolol succinate ER 50 MG 24 hr tablet  Commonly known as: TOPROL XL      Dose: 50 mg  Take 50 mg by mouth daily  Refills: 0     nitroGLYcerin 0.4 MG sublingual tablet  Commonly known as: NITROSTAT      Dose: 0.4 mg  Place 0.4 mg under the tongue every 5 minutes as needed for chest pain  Refills: 0     oxybutynin ER 5 MG 24 hr tablet  Commonly known as: DITROPAN XL      Dose: 5 mg  Take 5 mg by mouth 2 times daily  Refills: 0     potassium 99 MG Tabs      Dose: 1 tablet  Take 1 tablet by mouth daily (with lunch)  Refills: 0     vitamin C 1000 MG Tabs  Commonly known as: ASCORBIC ACID      Dose: 1,000 mg  Take 1,000 mg by mouth daily  Refills: 0        STOP taking    pravastatin 20 MG tablet  Commonly known as: PRAVACHOL              Where to get your medicines      These medications were sent to [id number] - [place], [place] - [address]  [address] [facility] [address]    Phone: [phone]     atorvastatin 80 MG tablet    docusate sodium 100 MG capsule    furosemide 20 MG tablet    lisinopril 2.5 MG tablet    ticagrelor 90 MG tablet         We also reviewed Niru Durham's allergy list and updated it as needed:  Allergies: Codeine and Naproxen    Thank you for continuing to care for Niru Durham.  We look forward to working together with you in the future.    Sincerely,  Moira Jama, [facility]

## 2022-09-27 NOTE — PLAN OF CARE
Physical Therapy Discharge Summary    Reason for therapy discharge:    Discharged to transitional care facility.    Progress towards therapy goal(s). See goals on Care Plan in [facility] electronic health record for goal details.  Goals partially met.  Barriers to achieving goals:   discharge from facility.    Therapy recommendation(s):    Continued therapy is recommended.  Rationale/Recommendations:  to promote strength, stability and safe mobility prior to returning home.    Goal Outcome Evaluation:

## 2022-09-27 NOTE — PLAN OF CARE
Alert and oriented, VSS. Temp of 100.1. Patient requested tylenol before bed to help her sleep. Trazodone also given before bed. Lungs clear and dim bilaterally. Bowel sounds active. Incontinent of urine, purewick in place during the night, adequate urine output. Appeared to sleep most of the night.     Berta Vee RN on 9/27/2022 at 3:46 AM

## 2022-09-27 NOTE — PLAN OF CARE
Patient is pleasant. Vitals are stable. Patient has been afebrile. Patient is up with assist of 2. Plan is to discharge to [facility] once patient has a bowel movement.     1300 Patient had a bowel movement after receiving colace, senna, doculax, and miralax.       Goal Outcome Evaluation:    Plan of Care Reviewed With: patient     Overall Patient Progress: improving

## 2022-09-27 NOTE — DISCHARGE SUMMARY
"[facility]  Hospitalist Discharge Summary      Date of Admission:  9/21/2022  Date of Discharge:  9/27/2022  Discharging Provider: Damon Ruffin MD  Discharge Service: Hospitalist Service    Discharge Diagnoses   NSTEMI  UTI    Follow-ups Needed After Discharge   Follow-up Appointments     Follow Up and recommended labs and tests      Follow up with Nursing home physician.  I would suggest rechecking a BMP.  Follow-up with cardiology.             Unresulted Labs Ordered in the Past 30 Days of this Admission     No orders found from 8/22/2022 to 9/22/2022.      These results will be followed up by NA    Discharge Disposition   Discharged to nursing home  Condition at discharge: Stable      Hospital Course          Please see Dr. Upton's H&P.         \"Niru Durham is a 89 year old female who presents to the emergency department after falling twice in 24 hours.  She lives independently at her home, and yesterday got her feet tangled up in her pajamas and fell on the ground.  She did not injure herself or hit her head.  This morning her legs felt \"dead\" and she landed on her backside.  She did not have a syncopal spell.  She did not have vertigo.  She is able to move her left and right side symmetrically.  Because of these 2 falls she came to our emergency department for evaluation.  Part of her work-up today shows a troponin of 6915.4.  EKG shows T wave inversions in the anterior and lateral leads that was not there in 2014.     She denies any shortness of breath.  She denies any chest pain.  No nausea or vomiting however she is burping frequently.  Her family relates that that was a similar symptom to her 2014 non-ST elevation MI.  She received drug-eluting stents to the LAD and right coronary artery at that time.     The patient was admitted for further management.    Principal Problem:    NSTEMI (non-ST elevated myocardial infarction) (H)  Assessment: Present on admission.  Troponin " "significantly elevated at 6900, increasing with follow-up to 7200.  Patient does not have any chest pain or shortness of breath, but does have frequent belching.  Per her family these are similar symptoms to when she had a non-ST elevation MI in 2014.  In the emergency department, echocardiogram shows wall motion abnormalities that could be either LAD distribution or stress cardiomyopathy.  Today is the 1 month anniversary of her son's death and she is quite tearful, stress cardiomyopathy could certainly be playing a role.  However, given her history of coronary artery disease in the past we cannot ignore the idea of this being a non-ST elevation MI.  I discussed with the patient and her daughter that we do not have ability to transfer her to a Cath Lab, as all hospitals in the region are not excepting transfers.  They understand this and are in agreement for medical management at this time.  We discussed CODE STATUS, and the patient has elected to be DO NOT RESUSCITATE.  Plan: Admit to the hospital  Telemetry  Continue metoprolol XL  Start lisinopril 2.5 mg daily  High intensity atorvastatin 80 mg daily  Dual antiplatelet therapy with baby aspirin and Brilinta  48 hours of Lovenox  Follow serial troponins  Plan outpatient cardiology follow-up\"    Patient's troponin trended down after initial rise.  She did not have any chest pain or shortness of breath during her hospitalization.  Echocardiogram showed wall motion abnormalities that could be either LAD distribution of stress cardiomyopathy.  She did have reduced ejection fraction when compared with 2014.  Despite this she did not notice any specific cardiac symptoms during the time I took care of her.  She was able to ambulate to the bathroom without issues other than being very fearful of falling and feeling that her legs were generally weak with poor exercise tolerance.  She did work with physical therapy and did make gradual improvement.    Patient tolerated the " atorvastatin, lisinopril and Brilinta.  She will be continued on these as an outpatient.    She did have some constipation on discharge which resolved with Colace, MiraLAX and Dulcolax suppository.  I would suggest continuing to keep stools soft with Colace as needed.    She was also found to have urinary tract infection and was treated with Rocephin for 6 days.  Her urine resulted E. coli sensitive to cephalosporins.  It was not felt necessary to continue antibiotics on discharge as she did not have any fever or signs symptoms of ongoing infection.    Patient has close cardiology consultation with Isaura Fischer on October 5 at 1445 and should also follow-up with nursing home provider and/or PCP.            Consultations This Hospital Stay   CARDIAC REHAB IP CONSULT  SOCIAL WORK IP CONSULT  PHYSICAL THERAPY ADULT IP CONSULT  OCCUPATIONAL THERAPY ADULT IP CONSULT  PHYSICAL THERAPY ADULT IP CONSULT  OCCUPATIONAL THERAPY ADULT IP CONSULT  SMOKING CESSATION PROGRAM IP CONSULT    Code Status   No CPR- Do NOT Intubate    Time Spent on this Encounter   I, Damon Ruffin MD, personally saw the patient today and spent greater than 30 minutes discharging this patient.       Damon Ruffin MD  [facility]  [address]  [place] [address]  Phone: [phone]  Fax: [phone]  ______________________________________________________________________    Physical Exam   Vital Signs: Temp: 97.9  F (36.6  C) Temp src: Tympanic BP: 124/75 Pulse: 89   Resp: 18 SpO2: 94 % O2 Device: None (Room air)    Weight: 195 lbs 4.8 oz  Constitutional: awake, alert, cooperative, no apparent distress, and appears stated age  Respiratory: No increased work of breathing, good air exchange, clear to auscultation bilaterally, no crackles or wheezing  Cardiovascular: Regularrate and rhythm.  No murmurs rubs or gallops heard.   GI: Abdomen Soft, non-tender.  No masses, rebound or guarding.   Musculoskeletal:  Generalized weakness with no focal deficits   Neuropsychiatric: Alert and oriented x3       Primary Care Physician   Maria Teresa Fatima    Discharge Orders      Adult Cardiology Avery Orellana Referral      General info for SNF    Length of Stay Estimate: Short Term Care: Estimated # of Days <30  Condition at Discharge: Improving  Level of care:skilled   Rehabilitation Potential: Fair  Admission H&P remains valid and up-to-date: Yes  Recent Chemotherapy: N/A  Use Nursing Home Standing Orders: Yes     Mantoux instructions    Give two-step Mantoux (PPD) Per Facility Policy Yes     Follow Up and recommended labs and tests    Follow up with Nursing home physician.  I would suggest rechecking a BMP.  Follow-up with cardiology.     Reason for your hospital stay    You had a non-ST elevation myocardial infarction.  You are also having a lot of difficulty ambulating due to leg weakness.  I think the 2 are related.  You did have reduced heart function following this event but I am optimistic that it will gradually improve with time and new medications.  You have been started on high intensity statin, Brilinta, lisinopril and Lasix.  You have also been prescribed Colace as needed to prevent constipation     Intake and output    Every shift     Daily weights    Call Provider for weight gain of more than 2 pounds per day or 5 pounds per week.     Activity - Up with nursing assistance     Physical Therapy Adult Consult    Evaluate and treat as clinically indicated.    Reason: Leg weakness, reduced exercise tolerance     Occupational Therapy Adult Consult    Evaluate and treat as clinically indicated.    Reason: Leg weakness, reduced exercise tolerance     Fall precautions     Diet    Follow this diet upon discharge: Low-salt cardiac diet.       Significant Results and Procedures   Most Recent 3 CBC's:Recent Labs   Lab Test 09/25/22  0835 09/23/22  0551 09/22/22  0734   WBC 7.3 7.9 8.3   HGB 12.4 11.7 12.5   MCV 89 90 89   PLT  251 238 269     Most Recent 3 BMP's:Recent Labs   Lab Test 09/25/22  0835 09/23/22  0551 09/22/22  0734    138 137   POTASSIUM 4.3 3.9 3.9   CHLORIDE 101 102 100   CO2 31 29 30   BUN 16 21 19   CR 0.66 0.78 0.80   ANIONGAP 7 7 7   ОЛЬГА 9.7 9.3 9.6   * 113* 147*     Most Recent 3 Troponin's:No lab results found.  7-Day Micro Results     Collected Updated Procedure Result Status      09/27/2022 1258 09/27/2022 1342 Asymptomatic Influenza A/B & SARS-CoV2 (COVID-19) Virus PCR Multiplex Nose [[id number]]    Swab from Nose    Final result Component Value   Influenza A PCR Negative   Influenza B PCR Negative   RSV PCR Negative   SARS CoV2 PCR Negative   NEGATIVE: SARS-CoV-2 (COVID-19) RNA not detected, presumed negative.            09/21/2022 1127 09/21/2022 1212 Symptomatic; Unknown Influenza A/B & SARS-CoV2 (COVID-19) Virus PCR Multiplex Nose [[id number]]    Swab from Nose    Final result Component Value   Influenza A PCR Negative   Influenza B PCR Negative   RSV PCR Negative   SARS CoV2 PCR Negative   NEGATIVE: SARS-CoV-2 (COVID-19) RNA not detected, presumed negative.            09/21/2022 0931 09/23/2022 0734 Urine Culture [[id number]]    (Abnormal)   Urine, Clean Catch    Final result Component Value   Culture >100,000 CFU/mL Escherichia coli        Susceptibility      Escherichia coli      CHANA      Amoxicillin/Clavulanate <=8  Susceptible      Ampicillin >16  Resistant     Ampicillin/ Sulbactam 16  Intermediate      Aztreonam <=4  Susceptible      Cefepime <=2  Susceptible      Cefoxitin 16  Intermediate      Cefpodoxime <=2  Susceptible      Ceftazidime <=1  Susceptible      Ceftriaxone <=1  Susceptible      Cefuroxime 8  Susceptible      Ciprofloxacin   See below  [1]       Ertapenem <=0.5  Susceptible      Gentamicin <=4  Susceptible      Imipenem <=1  Susceptible      Levofloxacin   See below  [2]       Nitrofurantoin <=32  Susceptible      Piperacillin/Tazobactam <=16  Susceptible       Tetracycline >8  Resistant     Tobramycin <=4  Susceptible      Trimethoprim <=8  Susceptible      Trimethoprim/Sulfamethoxazole <=2/38  Susceptible                   [1]  Ciprofloxacin not resistant.  Due to test limitations, lab cannot provide CHANA to determine susceptibility.     [2]  Levofloxacin not resistant.  Due to test limitations, lab cannot provide CHANA to determine susceptibility.                       Discharge Medications   Current Discharge Medication List      START taking these medications    Details   atorvastatin (LIPITOR) 80 MG tablet Take 1 tablet (80 mg) by mouth daily  Qty: 30 tablet, Refills: 0    Associated Diagnoses: NSTEMI (non-ST elevated myocardial infarction) (H)      docusate sodium (COLACE) 100 MG capsule Take 1 capsule (100 mg) by mouth 2 times daily  Qty: 60 capsule, Refills: 0    Associated Diagnoses: NSTEMI (non-ST elevated myocardial infarction) (H)      furosemide (LASIX) 20 MG tablet Take 1 tablet (20 mg) by mouth daily  Qty: 30 tablet, Refills: 0    Associated Diagnoses: NSTEMI (non-ST elevated myocardial infarction) (H)      lisinopril (ZESTRIL) 2.5 MG tablet Take 1 tablet (2.5 mg) by mouth daily  Qty: 30 tablet, Refills: 0    Associated Diagnoses: NSTEMI (non-ST elevated myocardial infarction) (H)      ticagrelor (BRILINTA) 90 MG tablet Take 1 tablet (90 mg) by mouth 2 times daily  Qty: 60 tablet, Refills: 0    Associated Diagnoses: NSTEMI (non-ST elevated myocardial infarction) (H)         CONTINUE these medications which have NOT CHANGED    Details   acetaminophen (TYLENOL) 500 MG tablet Take 1,000 mg by mouth every 8 hours as needed for pain      aspirin 81 MG EC tablet Take 81 mg by mouth daily      Cranberry 1000 MG CAPS Take 1 tablet by mouth daily (with lunch)      metoprolol succinate ER (TOPROL XL) 50 MG 24 hr tablet Take 50 mg by mouth daily      nitroGLYcerin (NITROSTAT) 0.4 MG sublingual tablet Place 0.4 mg under the tongue every 5 minutes as needed for chest pain        oxybutynin ER (DITROPAN XL) 5 MG 24 hr tablet Take 5 mg by mouth 2 times daily      potassium 99 MG TABS Take 1 tablet by mouth daily (with lunch)      vitamin C (ASCORBIC ACID) 1000 MG TABS Take 1,000 mg by mouth daily         STOP taking these medications       pravastatin (PRAVACHOL) 20 MG tablet Comments:   Reason for Stopping:             Allergies   Allergies   Allergen Reactions     Codeine Nausea     Naproxen Nausea and Vomiting

## 2022-09-27 NOTE — PHARMACY - DISCHARGE MEDICATION RECONCILIATION AND EDUCATION
Pharmacy:  Discharge Counseling and Medication Reconciliation    Niru Durham  [address]  [place] [address]  [phone] (home)   89 year old female  PCP: Maria Teresa Richards    Allergies: Codeine and Naproxen    Discharge Counseling:    Patient going to SNF where nursing staff will coordinate medications.  Patient discharged prior to pharmacy meeting/providing handouts.      Discharge Medication Reconciliation:    It has been determined that the patient has an adequate supply of medications available or which can be obtained from the patient's preferred pharmacy, which staff has confirmed [facility].  An updated medication list will be faxed to the patient's pharmacy.    Thank you for the consult.    Moira Jama [facility]........September 27, 2022 2:11 PM

## 2022-09-27 NOTE — PROGRESS NOTES
Discharge Note      Data:  Niru Durham discharged to nursing home at 1407 via wheel chair. Accompanied by staff.    Action:  Written discharge/follow-up instructions were provided to caregiver. Prescriptions sent to patients preferred pharmacy. All belongings sent with patient.    Response:  Caregiver verbalized understanding of discharge instructions, reason for discharge, and necessary follow-up appointments.

## 2022-09-27 NOTE — PROGRESS NOTES
:     Spoke with Smita from [facility]. She stated that patients prior authorization has been accepted. A ride has been set up for 1400. [facility] will provide transportation.      will continue to follow for discharge planning needs.     JAYLEEN Cooper on 9/27/2022 at 10:12 AM     :     Spoke with Smita from [facility]. She requested that patient get a COVID test prior to coming to their facility. She also asked that patients temperature is re-checked to ensure she does not have a fever. Updated Charge Nurse regarding this information.     Plan for [facility] to  at 1400.     PAS complete: [id number]    JAYLEEN Cooper on 9/27/2022 at 12:56 PM

## 2022-09-27 NOTE — PROGRESS NOTES
[facility] called for nurse to nurse report. No answer by nurse message was left to call back.

## 2022-09-28 NOTE — PROGRESS NOTES
09/27/22 1634   Signing Clinician's Name / Credentials   Signing clinician's name / credentials Shelbie Mueller OTR/L   Quick Adds   Rehab Discipline OT   Functional Transfer Training   Symptoms Noted During/After Treatment fatigue   Gait Training   [place] Level (Gait Training) minimum assist (75% patient effort)   Physical Assistance Level (Gait Training) 1 person assist   Assistive Device (Gait Training) rolling walker   Grooming Training   [place] Level (Grooming Training) stand-by assist   Assistance (Grooming Training) supervision   Bathing Training   [place] Level (Bathing Training) maximum assist (25% patient effort)   Assistance (Bathing Training) 1 person assist   Upper Body Dressing Training   [place] Level (Upper Body Dressing Training) stand-by assist   Assistance (Upper Body Dressing Training) supervision   Lower Body Dressing Training   Lower Body Dressing Training Assistance maximum assist (25% patient effort)   Lower Body Dressing Training Assistance 1 person assist   Toilet Training   [place] Level (Toilet Training) moderate assist (50% patient effort)   Assistance (Toilet Training) 1 person assist   OT Discharge Planning   OT Discharge Recommendation (DC Rec) Transitional Care Facility   OT Rationale for DC Rec Pt would benefit from more therapy prior to returning home as previous   OT Brief overview of current status Pt has progressed slowly, ambulating in room with min assist of 1-2 with FWW, needs mod to max assist with most self cares, will benefit from ongoing OT.   Additional Documentation   OT Plan d/c to STR today   Total Session Time   Total Session Time (minutes) 75 minutes      MOTTLING TO BOTH LOWER LEGS

## 2022-09-29 NOTE — PROGRESS NOTES
09/27/22 1400   Signing Clinician's Name / Credentials   Signing clinician's name / credentials Josemanuel Braun MPT   Quick Adds   Rehab Discipline PT   Functional Transfer Training   Symptoms Noted During/After Treatment fatigue   Treatment Detail minimal assist with Fww   Gait Training   Symptoms Noted During/After Treatment (Gait Training) fatigue   Distance in Feet (Required for LE Total Joints) 30   Treatment Detail short ambulation within patient's room   [place] Level (Gait Training) minimum assist (75% patient effort)   Physical Assistance Level (Gait Training) 1 person assist;verbal cues   Weight Bearing (Gait Training) full weight-bearing   Assistive Device (Gait Training) rolling walker   Additional Documentation   Rehab Comments progress with gait tolerance today, however, decreased spencer and step length remain to limit distance   PT Plan patient discharging to short term rehab   Total Session Time   Total Session Time (minutes) 40 minutes

## 2022-09-30 NOTE — ED NOTES
Patient admitted to [facility] for nstemi and uti. She was discharged to [facility] on 9/27. She went to primary care for a follow up and is reporting belly pain and constipation for 1 week. She was dry heaving and has a fever greater than 100 per RN.

## 2022-09-30 NOTE — ED PROVIDER NOTES
History     Chief Complaint   Patient presents with     Constipation     Fever     Here with her daughter    The history is provided by the patient, medical records and a relative.     Niru Durham is a 89 year old female who has a few concerns today.  She was seen at the [facility] today with nausea, belly pain, constipation, dry heaves and fever. They recommended that she come to the ED. She tells me that she has not had a BM for about a week now.      She was hospitalized at [facility] for NSTEMI and UTI and was discharged to [facility] three days ago, Sept 27.  She has completed the antibiotics prescribed for her UTI.  She has HTN (on Lasix, lisinopril, Brilinta, aspirin, metoprolol, nitroglycerine Sl PRN), and is on Lipitor.    Allergies:  Allergies   Allergen Reactions     Codeine Nausea     Naproxen Nausea and Vomiting       Problem List:    Patient Active Problem List    Diagnosis Date Noted     NSTEMI (non-ST elevated myocardial infarction) (H) 09/21/2022     Priority: Medium     Nonmelanoma skin cancer 10/28/2019     Priority: Medium     Onychocryptosis 10/28/2019     Priority: Medium     Urinary tract infection 10/28/2019     Priority: Medium     Sepsis (H) 10/28/2019     Priority: Medium     Cognitive impairment 01/06/2019     Priority: Medium     Goiter 01/05/2019     Priority: Medium     Kyphosis (acquired) (postural) 01/05/2019     Priority: Medium     Overview:   Kyphosis of the spine in spite of estrogen therapy, compression fracture 09/05  Kyphosis of the spine in spite of estrogen therapy, compression fracture 09/05       Lactose intolerance 01/05/2019     Priority: Medium     Overview:   chronic  chronic       Other urinary incontinence 01/05/2019     Priority: Medium     Bladder instability 10/04/2017     Priority: Medium     Chronic right hip pain 10/04/2017     Priority: Medium     Colonoscopy refused 10/04/2016     Priority: Medium     Gout 10/22/2015     Priority: Medium      Hypertension 10/22/2015     Priority: Medium     Coronary atherosclerosis of native coronary artery 2014     Priority: Medium     Overview:   2014, NSTEMI, JANE x 2 to LAD and RCA  NSTEMI  with JANE to LAD and RCA       Unstable angina (H) 2014     Priority: Medium     Full dentures 10/17/2013     Priority: Medium     Osteopenia 10/17/2013     Priority: Medium     Overview:   Dexa 2007  Dexa 2007       Onychomycosis 11/15/2011     Priority: Medium     Hyperlipidemia 10/11/2010     Priority: Medium        Past Medical History:    Past Medical History:   Diagnosis Date     Acquired postural kyphosis      Acute myocardial infarction, subendocardial infarction (H)      Female stress incontinence      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 Dr Matthews. Repeat 10 years     ENDOSCOPIC STRIPPING VEIN(S)      ,Bilateral vein stripping     EXTRACAPSULAR CATARACT EXTRATION WITH INTRAOCULAR LENS IMPLANT      Bilateral cataract surgery with lens implant     HYSTERECTOMY TOTAL ABDOMINAL, BILATERAL SALPINGO-OOPHORECTOMY, COMBINED           OTHER SURGICAL HISTORY      , ,308851,DILATION AND CURETTAGE       Family History:    Family History   Problem Relation Age of Onset     Diabetes Mother         Diabetes,diabetic complications     Breast Cancer No family hx of         Cancer-breast       Social History:  Marital Status:   [5]  Social History     Tobacco Use     Smoking status: Former Smoker     Packs/day: 0.10     Years: 2.00     Pack years: 0.20     Types: Cigarettes     Quit date: 1983     Years since quittin.6     Smokeless tobacco: Never  Used   Substance Use Topics     Alcohol use: No     Drug use: Unknown     Types: Other     Comment: Drug use: No        Medications:    acetaminophen (TYLENOL) 500 MG tablet  aspirin 81 MG EC tablet  atorvastatin (LIPITOR) 80 MG tablet  Cranberry 1000 MG CAPS  docusate sodium (COLACE) 100 MG capsule  furosemide (LASIX) 20 MG tablet  lisinopril (ZESTRIL) 2.5 MG tablet  metoprolol succinate ER (TOPROL XL) 50 MG 24 hr tablet  nitroGLYcerin (NITROSTAT) 0.4 MG sublingual tablet  oxybutynin ER (DITROPAN XL) 5 MG 24 hr tablet  potassium 99 MG TABS  ticagrelor (BRILINTA) 90 MG tablet  vitamin C (ASCORBIC ACID) 1000 MG TABS          Review of Systems   Constitutional: Positive for fever.   Gastrointestinal: Positive for constipation and nausea.   All other systems reviewed and are negative.      Physical Exam   BP: (!) 165/70  Pulse: 86  Temp: (!) 100.5  F (38.1  C)  Resp: 18  Weight: 88.5 kg (195 lb)  SpO2: 92 %      Physical Exam  Vitals and nursing note reviewed.   Constitutional:       General: She is not in acute distress.     Appearance: Normal appearance. She is not ill-appearing, toxic-appearing or diaphoretic.   Cardiovascular:      Rate and Rhythm: Normal rate and regular rhythm.      Pulses: Normal pulses.      Heart sounds: Normal heart sounds. No murmur heard.  Pulmonary:      Effort: Pulmonary effort is normal. No respiratory distress.      Breath sounds: Normal breath sounds.   Abdominal:      General: Bowel sounds are normal.      Palpations: Abdomen is soft.      Tenderness: There is abdominal tenderness. There is no guarding or rebound.   Musculoskeletal:         General: No swelling or tenderness.      Right lower leg: No edema.      Left lower leg: No edema.   Skin:     General: Skin is warm and dry.   Neurological:      General: No focal deficit present.      Mental Status: She is alert and oriented to person, place, and time.         Results for orders placed or performed during the hospital encounter  of 09/30/22 (from the past 24 hour(s))   UA with Microscopic reflex to Culture    Specimen: Urine, Clean Catch   Result Value Ref Range    Color Urine Light Yellow Colorless, Straw, Light Yellow, Yellow    Appearance Urine Clear Clear    Glucose Urine Negative Negative mg/dL    Bilirubin Urine Negative Negative    Ketones Urine Negative Negative mg/dL    Specific Gravity Urine 1.014 1.000 - 1.030    Blood Urine Negative Negative    pH Urine 6.5 5.0 - 9.0    Protein Albumin Urine Negative Negative mg/dL    Urobilinogen Urine Normal Normal, 2.0 mg/dL    Nitrite Urine Negative Negative    Leukocyte Esterase Urine Negative Negative    Mucus Urine Present (A) None Seen /LPF    RBC Urine 1 <=2 /HPF    WBC Urine 1 <=5 /HPF    Hyaline Casts Urine 1 <=2 /LPF    Narrative    Urine Culture not indicated   CBC with platelets differential    Narrative    The following orders were created for panel order CBC with platelets differential.  Procedure                               Abnormality         Status                     ---------                               -----------         ------                     CBC with platelets and d...[[id number]]  Abnormal            Final result                 Please view results for these tests on the individual orders.   Comprehensive metabolic panel   Result Value Ref Range    Sodium 138 134 - 144 mmol/L    Potassium 4.2 3.5 - 5.1 mmol/L    Chloride 99 98 - 107 mmol/L    Carbon Dioxide (CO2) 29 21 - 31 mmol/L    Anion Gap 10 3 - 14 mmol/L    Urea Nitrogen 19 7 - 25 mg/dL    Creatinine 0.72 0.60 - 1.20 mg/dL    Calcium 10.0 8.6 - 10.3 mg/dL    Glucose 135 (H) 70 - 105 mg/dL    Alkaline Phosphatase 93 34 - 104 U/L    AST 32 13 - 39 U/L    ALT 24 7 - 52 U/L    Protein Total 8.1 6.4 - 8.9 g/dL    Albumin 4.0 3.5 - 5.7 g/dL    Bilirubin Total 0.6 0.3 - 1.0 mg/dL    GFR Estimate 79 >60 mL/min/1.73m2   Lipase   Result Value Ref Range    Lipase 6 (L) 11 - 82 U/L   Troponin I   Result Value Ref Range     Troponin I 31.5 0.0 - 34.0 pg/mL   Nt probnp inpatient (BNP)   Result Value Ref Range    N terminal Pro BNP Inpatient 262 (H) 0 - 100 pg/mL   CBC with platelets and differential   Result Value Ref Range    WBC Count 9.0 4.0 - 11.0 10e3/uL    RBC Count 4.54 3.80 - 5.20 10e6/uL    Hemoglobin 13.3 11.7 - 15.7 g/dL    Hematocrit 40.4 35.0 - 47.0 %    MCV 89 78 - 100 fL    MCH 29.3 26.5 - 33.0 pg    MCHC 32.9 31.5 - 36.5 g/dL    RDW 14.5 10.0 - 15.0 %    Platelet Count 342 150 - 450 10e3/uL    % Neutrophils 83 %    % Lymphocytes 8 %    % Monocytes 7 %    % Eosinophils 1 %    % Basophils 0 %    % Immature Granulocytes 1 %    NRBCs per 100 WBC 0 <1 /100    Absolute Neutrophils 7.4 1.6 - 8.3 10e3/uL    Absolute Lymphocytes 0.7 (L) 0.8 - 5.3 10e3/uL    Absolute Monocytes 0.6 0.0 - 1.3 10e3/uL    Absolute Eosinophils 0.1 0.0 - 0.7 10e3/uL    Absolute Basophils 0.0 0.0 - 0.2 10e3/uL    Absolute Immature Granulocytes 0.1 <=0.4 10e3/uL    Absolute NRBCs 0.0 10e3/uL   Extra Tube    Narrative    The following orders were created for panel order Extra Tube.  Procedure                               Abnormality         Status                     ---------                               -----------         ------                     Extra Blue Top Tube[[id number]]                              Final result               Extra Serum Separator Tu...[[id number]]                      Final result               Extra Green Top (Lithium...[[id number]]                      Final result                 Please view results for these tests on the individual orders.   Extra Blue Top Tube   Result Value Ref Range    Hold Specimen     Extra Serum Separator Tube (SST)   Result Value Ref Range    Hold Specimen     Extra Green Top (Lithium Heparin) ON ICE   Result Value Ref Range    Hold Specimen     XR Abdomen 1 View    Narrative    XR ABDOMEN 1 VIEW   9/30/2022 4:10 PM    History:Female,age  89 years, constipation, fever    Comparison:    FINDINGS:  Single view is submitted. Moderate volume of stool is seen  within the colon. There is no evidence of free air or evidence of  obstruction.      Impression    IMPRESSION: Moderate volume of stool, no evidence of obstruction or  evidence of perforation.    LOIDA HANNA MD         SYSTEM ID:  [id number]   XR Chest 1 View    Narrative    Procedure:XR CHEST 1 VIEW    Clinical history:Female, 89 years, shortness of breath, fever    Technique: Single view was obtained.    Comparison: 9/25/2022    Findings: The cardiac silhouette is normal. The pulmonary vasculature  is normal.    The lungs are clear. Bony structures are unremarkable.      Impression    Impression:   No acute abnormality. No evidence of acute or active cardiopulmonary  disease.    LOIDA HANNA MD         SYSTEM ID:  [id number]   Symptomatic; Yes; 9/30/2022 Influenza A/B & SARS-CoV2 (COVID-19) Virus PCR Multiplex Nose    Specimen: Nose; Swab   Result Value Ref Range    Influenza A PCR Negative Negative    Influenza B PCR Negative Negative    RSV PCR Negative Negative    SARS CoV2 PCR Negative Negative    Narrative    Testing was performed using the Xpert Xpress CoV2/Flu/RSV Assay on the [employer] GeneXpert Instrument. This test should be ordered for the detection of SARS-CoV-2 and influenza viruses in individuals who meet clinical and/or epidemiological criteria. Test performance is unknown in asymptomatic patients. This test is for in vitro diagnostic use under the FDA EUA for laboratories certified under CLIA to perform high or moderate complexity testing. This test has not been FDA cleared or approved. A negative result does not rule out the presence of PCR inhibitors in the specimen or target RNA in concentration below the limit of detection for the assay. If only one viral target is positive but coinfection with multiple targets is suspected, the sample should be re-tested with another FDA cleared, approved, or authorized test, if coinfection would  change clinical management. This test was validated by the [facility]. These laboratories are certified under the Clinical  Laboratory Improvement Amendments of 1988 (CLIA-88) as qualified to perform high complexity laboratory testing.       Medications   acetaminophen (TYLENOL) tablet 1,000 mg (1,000 mg Oral Given 9/30/22 1538)   bisacodyl (DULCOLAX) suppository 10 mg (10 mg Rectal Given 9/30/22 1706)       Assessments & Plan (with Medical Decision Making)  Niru Durham is a 89 year old female here from [facility] who has a few concerns today.  She was seen at the [facility] today with nausea, belly pain, constipation, dry heaves and fever. They recommended that she come to the ED. She tells me that she has not had a BM for about a week now.  She was hospitalized at [facility] for NSTEMI and UTI and was discharged to [facility] three days ago, Sept 27.  She has completed the antibiotics prescribed for her UTI.  She has HTN (on Lasix, lisinopril, Brilinta, aspirin, metoprolol, nitroglycerine Sl PRN), and is on Lipitor.  VS in the ED BP (!) 165/70   Pulse 86   Temp (!) 100.5  F (38.1  C) (Tympanic)   Resp 18   Wt 88.5 kg (195 lb)   SpO2 92%   BMI 35.67 kg/m    Exam shows normal bowel sounds and some low abdominal discomfort. Heart and lung sounds are normal.  We gave her some Tylenol for fever.  Labs show CBC normal, CMP with glucose 135, lipase normal, , troponin normal, UA shows no UTI. 4 Plex negative.  Chest xray stable.  Abdominal xray shows constipation, no obstruction.  We placed a suppository. We will get her home.     I have reviewed the nursing notes.    I have reviewed the findings, diagnosis, plan and need for follow up with the patient.    Final diagnoses:   Constipation, unspecified constipation type       9/30/2022   [facility] AND [facility], Isaac Martinez MD  09/30/22 0254

## 2022-09-30 NOTE — ED TRIAGE NOTES
ED Nursing Triage Note (General)   ________________________________    Niru JENN Durham is a 89 year old Female that presents to triage via private vehicle from [facility] with complaints of constipation, SOB, and fever.  Patient states it has been approx 7 days since her last BM and has been experiencing intermittent fevers as well. Patient states she has had one episode of emesis today, however, denies abdominal pain. Patient did not receive labs or imaging prior to arrival.   Significant symptoms had onset 7 days ago.  Patient appears alert behavior.  GCS-15  Airway: intact  Breathing noted as Normal  Action taken: 3      PRE HOSPITAL PRIOR LIVING SITUATION-home

## 2022-10-07 NOTE — TELEPHONE ENCOUNTER
Left message for [facility] staff to return call to complete 30 Day Med Review.    OLIVERIO FREGOSO RN on 10/7/2022 at 10:37 AM

## 2023-01-01 ENCOUNTER — TELEPHONE (OUTPATIENT)
Facility: OTHER | Age: 88
End: 2023-01-01

## 2023-01-01 ENCOUNTER — HOSPITAL ENCOUNTER (INPATIENT)
Facility: OTHER | Age: 88
LOS: 3 days | Discharge: NURSING, REHAB OR LONG-TERM CARE | DRG: 871 | End: 2023-02-09
Attending: EMERGENCY MEDICINE
Payer: COMMERCIAL

## 2023-01-01 ENCOUNTER — APPOINTMENT (OUTPATIENT)
Dept: PHYSICAL THERAPY | Facility: OTHER | Age: 88
DRG: 871 | End: 2023-01-01
Attending: FAMILY MEDICINE
Payer: COMMERCIAL

## 2023-01-01 ENCOUNTER — APPOINTMENT (OUTPATIENT)
Dept: GENERAL RADIOLOGY | Facility: OTHER | Age: 88
DRG: 871 | End: 2023-01-01
Attending: EMERGENCY MEDICINE
Payer: COMMERCIAL

## 2023-01-01 ENCOUNTER — APPOINTMENT (OUTPATIENT)
Dept: ULTRASOUND IMAGING | Facility: OTHER | Age: 88
DRG: 871 | End: 2023-01-01
Attending: EMERGENCY MEDICINE
Payer: COMMERCIAL

## 2023-01-01 ENCOUNTER — PATIENT OUTREACH (OUTPATIENT)
Dept: FAMILY MEDICINE | Facility: OTHER | Age: 88
End: 2023-01-01
Payer: COMMERCIAL

## 2023-01-01 ENCOUNTER — APPOINTMENT (OUTPATIENT)
Dept: OCCUPATIONAL THERAPY | Facility: OTHER | Age: 88
DRG: 871 | End: 2023-01-01
Attending: FAMILY MEDICINE
Payer: COMMERCIAL

## 2023-01-01 ENCOUNTER — ANESTHESIA EVENT (OUTPATIENT)
Dept: MEDSURG UNIT | Facility: OTHER | Age: 88
DRG: 871 | End: 2023-01-01
Payer: COMMERCIAL

## 2023-01-01 ENCOUNTER — APPOINTMENT (OUTPATIENT)
Dept: CT IMAGING | Facility: OTHER | Age: 88
DRG: 871 | End: 2023-01-01
Attending: FAMILY MEDICINE
Payer: COMMERCIAL

## 2023-01-01 ENCOUNTER — APPOINTMENT (OUTPATIENT)
Dept: CT IMAGING | Facility: OTHER | Age: 88
DRG: 871 | End: 2023-01-01
Attending: EMERGENCY MEDICINE
Payer: COMMERCIAL

## 2023-01-01 ENCOUNTER — ANESTHESIA (OUTPATIENT)
Dept: MEDSURG UNIT | Facility: OTHER | Age: 88
DRG: 871 | End: 2023-01-01
Payer: COMMERCIAL

## 2023-01-01 ENCOUNTER — APPOINTMENT (OUTPATIENT)
Dept: ULTRASOUND IMAGING | Facility: OTHER | Age: 88
DRG: 871 | End: 2023-01-01
Attending: FAMILY MEDICINE
Payer: COMMERCIAL

## 2023-01-01 VITALS
TEMPERATURE: 100 F | DIASTOLIC BLOOD PRESSURE: 79 MMHG | BODY MASS INDEX: 35.64 KG/M2 | RESPIRATION RATE: 20 BRPM | OXYGEN SATURATION: 91 % | HEIGHT: 62 IN | WEIGHT: 193.7 LBS | SYSTOLIC BLOOD PRESSURE: 166 MMHG | HEART RATE: 89 BPM

## 2023-01-01 DIAGNOSIS — I26.93 SINGLE SUBSEGMENTAL PULMONARY EMBOLISM WITHOUT ACUTE COR PULMONALE (H): ICD-10-CM

## 2023-01-01 DIAGNOSIS — K86.89 PANCREATIC MASS: ICD-10-CM

## 2023-01-01 DIAGNOSIS — C78.7 MALIGNANT NEOPLASM METASTATIC TO LIVER (H): ICD-10-CM

## 2023-01-01 DIAGNOSIS — C80.1 MALIGNANT NEOPLASTIC DISEASE (H): ICD-10-CM

## 2023-01-01 DIAGNOSIS — R50.9 FEVER, UNSPECIFIED FEVER CAUSE: ICD-10-CM

## 2023-01-01 DIAGNOSIS — R50.9 FEBRILE ILLNESS: ICD-10-CM

## 2023-01-01 DIAGNOSIS — Z11.52 ENCOUNTER FOR SCREENING LABORATORY TESTING FOR COVID-19 VIRUS: ICD-10-CM

## 2023-01-01 DIAGNOSIS — I21.4 NSTEMI (NON-ST ELEVATED MYOCARDIAL INFARCTION) (H): ICD-10-CM

## 2023-01-01 DIAGNOSIS — C78.7 SECONDARY MALIGNANT NEOPLASM OF LIVER (H): Primary | ICD-10-CM

## 2023-01-01 LAB
ALBUMIN SERPL BCG-MCNC: 3.9 G/DL (ref 3.5–5.2)
ALBUMIN UR-MCNC: 30 MG/DL
ALP SERPL-CCNC: 239 U/L (ref 35–104)
ALT SERPL W P-5'-P-CCNC: 97 U/L (ref 10–35)
ANION GAP SERPL CALCULATED.3IONS-SCNC: 11 MMOL/L (ref 7–15)
ANION GAP SERPL CALCULATED.3IONS-SCNC: 11 MMOL/L (ref 7–15)
ANION GAP SERPL CALCULATED.3IONS-SCNC: 14 MMOL/L (ref 7–15)
APPEARANCE UR: CLEAR
AST SERPL W P-5'-P-CCNC: 229 U/L (ref 10–35)
BACTERIA BLD CULT: ABNORMAL
BACTERIA BLD CULT: NO GROWTH
BACTERIA UR CULT: NORMAL
BASOPHILS # BLD AUTO: 0 10E3/UL (ref 0–0.2)
BASOPHILS NFR BLD AUTO: 0 %
BILIRUB SERPL-MCNC: 0.8 MG/DL
BILIRUB UR QL STRIP: NEGATIVE
BUN SERPL-MCNC: 12.1 MG/DL (ref 8–23)
BUN SERPL-MCNC: 13.1 MG/DL (ref 8–23)
BUN SERPL-MCNC: 19.9 MG/DL (ref 8–23)
CALCIUM SERPL-MCNC: 9.2 MG/DL (ref 8.2–9.6)
CALCIUM SERPL-MCNC: 9.2 MG/DL (ref 8.2–9.6)
CALCIUM SERPL-MCNC: 9.7 MG/DL (ref 8.2–9.6)
CHLORIDE SERPL-SCNC: 104 MMOL/L (ref 98–107)
CHLORIDE SERPL-SCNC: 96 MMOL/L (ref 98–107)
CHLORIDE SERPL-SCNC: 98 MMOL/L (ref 98–107)
COLOR UR AUTO: YELLOW
CREAT SERPL-MCNC: 0.66 MG/DL (ref 0.51–0.95)
CREAT SERPL-MCNC: 0.76 MG/DL (ref 0.51–0.95)
CREAT SERPL-MCNC: 0.86 MG/DL (ref 0.51–0.95)
D DIMER PPP FEU-MCNC: 6.57 UG/ML FEU (ref 0–0.5)
DEPRECATED HCO3 PLAS-SCNC: 25 MMOL/L (ref 22–29)
DEPRECATED HCO3 PLAS-SCNC: 26 MMOL/L (ref 22–29)
DEPRECATED HCO3 PLAS-SCNC: 26 MMOL/L (ref 22–29)
EOSINOPHIL # BLD AUTO: 0 10E3/UL (ref 0–0.7)
EOSINOPHIL NFR BLD AUTO: 0 %
ERYTHROCYTE [DISTWIDTH] IN BLOOD BY AUTOMATED COUNT: 14.2 % (ref 10–15)
ERYTHROCYTE [DISTWIDTH] IN BLOOD BY AUTOMATED COUNT: 14.2 % (ref 10–15)
ERYTHROCYTE [DISTWIDTH] IN BLOOD BY AUTOMATED COUNT: 14.3 % (ref 10–15)
FLUAV RNA SPEC QL NAA+PROBE: NEGATIVE
FLUAV RNA SPEC QL NAA+PROBE: NEGATIVE
FLUBV RNA RESP QL NAA+PROBE: NEGATIVE
FLUBV RNA RESP QL NAA+PROBE: NEGATIVE
GFR SERPL CREATININE-BSD FRML MDRD: 63 ML/MIN/1.73M2
GFR SERPL CREATININE-BSD FRML MDRD: 74 ML/MIN/1.73M2
GFR SERPL CREATININE-BSD FRML MDRD: 82 ML/MIN/1.73M2
GLUCOSE SERPL-MCNC: 113 MG/DL (ref 70–99)
GLUCOSE SERPL-MCNC: 114 MG/DL (ref 70–99)
GLUCOSE SERPL-MCNC: 135 MG/DL (ref 70–99)
GLUCOSE UR STRIP-MCNC: NEGATIVE MG/DL
HCT VFR BLD AUTO: 33.5 % (ref 35–47)
HCT VFR BLD AUTO: 34.2 % (ref 35–47)
HCT VFR BLD AUTO: 37.7 % (ref 35–47)
HGB BLD-MCNC: 10.8 G/DL (ref 11.7–15.7)
HGB BLD-MCNC: 11.2 G/DL (ref 11.7–15.7)
HGB BLD-MCNC: 12.5 G/DL (ref 11.7–15.7)
HGB UR QL STRIP: NEGATIVE
HOLD SPECIMEN: NORMAL
IMM GRANULOCYTES # BLD: 0.7 10E3/UL
IMM GRANULOCYTES NFR BLD: 3 %
INR PPP: 1.37 (ref 0.85–1.15)
KETONES UR STRIP-MCNC: 20 MG/DL
LACTATE SERPL-SCNC: 1.1 MMOL/L (ref 0.7–2)
LACTATE SERPL-SCNC: 1.4 MMOL/L (ref 0.7–2)
LACTATE SERPL-SCNC: 1.4 MMOL/L (ref 0.7–2)
LACTATE SERPL-SCNC: 1.9 MMOL/L (ref 0.7–2)
LACTATE SERPL-SCNC: 2.1 MMOL/L (ref 0.7–2)
LACTATE SERPL-SCNC: 3.1 MMOL/L (ref 0.7–2)
LEUKOCYTE ESTERASE UR QL STRIP: ABNORMAL
LIPASE SERPL-CCNC: 69 U/L (ref 13–60)
LYMPHOCYTES # BLD AUTO: 0.7 10E3/UL (ref 0.8–5.3)
LYMPHOCYTES NFR BLD AUTO: 3 %
MAGNESIUM SERPL-MCNC: 1.9 MG/DL (ref 1.7–2.3)
MAGNESIUM SERPL-MCNC: 2 MG/DL (ref 1.7–2.3)
MAGNESIUM SERPL-MCNC: 2 MG/DL (ref 1.7–2.3)
MCH RBC QN AUTO: 28.6 PG (ref 26.5–33)
MCH RBC QN AUTO: 28.7 PG (ref 26.5–33)
MCH RBC QN AUTO: 28.8 PG (ref 26.5–33)
MCHC RBC AUTO-ENTMCNC: 31.6 G/DL (ref 31.5–36.5)
MCHC RBC AUTO-ENTMCNC: 33.2 G/DL (ref 31.5–36.5)
MCHC RBC AUTO-ENTMCNC: 33.4 G/DL (ref 31.5–36.5)
MCV RBC AUTO: 86 FL (ref 78–100)
MCV RBC AUTO: 87 FL (ref 78–100)
MCV RBC AUTO: 91 FL (ref 78–100)
MONOCYTES # BLD AUTO: 1 10E3/UL (ref 0–1.3)
MONOCYTES NFR BLD AUTO: 5 %
NEUTROPHILS # BLD AUTO: 20.2 10E3/UL (ref 1.6–8.3)
NEUTROPHILS NFR BLD AUTO: 89 %
NITRATE UR QL: NEGATIVE
NRBC # BLD AUTO: 0 10E3/UL
NRBC BLD AUTO-RTO: 0 /100
NT-PROBNP SERPL-MCNC: 435 PG/ML (ref 0–1800)
PH UR STRIP: 7 [PH] (ref 5–9)
PLATELET # BLD AUTO: 299 10E3/UL (ref 150–450)
PLATELET # BLD AUTO: 306 10E3/UL (ref 150–450)
PLATELET # BLD AUTO: 379 10E3/UL (ref 150–450)
POTASSIUM SERPL-SCNC: 3.4 MMOL/L (ref 3.4–5.3)
POTASSIUM SERPL-SCNC: 3.8 MMOL/L (ref 3.4–5.3)
POTASSIUM SERPL-SCNC: 3.8 MMOL/L (ref 3.4–5.3)
POTASSIUM SERPL-SCNC: 3.9 MMOL/L (ref 3.4–5.3)
POTASSIUM SERPL-SCNC: 3.9 MMOL/L (ref 3.4–5.3)
POTASSIUM SERPL-SCNC: 4.1 MMOL/L (ref 3.4–5.3)
PROCALCITONIN SERPL IA-MCNC: 0.44 NG/ML
PROCALCITONIN SERPL IA-MCNC: 1.15 NG/ML
PROT SERPL-MCNC: 8 G/DL (ref 6.4–8.3)
RBC # BLD AUTO: 3.77 10E6/UL (ref 3.8–5.2)
RBC # BLD AUTO: 3.89 10E6/UL (ref 3.8–5.2)
RBC # BLD AUTO: 4.35 10E6/UL (ref 3.8–5.2)
RBC URINE: 6 /HPF
RSV RNA SPEC NAA+PROBE: NEGATIVE
RSV RNA SPEC NAA+PROBE: NEGATIVE
SARS-COV-2 RNA RESP QL NAA+PROBE: NEGATIVE
SARS-COV-2 RNA RESP QL NAA+PROBE: NEGATIVE
SODIUM SERPL-SCNC: 135 MMOL/L (ref 136–145)
SODIUM SERPL-SCNC: 135 MMOL/L (ref 136–145)
SODIUM SERPL-SCNC: 141 MMOL/L (ref 136–145)
SP GR UR STRIP: 1.02 (ref 1–1.03)
SQUAMOUS EPITHELIAL: 5 /HPF
TROPONIN T SERPL HS-MCNC: 16 NG/L
TROPONIN T SERPL HS-MCNC: 19 NG/L
UROBILINOGEN UR STRIP-MCNC: NORMAL MG/DL
WBC # BLD AUTO: 19.2 10E3/UL (ref 4–11)
WBC # BLD AUTO: 19.5 10E3/UL (ref 4–11)
WBC # BLD AUTO: 22.7 10E3/UL (ref 4–11)
WBC URINE: 23 /HPF

## 2023-01-01 PROCEDURE — 250N000013 HC RX MED GY IP 250 OP 250 PS 637: Performed by: FAMILY MEDICINE

## 2023-01-01 PROCEDURE — 99233 SBSQ HOSP IP/OBS HIGH 50: CPT | Performed by: FAMILY MEDICINE

## 2023-01-01 PROCEDURE — 83605 ASSAY OF LACTIC ACID: CPT | Performed by: EMERGENCY MEDICINE

## 2023-01-01 PROCEDURE — 258N000003 HC RX IP 258 OP 636: Performed by: FAMILY MEDICINE

## 2023-01-01 PROCEDURE — 83880 ASSAY OF NATRIURETIC PEPTIDE: CPT | Performed by: EMERGENCY MEDICINE

## 2023-01-01 PROCEDURE — 250N000011 HC RX IP 250 OP 636: Performed by: FAMILY MEDICINE

## 2023-01-01 PROCEDURE — 87637 SARSCOV2&INF A&B&RSV AMP PRB: CPT | Performed by: EMERGENCY MEDICINE

## 2023-01-01 PROCEDURE — 120N000001 HC R&B MED SURG/OB

## 2023-01-01 PROCEDURE — 84145 PROCALCITONIN (PCT): CPT | Performed by: FAMILY MEDICINE

## 2023-01-01 PROCEDURE — 84132 ASSAY OF SERUM POTASSIUM: CPT | Performed by: FAMILY MEDICINE

## 2023-01-01 PROCEDURE — 71275 CT ANGIOGRAPHY CHEST: CPT

## 2023-01-01 PROCEDURE — 80053 COMPREHEN METABOLIC PANEL: CPT | Performed by: EMERGENCY MEDICINE

## 2023-01-01 PROCEDURE — 87077 CULTURE AEROBIC IDENTIFY: CPT | Performed by: EMERGENCY MEDICINE

## 2023-01-01 PROCEDURE — 36415 COLL VENOUS BLD VENIPUNCTURE: CPT | Performed by: FAMILY MEDICINE

## 2023-01-01 PROCEDURE — 250N000011 HC RX IP 250 OP 636: Performed by: EMERGENCY MEDICINE

## 2023-01-01 PROCEDURE — 99285 EMERGENCY DEPT VISIT HI MDM: CPT | Mod: CS | Performed by: EMERGENCY MEDICINE

## 2023-01-01 PROCEDURE — 80048 BASIC METABOLIC PNL TOTAL CA: CPT | Performed by: FAMILY MEDICINE

## 2023-01-01 PROCEDURE — 83690 ASSAY OF LIPASE: CPT | Performed by: EMERGENCY MEDICINE

## 2023-01-01 PROCEDURE — 99239 HOSP IP/OBS DSCHRG MGMT >30: CPT | Performed by: FAMILY MEDICINE

## 2023-01-01 PROCEDURE — 83735 ASSAY OF MAGNESIUM: CPT | Performed by: FAMILY MEDICINE

## 2023-01-01 PROCEDURE — 82310 ASSAY OF CALCIUM: CPT | Performed by: FAMILY MEDICINE

## 2023-01-01 PROCEDURE — 83605 ASSAY OF LACTIC ACID: CPT | Performed by: FAMILY MEDICINE

## 2023-01-01 PROCEDURE — 250N000013 HC RX MED GY IP 250 OP 250 PS 637: Performed by: EMERGENCY MEDICINE

## 2023-01-01 PROCEDURE — 96375 TX/PRO/DX INJ NEW DRUG ADDON: CPT | Performed by: EMERGENCY MEDICINE

## 2023-01-01 PROCEDURE — 87040 BLOOD CULTURE FOR BACTERIA: CPT | Mod: 91 | Performed by: FAMILY MEDICINE

## 2023-01-01 PROCEDURE — 85379 FIBRIN DEGRADATION QUANT: CPT | Performed by: FAMILY MEDICINE

## 2023-01-01 PROCEDURE — 96365 THER/PROPH/DIAG IV INF INIT: CPT | Mod: XU | Performed by: EMERGENCY MEDICINE

## 2023-01-01 PROCEDURE — 81001 URINALYSIS AUTO W/SCOPE: CPT | Performed by: EMERGENCY MEDICINE

## 2023-01-01 PROCEDURE — 85018 HEMOGLOBIN: CPT | Performed by: FAMILY MEDICINE

## 2023-01-01 PROCEDURE — 97165 OT EVAL LOW COMPLEX 30 MIN: CPT | Mod: GO | Performed by: OCCUPATIONAL THERAPIST

## 2023-01-01 PROCEDURE — 97530 THERAPEUTIC ACTIVITIES: CPT | Mod: GO | Performed by: OCCUPATIONAL THERAPIST

## 2023-01-01 PROCEDURE — C9803 HOPD COVID-19 SPEC COLLECT: HCPCS | Performed by: EMERGENCY MEDICINE

## 2023-01-01 PROCEDURE — 97530 THERAPEUTIC ACTIVITIES: CPT | Mod: GP

## 2023-01-01 PROCEDURE — 84484 ASSAY OF TROPONIN QUANT: CPT | Mod: 91 | Performed by: EMERGENCY MEDICINE

## 2023-01-01 PROCEDURE — 93005 ELECTROCARDIOGRAM TRACING: CPT | Performed by: EMERGENCY MEDICINE

## 2023-01-01 PROCEDURE — 258N000003 HC RX IP 258 OP 636: Performed by: EMERGENCY MEDICINE

## 2023-01-01 PROCEDURE — 99223 1ST HOSP IP/OBS HIGH 75: CPT | Performed by: FAMILY MEDICINE

## 2023-01-01 PROCEDURE — 76705 ECHO EXAM OF ABDOMEN: CPT

## 2023-01-01 PROCEDURE — 97535 SELF CARE MNGMENT TRAINING: CPT | Mod: GO | Performed by: OCCUPATIONAL THERAPIST

## 2023-01-01 PROCEDURE — 85610 PROTHROMBIN TIME: CPT | Performed by: FAMILY MEDICINE

## 2023-01-01 PROCEDURE — 36410 VNPNXR 3YR/> PHY/QHP DX/THER: CPT | Performed by: NURSE ANESTHETIST, CERTIFIED REGISTERED

## 2023-01-01 PROCEDURE — 93010 ELECTROCARDIOGRAM REPORT: CPT | Performed by: INTERNAL MEDICINE

## 2023-01-01 PROCEDURE — 97116 GAIT TRAINING THERAPY: CPT | Mod: GP

## 2023-01-01 PROCEDURE — 96361 HYDRATE IV INFUSION ADD-ON: CPT | Performed by: EMERGENCY MEDICINE

## 2023-01-01 PROCEDURE — 71045 X-RAY EXAM CHEST 1 VIEW: CPT

## 2023-01-01 PROCEDURE — 87086 URINE CULTURE/COLONY COUNT: CPT | Performed by: EMERGENCY MEDICINE

## 2023-01-01 PROCEDURE — 97161 PT EVAL LOW COMPLEX 20 MIN: CPT | Mod: GP

## 2023-01-01 PROCEDURE — 99291 CRITICAL CARE FIRST HOUR: CPT | Mod: 25,CS | Performed by: EMERGENCY MEDICINE

## 2023-01-01 PROCEDURE — 74177 CT ABD & PELVIS W/CONTRAST: CPT

## 2023-01-01 PROCEDURE — 87637 SARSCOV2&INF A&B&RSV AMP PRB: CPT | Performed by: FAMILY MEDICINE

## 2023-01-01 PROCEDURE — 85027 COMPLETE CBC AUTOMATED: CPT | Performed by: FAMILY MEDICINE

## 2023-01-01 PROCEDURE — 85025 COMPLETE CBC W/AUTO DIFF WBC: CPT | Performed by: EMERGENCY MEDICINE

## 2023-01-01 PROCEDURE — 36415 COLL VENOUS BLD VENIPUNCTURE: CPT | Performed by: EMERGENCY MEDICINE

## 2023-01-01 RX ORDER — NALOXONE HYDROCHLORIDE 0.4 MG/ML
0.2 INJECTION, SOLUTION INTRAMUSCULAR; INTRAVENOUS; SUBCUTANEOUS
Status: DISCONTINUED | OUTPATIENT
Start: 2023-01-01 | End: 2023-01-01 | Stop reason: HOSPADM

## 2023-01-01 RX ORDER — IOPAMIDOL 755 MG/ML
75 INJECTION, SOLUTION INTRAVASCULAR ONCE
Status: COMPLETED | OUTPATIENT
Start: 2023-01-01 | End: 2023-01-01

## 2023-01-01 RX ORDER — ENOXAPARIN SODIUM 150 MG/ML
1.5 INJECTION SUBCUTANEOUS EVERY 24 HOURS
Status: DISCONTINUED | OUTPATIENT
Start: 2023-01-01 | End: 2023-01-01 | Stop reason: ALTCHOICE

## 2023-01-01 RX ORDER — NALOXONE HYDROCHLORIDE 0.4 MG/ML
0.4 INJECTION, SOLUTION INTRAMUSCULAR; INTRAVENOUS; SUBCUTANEOUS
Status: DISCONTINUED | OUTPATIENT
Start: 2023-01-01 | End: 2023-01-01 | Stop reason: HOSPADM

## 2023-01-01 RX ORDER — ENOXAPARIN SODIUM 150 MG/ML
140 INJECTION SUBCUTANEOUS ONCE
Status: COMPLETED | OUTPATIENT
Start: 2023-01-01 | End: 2023-01-01

## 2023-01-01 RX ORDER — ACETAMINOPHEN 325 MG/1
650 TABLET ORAL EVERY 6 HOURS PRN
Status: DISCONTINUED | OUTPATIENT
Start: 2023-01-01 | End: 2023-01-01

## 2023-01-01 RX ORDER — METOPROLOL SUCCINATE 50 MG/1
50 TABLET, EXTENDED RELEASE ORAL DAILY
Status: DISCONTINUED | OUTPATIENT
Start: 2023-01-01 | End: 2023-01-01 | Stop reason: HOSPADM

## 2023-01-01 RX ORDER — MAGNESIUM HYDROXIDE/ALUMINUM HYDROXICE/SIMETHICONE 120; 1200; 1200 MG/30ML; MG/30ML; MG/30ML
30 SUSPENSION ORAL EVERY 4 HOURS PRN
Status: DISCONTINUED | OUTPATIENT
Start: 2023-01-01 | End: 2023-01-01 | Stop reason: HOSPADM

## 2023-01-01 RX ORDER — SODIUM CHLORIDE 9 MG/ML
INJECTION, SOLUTION INTRAVENOUS CONTINUOUS
Status: DISCONTINUED | OUTPATIENT
Start: 2023-01-01 | End: 2023-01-01 | Stop reason: HOSPADM

## 2023-01-01 RX ORDER — IOPAMIDOL 755 MG/ML
108 INJECTION, SOLUTION INTRAVASCULAR ONCE
Status: COMPLETED | OUTPATIENT
Start: 2023-01-01 | End: 2023-01-01

## 2023-01-01 RX ORDER — ACETAMINOPHEN 325 MG/1
650 TABLET ORAL 4 TIMES DAILY
Status: DISCONTINUED | OUTPATIENT
Start: 2023-01-01 | End: 2023-01-01 | Stop reason: HOSPADM

## 2023-01-01 RX ORDER — ACETAMINOPHEN 500 MG
500 TABLET ORAL ONCE
Status: COMPLETED | OUTPATIENT
Start: 2023-01-01 | End: 2023-01-01

## 2023-01-01 RX ORDER — DOCUSATE SODIUM 100 MG/1
100 CAPSULE, LIQUID FILLED ORAL DAILY
COMMUNITY
Start: 2023-01-01

## 2023-01-01 RX ORDER — ENOXAPARIN SODIUM 150 MG/ML
1.5 INJECTION SUBCUTANEOUS EVERY 24 HOURS
Status: DISCONTINUED | OUTPATIENT
Start: 2023-01-01 | End: 2023-01-01 | Stop reason: HOSPADM

## 2023-01-01 RX ORDER — ONDANSETRON 2 MG/ML
4 INJECTION INTRAMUSCULAR; INTRAVENOUS EVERY 6 HOURS PRN
Status: DISCONTINUED | OUTPATIENT
Start: 2023-01-01 | End: 2023-01-01 | Stop reason: HOSPADM

## 2023-01-01 RX ORDER — SODIUM CHLORIDE, SODIUM LACTATE, POTASSIUM CHLORIDE, CALCIUM CHLORIDE 600; 310; 30; 20 MG/100ML; MG/100ML; MG/100ML; MG/100ML
125 INJECTION, SOLUTION INTRAVENOUS CONTINUOUS
Status: DISCONTINUED | OUTPATIENT
Start: 2023-01-01 | End: 2023-01-01

## 2023-01-01 RX ORDER — LIDOCAINE 40 MG/G
CREAM TOPICAL
Status: DISCONTINUED | OUTPATIENT
Start: 2023-01-01 | End: 2023-01-01 | Stop reason: HOSPADM

## 2023-01-01 RX ORDER — ONDANSETRON 4 MG/1
4 TABLET, ORALLY DISINTEGRATING ORAL EVERY 6 HOURS PRN
Status: DISCONTINUED | OUTPATIENT
Start: 2023-01-01 | End: 2023-01-01 | Stop reason: HOSPADM

## 2023-01-01 RX ORDER — ONDANSETRON 2 MG/ML
4 INJECTION INTRAMUSCULAR; INTRAVENOUS ONCE
Status: COMPLETED | OUTPATIENT
Start: 2023-01-01 | End: 2023-01-01

## 2023-01-01 RX ORDER — POTASSIUM CHLORIDE 1500 MG/1
40 TABLET, EXTENDED RELEASE ORAL ONCE
Status: COMPLETED | OUTPATIENT
Start: 2023-01-01 | End: 2023-01-01

## 2023-01-01 RX ADMIN — TAZOBACTAM SODIUM AND PIPERACILLIN SODIUM 3.38 G: 375; 3 INJECTION, SOLUTION INTRAVENOUS at 14:29

## 2023-01-01 RX ADMIN — METOPROLOL SUCCINATE 50 MG: 50 TABLET, EXTENDED RELEASE ORAL at 09:59

## 2023-01-01 RX ADMIN — ACETAMINOPHEN 650 MG: 325 TABLET ORAL at 20:13

## 2023-01-01 RX ADMIN — SODIUM CHLORIDE, POTASSIUM CHLORIDE, SODIUM LACTATE AND CALCIUM CHLORIDE 125 ML/HR: 600; 310; 30; 20 INJECTION, SOLUTION INTRAVENOUS at 20:23

## 2023-01-01 RX ADMIN — TAZOBACTAM SODIUM AND PIPERACILLIN SODIUM 3.38 G: 375; 3 INJECTION, SOLUTION INTRAVENOUS at 02:31

## 2023-01-01 RX ADMIN — SODIUM CHLORIDE, POTASSIUM CHLORIDE, SODIUM LACTATE AND CALCIUM CHLORIDE 125 ML/HR: 600; 310; 30; 20 INJECTION, SOLUTION INTRAVENOUS at 23:41

## 2023-01-01 RX ADMIN — SODIUM CHLORIDE: 9 INJECTION, SOLUTION INTRAVENOUS at 11:47

## 2023-01-01 RX ADMIN — ACETAMINOPHEN 650 MG: 325 TABLET ORAL at 20:55

## 2023-01-01 RX ADMIN — ENOXAPARIN SODIUM 140 MG: 150 INJECTION SUBCUTANEOUS at 21:20

## 2023-01-01 RX ADMIN — TAZOBACTAM SODIUM AND PIPERACILLIN SODIUM 3.38 G: 375; 3 INJECTION, SOLUTION INTRAVENOUS at 20:58

## 2023-01-01 RX ADMIN — ACETAMINOPHEN 650 MG: 325 TABLET ORAL at 21:24

## 2023-01-01 RX ADMIN — ALUMINUM HYDROXIDE, MAGNESIUM HYDROXIDE, AND SIMETHICONE 30 ML: 200; 200; 20 SUSPENSION ORAL at 02:16

## 2023-01-01 RX ADMIN — ONDANSETRON HYDROCHLORIDE 4 MG: 2 SOLUTION INTRAMUSCULAR; INTRAVENOUS at 05:41

## 2023-01-01 RX ADMIN — ACETAMINOPHEN 650 MG: 325 TABLET ORAL at 07:56

## 2023-01-01 RX ADMIN — ENOXAPARIN SODIUM 135 MG: 150 INJECTION SUBCUTANEOUS at 21:26

## 2023-01-01 RX ADMIN — TAZOBACTAM SODIUM AND PIPERACILLIN SODIUM 3.38 G: 375; 3 INJECTION, SOLUTION INTRAVENOUS at 13:19

## 2023-01-01 RX ADMIN — ENOXAPARIN SODIUM 135 MG: 150 INJECTION SUBCUTANEOUS at 21:20

## 2023-01-01 RX ADMIN — TAZOBACTAM SODIUM AND PIPERACILLIN SODIUM 3.38 G: 375; 3 INJECTION, SOLUTION INTRAVENOUS at 07:56

## 2023-01-01 RX ADMIN — SODIUM CHLORIDE: 9 INJECTION, SOLUTION INTRAVENOUS at 02:09

## 2023-01-01 RX ADMIN — OXYCODONE HYDROCHLORIDE 2.5 MG: 5 TABLET ORAL at 15:08

## 2023-01-01 RX ADMIN — IOPAMIDOL 75 ML: 755 INJECTION, SOLUTION INTRAVENOUS at 18:03

## 2023-01-01 RX ADMIN — SODIUM CHLORIDE, POTASSIUM CHLORIDE, SODIUM LACTATE AND CALCIUM CHLORIDE 125 ML/HR: 600; 310; 30; 20 INJECTION, SOLUTION INTRAVENOUS at 12:19

## 2023-01-01 RX ADMIN — OXYCODONE HYDROCHLORIDE 2.5 MG: 5 TABLET ORAL at 07:56

## 2023-01-01 RX ADMIN — ACETAMINOPHEN 650 MG: 325 TABLET ORAL at 15:08

## 2023-01-01 RX ADMIN — ONDANSETRON HYDROCHLORIDE 4 MG: 2 SOLUTION INTRAMUSCULAR; INTRAVENOUS at 17:49

## 2023-01-01 RX ADMIN — TAZOBACTAM SODIUM AND PIPERACILLIN SODIUM 3.38 G: 375; 3 INJECTION, SOLUTION INTRAVENOUS at 07:51

## 2023-01-01 RX ADMIN — ACETAMINOPHEN 650 MG: 325 TABLET ORAL at 12:12

## 2023-01-01 RX ADMIN — METOPROLOL SUCCINATE 50 MG: 50 TABLET, EXTENDED RELEASE ORAL at 10:54

## 2023-01-01 RX ADMIN — ACETAMINOPHEN 500 MG: 500 TABLET ORAL at 11:00

## 2023-01-01 RX ADMIN — SODIUM CHLORIDE, POTASSIUM CHLORIDE, SODIUM LACTATE AND CALCIUM CHLORIDE 125 ML/HR: 600; 310; 30; 20 INJECTION, SOLUTION INTRAVENOUS at 03:58

## 2023-01-01 RX ADMIN — OXYCODONE HYDROCHLORIDE 2.5 MG: 5 TABLET ORAL at 21:24

## 2023-01-01 RX ADMIN — OXYCODONE HYDROCHLORIDE 2.5 MG: 5 TABLET ORAL at 11:47

## 2023-01-01 RX ADMIN — ACETAMINOPHEN 650 MG: 325 TABLET ORAL at 04:53

## 2023-01-01 RX ADMIN — TAZOBACTAM SODIUM AND PIPERACILLIN SODIUM 3.38 G: 375; 3 INJECTION, SOLUTION INTRAVENOUS at 07:55

## 2023-01-01 RX ADMIN — TAZOBACTAM SODIUM AND PIPERACILLIN SODIUM 3.38 G: 375; 3 INJECTION, SOLUTION INTRAVENOUS at 20:20

## 2023-01-01 RX ADMIN — METOPROLOL SUCCINATE 50 MG: 50 TABLET, EXTENDED RELEASE ORAL at 15:45

## 2023-01-01 RX ADMIN — SODIUM CHLORIDE, POTASSIUM CHLORIDE, SODIUM LACTATE AND CALCIUM CHLORIDE 125 ML/HR: 600; 310; 30; 20 INJECTION, SOLUTION INTRAVENOUS at 16:01

## 2023-01-01 RX ADMIN — TAZOBACTAM SODIUM AND PIPERACILLIN SODIUM 3.38 G: 375; 3 INJECTION, SOLUTION INTRAVENOUS at 02:06

## 2023-01-01 RX ADMIN — IOPAMIDOL 108 ML: 755 INJECTION, SOLUTION INTRAVENOUS at 12:17

## 2023-01-01 RX ADMIN — POTASSIUM CHLORIDE 40 MEQ: 1500 TABLET, EXTENDED RELEASE ORAL at 08:45

## 2023-01-01 RX ADMIN — VANCOMYCIN HYDROCHLORIDE 1250 MG: 1 INJECTION, POWDER, LYOPHILIZED, FOR SOLUTION INTRAVENOUS at 12:19

## 2023-01-01 RX ADMIN — TAZOBACTAM SODIUM AND PIPERACILLIN SODIUM 3.38 G: 375; 3 INJECTION, SOLUTION INTRAVENOUS at 14:48

## 2023-01-01 RX ADMIN — ONDANSETRON 4 MG: 2 INJECTION INTRAMUSCULAR; INTRAVENOUS at 08:20

## 2023-01-01 RX ADMIN — METOPROLOL SUCCINATE 50 MG: 50 TABLET, EXTENDED RELEASE ORAL at 10:38

## 2023-01-01 RX ADMIN — TAZOBACTAM SODIUM AND PIPERACILLIN SODIUM 3.38 G: 375; 3 INJECTION, SOLUTION INTRAVENOUS at 02:09

## 2023-01-01 RX ADMIN — VANCOMYCIN HYDROCHLORIDE 1250 MG: 1 INJECTION, POWDER, LYOPHILIZED, FOR SOLUTION INTRAVENOUS at 13:52

## 2023-01-01 RX ADMIN — SODIUM CHLORIDE, POTASSIUM CHLORIDE, SODIUM LACTATE AND CALCIUM CHLORIDE 1000 ML: 600; 310; 30; 20 INJECTION, SOLUTION INTRAVENOUS at 12:21

## 2023-01-01 RX ADMIN — TAZOBACTAM SODIUM AND PIPERACILLIN SODIUM 3.38 G: 375; 3 INJECTION, SOLUTION INTRAVENOUS at 20:51

## 2023-01-01 RX ADMIN — ALUMINUM HYDROXIDE, MAGNESIUM HYDROXIDE, AND SIMETHICONE 30 ML: 200; 200; 20 SUSPENSION ORAL at 20:19

## 2023-01-01 ASSESSMENT — ACTIVITIES OF DAILY LIVING (ADL)
ADLS_ACUITY_SCORE: 52
ADLS_ACUITY_SCORE: 50
ADLS_ACUITY_SCORE: 49
FALL_HISTORY_WITHIN_LAST_SIX_MONTHS: NO
ADLS_ACUITY_SCORE: 49
ADLS_ACUITY_SCORE: 49
ADLS_ACUITY_SCORE: 46
ADLS_ACUITY_SCORE: 35
TOILETING_ASSISTANCE: TOILETING DIFFICULTY, ASSISTANCE 1 PERSON
CONCENTRATING,_REMEMBERING_OR_MAKING_DECISIONS_DIFFICULTY: YES
ADLS_ACUITY_SCORE: 46
ADLS_ACUITY_SCORE: 46
ADLS_ACUITY_SCORE: 35
TOILETING_MANAGEMENT: ASSISTANCE NEEDED
ADLS_ACUITY_SCORE: 35
ADLS_ACUITY_SCORE: 49
ADLS_ACUITY_SCORE: 41
DIFFICULTY_EATING/SWALLOWING: NO
ADLS_ACUITY_SCORE: 52
ADLS_ACUITY_SCORE: 46
ADLS_ACUITY_SCORE: 49
ADLS_ACUITY_SCORE: 52
WALKING_OR_CLIMBING_STAIRS: AMBULATION DIFFICULTY, DEPENDENT;STAIR CLIMBING DIFFICULTY, DEPENDENT;TRANSFERRING DIFFICULTY, DEPENDENT
ADLS_ACUITY_SCORE: 35
ADLS_ACUITY_SCORE: 52
ADLS_ACUITY_SCORE: 52
ADLS_ACUITY_SCORE: 49
ADLS_ACUITY_SCORE: 46
DRESSING/BATHING_DIFFICULTY: YES
ADLS_ACUITY_SCORE: 52
DOING_ERRANDS_INDEPENDENTLY_DIFFICULTY: YES
CHANGE_IN_FUNCTIONAL_STATUS_SINCE_ONSET_OF_CURRENT_ILLNESS/INJURY: YES
ADLS_ACUITY_SCORE: 41
ADLS_ACUITY_SCORE: 35
ADLS_ACUITY_SCORE: 49
ADLS_ACUITY_SCORE: 49
ADLS_ACUITY_SCORE: 52
DRESSING/BATHING: BATHING DIFFICULTY, ASSISTANCE 1 PERSON
TOILETING_ISSUES: YES
ADLS_ACUITY_SCORE: 52
VISION_MANAGEMENT: GLASSES
EQUIPMENT_CURRENTLY_USED_AT_HOME: WALKER, ROLLING
ADLS_ACUITY_SCORE: 52
ADLS_ACUITY_SCORE: 52
WEAR_GLASSES_OR_BLIND: YES
WALKING_OR_CLIMBING_STAIRS_DIFFICULTY: YES
ADLS_ACUITY_SCORE: 50
ADLS_ACUITY_SCORE: 52
ADLS_ACUITY_SCORE: 49
ADLS_ACUITY_SCORE: 48
ADLS_ACUITY_SCORE: 49

## 2023-02-06 PROBLEM — R50.9 FEBRILE ILLNESS: Status: ACTIVE | Noted: 2023-01-01

## 2023-02-06 PROBLEM — C78.7 MALIGNANT NEOPLASM METASTATIC TO LIVER (H): Status: ACTIVE | Noted: 2023-01-01

## 2023-02-06 PROBLEM — K86.89 PANCREATIC MASS: Status: ACTIVE | Noted: 2023-01-01

## 2023-02-06 NOTE — PROGRESS NOTES

## 2023-02-06 NOTE — ED PROVIDER NOTES
[facility]  Emergency Department Visit Note    Nausea & Vomiting      History of Present Illness     HPI:  Niru Durham is a 89 year old female presenting with abdominal pain, nausea and vomting. This pain is greatest in the right upper quadrant. These symptoms started 2-3 months ago with nausea, abdominal bloating. The nausea has worsened and she is not eating., Vomited started yesterday No diarrhea or constipation. No chest pain, shortness of breath, fever, chills, dysuria, hematuria.   Medications:  Prior to Admission medications    Medication Sig Last Dose Taking? Auth Provider Long Term End Date   acetaminophen (TYLENOL) 500 MG tablet Take 1,000 mg by mouth every 8 hours as needed for pain   Unknown, Entered By History     aspirin 81 MG EC tablet Take 81 mg by mouth daily   Reported, Patient     atorvastatin (LIPITOR) 80 MG tablet Take 1 tablet (80 mg) by mouth daily   Damon Ruffin MD Yes    Cranberry 1000 MG CAPS Take 1 tablet by mouth daily (with lunch)   Reported, Patient     docusate sodium (COLACE) 100 MG capsule Take 1 capsule (100 mg) by mouth 2 times daily   Damon Ruffin MD     furosemide (LASIX) 20 MG tablet Take 1 tablet (20 mg) by mouth daily   Damon Ruffin MD Yes    lisinopril (ZESTRIL) 2.5 MG tablet Take 1 tablet (2.5 mg) by mouth daily   Damon Ruffin MD Yes    metoprolol succinate ER (TOPROL XL) 50 MG 24 hr tablet Take 50 mg by mouth daily   Reported, Patient     nitroGLYcerin (NITROSTAT) 0.4 MG sublingual tablet Place 0.4 mg under the tongue every 5 minutes as needed for chest pain    Reported, Patient     oxybutynin ER (DITROPAN XL) 5 MG 24 hr tablet Take 5 mg by mouth 2 times daily   Reported, Patient     potassium 99 MG TABS Take 1 tablet by mouth daily (with lunch)   Unknown, Entered By History     ticagrelor (BRILINTA) 90 MG tablet Take 1 tablet (90 mg) by mouth 2 times daily   Damon Ruffin MD Yes    vitamin C (ASCORBIC ACID) 1000 MG  TABS Take 1,000 mg by mouth daily   Unknown, Entered By History     pravastatin (PRAVACHOL) 20 MG tablet Take 20 mg by mouth daily (with lunch)   Reported, Patient Yes 9/27/22       Allergies:  Allergies   Allergen Reactions     Codeine Nausea     Naproxen Nausea and Vomiting       Problem List:  Patient Active Problem List   Diagnosis     Bladder instability     Chronic right hip pain     Cognitive impairment     Colonoscopy refused     Coronary atherosclerosis of native coronary artery     Full dentures     Goiter     Gout     Hyperlipidemia     Hypertension     Kyphosis (acquired) (postural)     Lactose intolerance     Nonmelanoma skin cancer     Onychocryptosis     Onychomycosis     Osteopenia     Other urinary incontinence     Urinary tract infection     Unstable angina (H)     Sepsis (H)     NSTEMI (non-ST elevated myocardial infarction) (H)     Pancreatic mass     Febrile illness     Malignant neoplasm metastatic to liver (H)       Past Medical History:  Past Medical History:   Diagnosis Date     Acquired postural kyphosis     spine in spite of estrogen therapy, compression fracture 09/05     Acute myocardial infarction, subendocardial infarction (H)     04/02/2014,JANE x2 rca and lad     Female stress incontinence     No Comments Provided     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 Dr Matthews. Repeat 10 years     ENDOSCOPIC STRIPPING VEIN(S)      1971,Bilateral vein stripping     EXTRACAPSULAR CATARACT EXTRATION WITH INTRAOCULAR LENS IMPLANT      Bilateral cataract surgery with lens  "implant     HYSTERECTOMY TOTAL ABDOMINAL, BILATERAL SALPINGO-OOPHORECTOMY, COMBINED           OTHER SURGICAL HISTORY      , ,190835,DILATION AND CURETTAGE       Social History:  Social History     Tobacco Use     Smoking status: Former     Packs/day: 0.10     Years: 2.00     Pack years: 0.20     Types: Cigarettes     Quit date: 1983     Years since quittin.9     Smokeless tobacco: Never   Substance Use Topics     Alcohol use: No     Drug use: Unknown     Types: Other     Comment: Drug use: No       Review of Systems:  Complete review of systems obtained and pertinent positive and negative findings noted in HPI. Review of systems otherwise negative.    Physical Exam     Vital signs: BP (!) 146/49 (BP Location: Left arm, Patient Position: Semi-Egan's, Cuff Size: Adult Regular)   Pulse 88   Temp (!) 101.5  F (38.6  C) (Tympanic)   Resp 18   Ht 1.575 m (5' 2\")   Wt 84.8 kg (187 lb)   SpO2 96%   BMI 34.20 kg/m      Physical Exam:    General: awake and alert, uncomfortable  HEENT: atraumatic, no scleral injection, no nasal discharge, neck supple  Chest: clear to auscultation bilaterally without wheezes or crackles, non labored respirations, symmetric chest rise  Cardiovascular: regular rate and rhythm, no murmurs or gallops  Abdomen: soft, tender in the RUQ area, no rebound or guarding, nondistended  Extremities: no deformities, edema, or tenderness  Skin: warm, dry, no rashes  Neuro: alert and oriented x 3, moving extremities x 4, ambulates without difficulty      Medical Decision Making & ED Course     Niru Durham is a 89 year old female presenting with abdominal pain. Differential includes gastric reflux, peptic ulcer disease, pancreatitis, retained CBD stone,  diverticulitis, incarcerated hernia, mesenteric ischemia.    ED Course as of 23 1817   Mon 2023   0834 Lactic Acid(!): 2.1  Reassuring, will not give sepsis fluid bolus   0834 WBC(!): 22.7   0834 Absolute " Neutrophils(!): 20.2   0846 HISTORY: CP     COMPARISONS: 9/30/2022.     TECHNIQUE: Single portable view.     FINDINGS: Heart is stable in size. No acute infiltrate or effusion is  seen. Monitor leads are in place.                                                                        IMPRESSION: No acute disease.     MIKE SARMIENTO MD    1001 SARS CoV2 PCR: Negative   1001 Resp Syncytial Virus: Negative   1001 Influenza B: Negative   1001 Influenza A: Negative   1045                                                                  IMPRESSION: Numerous masses throughout the liver parenchyma,  concerning for metastatic malignant disease.   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  Differential diagnosis includes pancreatic ductal carcinoma with  obstruction of the pancreatic duct and common duct. Common duct  measures approximately 16 mm in maximum dimension.     No distinct evidence of metastatic disease to the lung/chest.     This facility minimizes radiation dose by adjusting the mA and/or kV  according to each patient size.        This CT scan was performed using one or more the following dose  reduction techniques:     -Automated exposure control,  -Adjustment of the mA and/or kV according to patient's size, and/or,  -Use of iterative reconstruction technique.     LOIDA HANNA MD          1309 Dr Marsh does think the liver lesions are amenable to US guided biospy. Will talk with hospitalist about admission       I have reviewed the patients ECG, laboratory studies, imaging, and medical records.      Diagnosis & Disposition     Diagnosis:  1. Pancreatic mass    2. Malignant neoplasm metastatic to liver (H)    3.  Febrile illness          Disposition:  Admit    MD Rodrigue Cantu Theresa M, MD  02/06/23 [id number]

## 2023-02-06 NOTE — PROGRESS NOTES
IV Contrast- Discharge Instructions After Your CT Scan      The IV contrast you received today will be filtered from your bloodstream by your kidneys during the next 24 hours and pass from the body in urine.  You will not be aware of this process and your urine will not change in color.  To help this process you should drink at least 4 additional glasses of water or juice today.  This reduces stress on your kidneys.    Most contrast reactions are immediate.  Should you develop symptoms of concern after discharge, contact the department at the number below.  After hours you should contact your personal physician.  If you develop breathing distress or wheezing, call 911.      1.  Has the patient had a previous reaction to IV contrast? no    2.  Does the patient have kidney disease? no    3.  Is the patient on dialysis? no    If YES to any of these questions, exam will be reviewed with a Radiologist before administering contrast.    Patient to be re-scanned to r/o PE. Per rad (Dr. Bee), okay to use an additional 75-80 mL of contrast for scan.    Kobe Robertson on 2/6/2023 at 5:36 PM

## 2023-02-06 NOTE — PHARMACY-ADMISSION MEDICATION HISTORY
Pharmacy -- Admission Medication Reconciliation    Prior to admission (PTA) medications were reviewed and the patient's PTA medication list was updated.    Sources Consulted: chart review, sure scripts, patient interview     The reliability of this Medication Reconciliation is: Reliability: Reliable    The following significant changes were made:    Removed:    Brilinta: per cardiology note from 11/30/23 was discontinued    Updated:    Docusate to daily    Added:    Omeprazole    MOM    In addition, the patient's allergies were reviewed with the patient and updated as follows:   Allergies: Codeine and Naproxen    The pharmacist has reviewed with the patient that all personal medications should be removed from the building or locked in the belongings safe.  Patient shall only take medications ordered by the physician and administered by the nursing staff.       Medication barriers identified: no Rx insurance coverage per patient; pays cash but affordable    Medication adherence concerns: none noted    Understanding of emergency medications: has nitroglycerin if needed    Medication Affordability:  yes    Erum Woodard [facility], 2/6/2023,  4:33 PM

## 2023-02-06 NOTE — ED TRIAGE NOTES
Patient to ER via EMS from home alone with c/o n/v off and on for several weeks. Last emesis during night. Fentanyl 50 mcg and Zofran 4 mg given en route. Patient frequently belching. MD at bedside        counseled on smoking cessation  Nicotine patch ordered counseled on smoking cessation for 5 min  Nicotine patch ordered

## 2023-02-06 NOTE — PROGRESS NOTES
Admit to med surg from ER about 1430 with DX of pancreatic mass.  Denies pain.  Vanco IV running.  Alert and oriented but forgetful.  Daughter, Peg, here.

## 2023-02-06 NOTE — H&P
[facility]    History and Physical - Hospitalist Service       Date of Admission:  2/6/2023    Assessment & Plan      Niru Durham is a 89 year old female admitted on 2/6/2023. She presents with nausea vomiting and abdominal pain.  New findings on imaging concerning for metastatic malignancy.  Likely primary pancreatic with mets to liver.    Nausea, vomiting, abdominal pain.  Imaging concerning for metastatic pancreatic cancer which was not known to patient prior to this admission.  Does not appear to have cholecystitis or urinary tract infection.  No pulmonary source of infection.  However also has fever, tachycardia elevated LFTs and elevated lactate.  She was given an IV fluid bolus in the emergency room.  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  Common duct was 16 mm.  -Admit to medical floor  -Continue IV fluids  -Repeat lactate in 90 minutes  -Continue broad-spectrum antibiotics until blood cultures result in 48 hours. Zosyn. Repeat procal in AM  -regular diet as tolerated. Start with clear liquids and advance.   -hold home ASA. She has been off brilinta. Will discuss with IR possibility of biopsy while in the hospital.   -anti-emetics and pain control. Patient declined pain medication at this time.   -she normally ambulates independently with a walker. Family to bring walker in   -PT/OT    Fever, tachycardia, elevated LFTs and Elevated lactate.  Got antibiotics in the emergency room, no clear source of infection. Symptoms could be from malignancy.  High risk for pulmonary embolism based on metastatic cancer.  Discussed CT angiogram imaging with patient.  She is open to this if needed.  -Check D-dimer.  Likely will be elevated.  CT angiogram if D-dimer is elevated. Influenza A/B, COVID and RSV negative.  "  -continue antibiotics for 48 hours  -check procalcitonin  -IVF    Acute hypoxic respiratory failure. New. POA.   -CTA ordered. D dimer pending.          Diet: Combination Diet Clear Liquid  DVT Prophylaxis: Pneumatic Compression Devices. Will start lovenox and hold if plan for biopsy.   Scott Catheter: Not present  Lines: None     Cardiac Monitoring: None  Code Status: No CPR- Do NOT Intubate    Clinically Significant Risk Factors Present on Admission                # Drug Induced Platelet Defect: home medication list includes an antiplatelet medication   # Hypertension: home medication list includes antihypertensive(s)  # Chronic systolic heart failure: echo within the past year with EF <40%      # Obesity: Estimated body mass index is 34.2 kg/m  as calculated from the following:    Height as of this encounter: 1.575 m (5' 2\").    Weight as of this encounter: 84.8 kg (187 lb).           Disposition Plan      Expected Discharge Date: 02/08/2023                  Yoselyn Zuñiga DO  Hospitalist Service  [facility]  Securely message with [employer] (more info)  Text page via [employer] Paging/Directory     ______________________________________________________________________    Chief Complaint   Nausea vomiting and abdominal pain.    History is obtained from the patient    History of Present Illness   Niru Durham is a 89 year old female who presents with nausea vomiting and abdominal pain.  Patient has had symptoms on and off for at least the last 1 to 2 months.  She thought she was maybe having a \"flu bug\".  Recently discontinued on her Brilinta per discussion with cardiology.  Today was having increasing abdominal epigastric pain.  This was associated with nausea and vomiting.  She has had fevers on and off as well.  No chest pain or shortness of breath.  However has a new oxygen requirement here since arriving to the hospital.  CT imaging was done and was concerning for new metastatic disease. "  This is a new diagnosis for patient.  Family is at bedside.  She is open to getting a biopsy to help direct management.  She would like to be a DNR/DNI.  She has not been able to eat or drink much by mouth due to the nausea and vomiting.  She has not required anything stronger than Tylenol for the pain.  She has not had any dysuria hematuria urinary frequency or change in stool such as diarrhea or bloody stool.      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 Dr Matthews. Repeat 10 years     ENDOSCOPIC STRIPPING VEIN(S)      1971,Bilateral vein stripping     EXTRACAPSULAR CATARACT EXTRATION WITH INTRAOCULAR LENS IMPLANT      Bilateral cataract surgery with lens implant     HYSTERECTOMY TOTAL ABDOMINAL, BILATERAL SALPINGO-OOPHORECTOMY, COMBINED      1994     OTHER SURGICAL HISTORY      1982, 1994,[id number],DILATION AND CURETTAGE       Prior to Admission Medications   Prior to Admission Medications   Prescriptions Last Dose Informant Patient Reported? Taking?   Cranberry 1000 MG CAPS  Self Yes No   Sig: Take 1 tablet by mouth daily (with lunch)   acetaminophen (TYLENOL) 500 MG tablet  Self Yes No   Sig: Take 1,000 mg by mouth every 8 hours as needed for pain   aspirin 81 MG EC tablet  Self Yes  No   Sig: Take 81 mg by mouth daily   atorvastatin (LIPITOR) 80 MG tablet   No No   Sig: Take 1 tablet (80 mg) by mouth daily   docusate sodium (COLACE) 100 MG capsule   No No   Sig: Take 1 capsule (100 mg) by mouth 2 times daily   furosemide (LASIX) 20 MG tablet   No No   Sig: Take 1 tablet (20 mg) by mouth daily   lisinopril (ZESTRIL) 2.5 MG tablet   No No   Sig: Take 1 tablet (2.5 mg) by mouth daily   metoprolol succinate ER (TOPROL XL) 50 MG 24 hr tablet  Self Yes No   Sig: Take 50 mg by mouth daily   nitroGLYcerin (NITROSTAT) 0.4 MG sublingual tablet  Self Yes No   Sig: Place 0.4 mg under the tongue every 5 minutes as needed for chest pain    oxybutynin ER (DITROPAN XL) 5 MG 24 hr tablet  Self Yes No   Sig: Take 5 mg by mouth 2 times daily   potassium 99 MG TABS  Self Yes No   Sig: Take 1 tablet by mouth daily (with lunch)   ticagrelor (BRILINTA) 90 MG tablet   No No   Sig: Take 1 tablet (90 mg) by mouth 2 times daily   vitamin C (ASCORBIC ACID) 1000 MG TABS  Self Yes No   Sig: Take 1,000 mg by mouth daily      Facility-Administered Medications: None        Review of Systems    The 10 point Review of Systems is negative other than noted in the HPI or here.     Allergies   Allergies   Allergen Reactions     Codeine Nausea     Naproxen Nausea and Vomiting        Physical Exam   Vital Signs: Temp: 100.1  F (37.8  C) Temp src: Tympanic BP: 138/67 Pulse: 98   Resp: 20 SpO2: 94 % O2 Device: Nasal cannula Oxygen Delivery: 2 LPM  Weight: 187 lbs 0 oz    General Appearance: Awake and alert.  Appears well for stated age.  Eyes: Extraocular muscle intact.  Lids normal.  Nonicteric.  HEENT: Normocephalic, atraumatic.  Moist mucous membranes.  Respiratory: Diminished on the right otherwise clear.  Cardiovascular: Regular rate.  GI: Abdomen is soft.  Liver is palpable 2 cm below the ribs.  Skin: Warm and dry.  No rashes, bruising or petechiae  Musculoskeletal: Moves arms and legs equally and normally  Neurologic: No focal  deficits  Psychiatric: Appropriate affect and insight.    Medical Decision Making   75 MINUTES SPENT BY ME on the date of service doing chart review, history, exam, documentation & further activities per the note.  NOTE(S)/MEDICAL RECORDS REVIEWED over the past 24 hours: 1  Tests ORDERED & REVIEWED in the past 24 hours:  - CMP  - CBC  - Coags/INR  - Ddimer  - Lactic Acid  - LFTs  - Lipase  - Procalcitonin      Data     I have personally reviewed the following data over the past 24 hrs:    22.7 (H)  \   12.5   / 379     135 (L) 96 (L) 19.9 /  135 (H)   3.9 25 0.76 \       ALT: 97 (H) AST: 229 (H) AP: 239 (H) TBILI: 0.8   ALB: 3.9 TOT PROTEIN: 8.0 LIPASE: 69 (H)       Trop: 19 (H) BNP: 435       Procal: 0.44 (H) CRP: N/A Lactic Acid: 1.9         Imaging results reviewed over the past 24 hrs:   Recent Results (from the past 24 hour(s))   XR Chest Port 1 View    Narrative    PROCEDURE: XR CHEST PORT 1 VIEW 2/6/2023 8:34 AM    HISTORY: CP    COMPARISONS: 9/30/2022.    TECHNIQUE: Single portable view.    FINDINGS: Heart is stable in size. No acute infiltrate or effusion is  seen. Monitor leads are in place.         Impression    IMPRESSION: No acute disease.    MIKE SARMIENTO MD         SYSTEM ID:  [id number]   US Abdomen Limited    Narrative    PROCEDURES: US ABDOMEN LIMITED    HISTORY: Female, age 89 years, abdominal pain and vomiting, concern  for acute cholecystitis    TECHNIQUE: Ultrasound of the upper abdomen was performed.    COMPARISON: No relevant prior imaging.     FINDINGS:    LIVER: There are numerous well-circumscribed masses throughout the  liver parenchyma, concerning for metastatic malignant disease.    GALLBLADDER: Surgically absent.    Common bile duct diameter is not visualized.    ABDOMINAL AORTA AND IVC: The visualized portions of the abdominal  aorta are unremarkable.    PANCREAS:The visualized portions of the pancreas are normal.    RIGHT KIDNEY: The right kidney is normal. The right kidney  measures  9.6 centimeters in length.    OTHER: There is no free fluid in the upper abdomen.      Impression    IMPRESSION: Numerous masses throughout the liver parenchyma,  concerning for metastatic malignant disease.    LOIDA HANNA MD         SYSTEM ID:  [id number]   CT Chest/Abdomen/Pelvis w Contrast    Narrative    CT CHEST/ABDOMEN/PELVIS W CONTRAST    CLINICAL HISTORY: Female, age 89 years, likely liver mets with no  known cancer;    Comparison:  Abdominal ultrasound 2/6/2023    TECHNIQUE:  CT was performed of the chest, abdomen and pelvis  after  the administration of IV  contrast.   Axial; sagittal and coronal MIP images were reviewed.    FINDINGS:  Chest CT:   The lungs demonstrate scattered areas of atelectasis and perhaps  scarring. There is no evidence of well-defined nodule. Very mild  centrilobular emphysema is seen in the lung apices/upper lobes.    The mediastinal and axillary lymph nodes are normal in size and  number. Heart and great vessels demonstrate no acute abnormality.  Thyroid gland esophagus are grossly normal.    Abdomen/Pelvis CT:  Stomach and duodenum: Unremarkable.    Liver: There are numerous peripherally enhancing, low dense mass is  seen throughout the liver parenchyma. The largest seen posteriorly  within the right lobe measuring approximately 6 cm in maximal  dimension.    Gallbladder: Surgically absent. The common duct is distended with  attaining a maximum diameter of approximately 16 mm.    Spleen: Unremarkable.    Pancreas: There is atrophic change of the body and tail of the  pancreas is marked dilatation the pancreatic duct. There is a multiply  cystic mass seen within the head and uncinate process of the pancreas  with a 2 cm solid component.     Adrenal glands: Unremarkable.    Kidneys: Low dense cortical lesions are too small to characterize  however, likely represent cortical cysts. The ureters: Visualized  portions are unremarkable.    Urinary bladder:  Unremarkable.    Large and small bowel: Moderate volume of stool within the colon. No  evidence of acute abnormality.    The appendix is not seen. There are no secondary signs of  appendicitis.    There are number of enlarged and normal-sized lymph nodes throughout  the retroperitoneum of the upper abdomen including a 2.3 x 1.2 cm  nodular lymph node abutting the right lateral aspect of the superior  mesenteric artery. There is also mild fat stranding within the  mesentery.    The abdominal aorta and inferior vena cava demonstrate no acute  abnormality.    Bony structures: Degenerative changes are seen at the symphysis pubis  as well as within the SI joints and throughout the lumbar spine. There  is no distinct evidence of destructive bony lesion.        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  Differential diagnosis includes pancreatic ductal carcinoma with  obstruction of the pancreatic duct and common duct. Common duct  measures approximately 16 mm in maximum dimension.    No distinct evidence of metastatic disease to the lung/chest.    This facility minimizes radiation dose by adjusting the mA and/or kV  according to each patient size.      This CT scan was performed using one or more the following dose  reduction techniques:    -Automated exposure control,  -Adjustment of the mA and/or kV according to patient's size, and/or,  -Use of iterative reconstruction technique.    LOIDA HANNA MD         SYSTEM ID:  [id number]

## 2023-02-07 NOTE — PROVIDER NOTIFICATION
02/06/23 1900   Initial Information   Patient Belongings remains with patient   Patient Belongings Remaining with Patient clothing   Did you bring any home meds/supplements to the hospital?  No     [facility] will make every effort per our policy to help keep your items safe while in the hospital.  If you choose to keep any items at the bedside, we cannot be held responsible for any items that are lost or broken.      List items sent to safe: none    I have reviewed my belongings list on admission and verify that it is correct.     Patient signature_______________________________  Date/Time_____________________    2nd Staff person if patient unable to sign __________________________  Date/Time ______________________      I have received all my belongings noted above at discharge.    Patient signature________________________________  Date/Time  __________________________

## 2023-02-07 NOTE — PROGRESS NOTES
Lactic protocols was ordered.  Denies pain.        SAFETY CHECKLIST  ID Bands and Risk clasps correct and in place (DNR, Fall risk, Allergy, Latex, Limb):  Yes  All Lines Reconciled and labeled correctly: Yes  Whiteboard updated:Yes  Environmental interventions: Yes  Verify Tele #:

## 2023-02-07 NOTE — PLAN OF CARE
"Patient admitted for Pancreatic mass, Malignant neoplasm metastatic to liver and Fever. Acute O2 use at 2 LPM. Febrile 102 with APAP and ice packs provided and down to 100.2.  Alert and oriented x 3 with forgetfulness. Denies pain. Denies nausea. RLE red, warm and outlined. Patient will only lay on her right side. When repositioned to her back or left side she rolls herself back to right side. Assist 2 for bed mobility, non ambulatory at this time. Incontinent of bladder with purwick in place. [id number].     Problem: Plan of Care - These are the overarching goals to be used throughout the patient stay.    Goal: Plan of Care Review  Description: The Plan of Care Review/Shift note should be completed every shift.  The Outcome Evaluation is a brief statement about your assessment that the patient is improving, declining, or no change.  This information will be displayed automatically on your shift note.  Outcome: Not Progressing  Goal: Patient-Specific Goal (Individualized)  Description: You can add care plan individualizations to a care plan. Examples of Individualization might be:  \"Parent requests to be called daily at 9am for status\", \"I have a hard time hearing out of my right ear\", or \"Do not touch me to wake me up as it startles me\".  Outcome: Not Progressing  Goal: Absence of Hospital-Acquired Illness or Injury  Outcome: Not Progressing  Intervention: Identify and Manage Fall Risk  Recent Flowsheet Documentation  Taken 2/7/2023 0127 by Destiny Darby RN  Safety Promotion/Fall Prevention:    activity supervised    bed alarm on    safety round/check completed    assistive device/personal items within reach    clutter free environment maintained    fall prevention program maintained    increased rounding and observation    increase visualization of patient    patient and family education    room near nurse's station    supervised activity    treat reversible contributory factors    treat underlying " cause  Taken 2/6/2023 2117 by Destiny Darby RN  Safety Promotion/Fall Prevention:    activity supervised    bed alarm on    safety round/check completed    assistive device/personal items within reach    clutter free environment maintained    fall prevention program maintained    increased rounding and observation    increase visualization of patient    patient and family education    room near nurse's station    supervised activity    treat reversible contributory factors    treat underlying cause  Intervention: Prevent and Manage VTE (Venous Thromboembolism) Risk  Recent Flowsheet Documentation  Taken 2/7/2023 0127 by Destiny Darby RN  VTE Prevention/Management: (Lovenox started)    SCDs (sequential compression devices) off    patient refused intervention  Taken 2/6/2023 2117 by Destiny Darby RN  VTE Prevention/Management: (Lovenox started)    SCDs (sequential compression devices) off    patient refused intervention  Goal: Optimal Comfort and Wellbeing  Outcome: Not Progressing  Goal: Readiness for Transition of Care  Outcome: Not Progressing     Problem: Gas Exchange Impaired  Goal: Optimal Gas Exchange  Outcome: Not Progressing     Problem: Nausea and Vomiting  Goal: Nausea and Vomiting Relief  Outcome: Not Progressing     Problem: Pain Acute  Goal: Optimal Pain Control and Function  Outcome: Not Progressing  Intervention: Prevent or Manage Pain  Recent Flowsheet Documentation  Taken 2/7/2023 0127 by Destiny Darby RN  Medication Review/Management: medications reviewed  Taken 2/6/2023 2117 by Destiny Darby RN  Medication Review/Management: medications reviewed   Goal Outcome Evaluation:

## 2023-02-07 NOTE — DISCHARGE INSTRUCTIONS
Follow up with  on 2/13 at 1245 in the [facility]. Please bring discharge papers and medication bottles

## 2023-02-07 NOTE — PROGRESS NOTES
02/07/23 1048   Appointment Info   Signing Clinician's Name / Credentials (PT) Josemanuel Braun MPT   Living Environment   People in Home alone   Current Living Arrangements house   Home Accessibility no concerns   Transportation Anticipated family or friend will provide;health plan transportation   Self-Care   Usual Activity Tolerance moderate   Current Activity Tolerance fair   Equipment Currently Used at Home walker, rolling   Fall history within last six months yes   Number of times patient has fallen within last six months   (mulitple per patient's report)   General Information   Referring Physician Zara   Patient/Family Therapy Goals Statement (PT) possible short term rehab   Existing Precautions/Restrictions fall;oxygen therapy device and L/min   Weight-Bearing Status - LLE full weight-bearing   Weight-Bearing Status - RLE full weight-bearing   Cognition   Affect/Mental Status (Cognition) WFL   Orientation Status (Cognition) oriented x 4   Follows Commands (Cognition) WFL   Safety Deficit (Cognition) impulsivity   Memory Deficit (Cognition) minimal deficit   Pain Assessment   Patient Currently in Pain No   Integumentary/Edema   Integumentary/Edema Comments mild lower leg edema noted   Posture    Posture Forward head position;Protracted shoulders   Range of Motion (ROM)   Range of Motion ROM is WFL   Strength (Manual Muscle Testing)   Strength (Manual Muscle Testing) strength is WFL   Strength Comments however, patient fatigues with activity   Bed Mobility   Impairments Contributing to Impaired Bed Mobility decreased strength   Comment, (Bed Mobility) moderate assist of 1-2   Transfers   Impairments Contributing to Impaired Transfers decreased strength   Comment, (Transfers) moderate to minimal assist for sit to stand; pivot with minimal assist of 1-2 with 4ww   Gait/Stairs (Locomotion)   Hyde Level (Gait) minimum assist (75% patient effort)   Assistive Device (Gait) walker, 4-wheeled   Distance in  Feet 5-6   Pattern (Gait) 3-point   Comment, (Gait/Stairs) fatigue limiting gait tolerance and stability   Balance   Balance Comments fair with 4ww   Sensory Examination   Sensory Perception Comments appears WFL   Coordination   Coordination no deficits were identified   Muscle Tone   Muscle Tone no deficits were identified   Clinical Impression   Criteria for Skilled Therapeutic Intervention Yes, treatment indicated   PT Diagnosis (PT) impaired mobility   Influenced by the following impairments fatigue and weakness   Functional limitations due to impairments activity/gait tolerance and stability   Clinical Presentation (PT Evaluation Complexity) Stable/Uncomplicated   Clinical Decision Making (Complexity) low complexity   Planned Therapy Interventions (PT) bed mobility training;gait training;transfer training   Anticipated Equipment Needs at Discharge (PT) walker, rolling   Risk & Benefits of therapy have been explained evaluation/treatment results reviewed;patient;daughter   PT Total Evaluation Time   PT Eval, Low Complexity Minutes (60851) 15   Physical Therapy Goals   PT Frequency Daily   PT Predicted Duration/Target Date for Goal Attainment 02/11/23   PT Goals Bed Mobility;Transfers;Gait   PT: Bed Mobility Supervision/stand-by assist   PT: Transfers Supervision/stand-by assist;Assistive device   PT: Gait Supervision/stand-by assist;Rolling walker;50 feet   Interventions   Interventions Quick Adds Gait Training;Therapeutic Activity   PT Discharge Planning   PT Plan continue PT   PT Discharge Recommendation (DC Rec) Transitional Care Facility   PT Rationale for DC Rec to promote strength, stability and safe mobility   PT Brief overview of current status pleasant patient requiring moderate to minimal assist for mobilities using a 4ww for gait activities, patient using supplemental O2;  she will benefit from continued PT to promote her highest level of functional activity/gait tolerance allowing her to return to  home environment   Total Session Time   Total Session Time (sum of timed and untimed services) 15

## 2023-02-07 NOTE — PROGRESS NOTES
:     It was mentioned that patient is agreeable to going to [facility] for short term rehab. A referral has been sent. Patient has been offered a bed at [facility] but will need prior authorization from insurance.     Pt/family was given the Medicare Compare list for SNF, with associated star ratings to assist with choice for referrals/discharge planning Yes    Education was given to pt/family that star ratings are updated/maintained by Medicare and can be reviewed by visiting www.medicare.gov Yes     will continue to follow for discharge planning needs.     JAYLEEN Cooper on 2/7/2023 at 1:11 PM

## 2023-02-07 NOTE — PHARMACY-VANCOMYCIN DOSING SERVICE
"Pharmacy Vancomycin Note  Date of Service 2023  Patient's  1931   89 year old, female    Indication: Sepsis  Day of Therapy: 2  Current vancomycin regimen:  1500 mg IV q24h  Current vancomycin monitoring method: AUC  Current vancomycin therapeutic monitoring goal: 400-600 mg*h/L    [employer]ight[employer] Prediction of Current Vancomycin Regimen  Loading dose: N/A  Regimen: 1500 mg IV every 24 hours.  Start time: 07:38 on 2023  Exposure target: AUC24 (range)400-600 mg/L.hr   AUC24,ss: 578 mg/L.hr  Probability of AUC24 > 400: 86 %  Ctrough,ss: 17.7 mg/L  Probability of Ctrough,ss > 20: 39 %  Probability of nephrotoxicity (Lodise JUAQUIN ): 14 %      Current estimated CrCl = Estimated Creatinine Clearance: 43.1 mL/min (based on SCr of 0.86 mg/dL).    Creatinine for last 3 days  2023:  8:24 AM Creatinine 0.76 mg/dL  2023:  6:45 AM Creatinine 0.86 mg/dL    Recent Vancomycin Levels (past 3 days)  No results found for requested labs within last 72 hours.    Vancomycin IV Administrations (past 72 hours)                   vancomycin (VANCOCIN) 1,250 mg in 0.9% NaCl 250 mL intermittent infusion ()  Restarted 23 1419     1,250 mg New Bag  1352                Nephrotoxins and other renal medications (From now, onward)    Start     Dose/Rate Route Frequency Ordered Stop    23 1400  vancomycin (VANCOCIN) 1,250 mg in 0.9% NaCl 250 mL intermittent infusion         1,250 mg  over 90 Minutes Intravenous EVERY 24 HOURS 23 0740      23 2000  piperacillin-tazobactam (ZOSYN) infusion 3.375 g        Note to Pharmacy: For [facility], [facility] and [facility]: For Zosyn-naive patients, use the \"Zosyn initial dose + extended infusion\" order panel.    3.375 g  100 mL/hr over 30 Minutes Intravenous EVERY 6 HOURS 23 1459               Contrast Orders - past 72 hours (72h ago, onward)    Start     Dose/Rate Route Frequency Stop    23 1800  iopamidol (ISOVUE-370) solution 75 mL         75 mL Intravenous ONCE " 02/06/23 1803    02/06/23 1150  iopamidol (ISOVUE-370) solution 108 mL         108 mL Intravenous ONCE 02/06/23 1217          Interpretation of levels and current regimen:  Vancomycin level is reflective of -600    Has serum creatinine changed greater than 50% in last 72 hours: No    Urine output:  good urine output    Renal Function: Stable    Insight[employer] Prediction of Planned New Vancomycin Regimen  Loading dose: N/A  Regimen: 1250 mg IV every 24 hours.  Start time: 07:38 on 02/07/2023  Exposure target: AUC24 (range)400-600 mg/L.hr   AUC24,ss: 485 mg/L.hr  Probability of AUC24 > 400: 71 %  Ctrough,ss: 14.8 mg/L  Probability of Ctrough,ss > 20: 23 %  Probability of nephrotoxicity (Lodise JUAQUIN 2009): 10 %      Plan:  1. Decrease Dose to 1250 mg IV Q24H.  2. Vancomycin monitoring method: AUC  3. Vancomycin therapeutic monitoring goal: 400-600 mg*h/L  4. Pharmacy will check vancomycin levels as appropriate in 1-3 Days.  5. Serum creatinine levels will be ordered daily for the first week of therapy and at least twice weekly for subsequent weeks.    Ritu Maguire [facility]

## 2023-02-07 NOTE — PHARMACY-VANCOMYCIN DOSING SERVICE
"Pharmacy Vancomycin Initial Note  Date of Service 2023  Patient's  1931  89 year old, female    Indication: Sepsis    Current estimated CrCl = Estimated Creatinine Clearance: 48.7 mL/min (based on SCr of 0.76 mg/dL).    Creatinine for last 3 days  2023:  8:24 AM Creatinine 0.76 mg/dL    Recent Vancomycin Level(s) for last 3 days  No results found for requested labs within last 72 hours.      Vancomycin IV Administrations (past 72 hours)                   vancomycin (VANCOCIN) 1,250 mg in 0.9% NaCl 250 mL intermittent infusion ()  Restarted 23 1419     1,250 mg New Bag  1352                Nephrotoxins and other renal medications (From now, onward)    Start     Dose/Rate Route Frequency Ordered Stop    23 1400  vancomycin (VANCOCIN) 1,500 mg in sodium chloride 0.9 % 500 mL intermittent infusion         1,500 mg  over 90 Minutes Intravenous EVERY 24 HOURS 23 2000  piperacillin-tazobactam (ZOSYN) infusion 3.375 g        Note to Pharmacy: For [facility], [facility] and [facility]: For Zosyn-naive patients, use the \"Zosyn initial dose + extended infusion\" order panel.    3.375 g  100 mL/hr over 30 Minutes Intravenous EVERY 6 HOURS 23 1459            Contrast Orders - past 72 hours (72h ago, onward)    Start     Dose/Rate Route Frequency Stop    23 1800  iopamidol (ISOVUE-370) solution 75 mL         75 mL Intravenous ONCE 23 1803    23 1150  iopamidol (ISOVUE-370) solution 108 mL         108 mL Intravenous ONCE 23 1217          InsightRX Prediction of Planned Initial Vancomycin Regimen  Regimen: 1500 mg IV every 24 hours.  Start time: 01:52 on 2023  Exposure target: AUC24 (range)400-600 mg/L.hr   AUC24,ss: 522 mg/L.hr  Probability of AUC24 > 400: 79 %  Ctrough,ss: 15.5 mg/L  Probability of Ctrough,ss > 20: 26 %  Probability of nephrotoxicity (Lodise JUAQUIN ): 11 %        Plan:  1. Start vancomycin  1500 mg IV q24h. (already received 1250 mg x1 in " the ER 2/6/23 @ ~1400)  2. Vancomycin monitoring method: AUC  3. Vancomycin therapeutic monitoring goal: 400-600 mg*h/L  4. Pharmacy will check vancomycin levels as appropriate in 1-3 Days.    5. Serum creatinine levels will be ordered daily for the first week of therapy and at least twice weekly for subsequent weeks.      Ariel Merino RPH

## 2023-02-07 NOTE — PROGRESS NOTES
Lovenox 135mg ordered. 140mg as878uo available now. Spoke with Dr. Zuñiga, verbal order ok to give 140mg times one and resume 135mg tomorrow. [facility] called. [facility] will fix order. 140 mgs of lovenox handed off to Michael MCKEON.

## 2023-02-07 NOTE — PROGRESS NOTES
02/07/23 1042   Appointment Info   Signing Clinician's Name / Credentials (OT) Ivan Briones, OTS; Shelbie Mueller; OTR/L   Living Environment   People in Home alone   Current Living Arrangements house   Home Accessibility no concerns   Transportation Anticipated family or friend will provide   Self-Care   Usual Activity Tolerance moderate   Current Activity Tolerance fair   Regular Exercise No   Equipment Currently Used at Home walker, rolling   Fall history within last six months yes   Number of times patient has fallen within last six months   (Multiple per patient report)   General Information   Referring Physician Yoselyn Ericehn   Cognitive Status Examination   Orientation Status orientation to person, place and time   Affect/Mental Status (Cognitive) WFL   Follows Commands WFL  (Required addittional verbal cues to follow commands.)   Bed Mobility   Bed Mobility supine-sit   Supine-Sit [place] (Bed Mobility) moderate assist (50% patient effort)  (Mod A of 1-2)   Assistive Device (Bed Mobility) bed rails   Transfers   Transfers sit-stand transfer   Sit-Stand Transfer   Sit-Stand [place] (Transfers) 1 person to manage equipment;minimum assist (75% patient effort)   Assistive Device (Sit-Stand Transfers) walker, 4-wheeled   Clinical Impression   Criteria for Skilled Therapeutic Interventions Met (OT) Yes, treatment indicated   OT Diagnosis Pancreatic Mass   Influenced by the following impairments Mobility and self cares   OT Problem List-Impairments impacting ADL activity tolerance impaired;mobility;strength   Assessment of Occupational Performance 1-3 Performance Deficits   Identified Performance Deficits Mobility and self-cares   Planned Therapy Interventions (OT) ADL retraining;strengthening;transfer training   Clinical Decision Making Complexity (OT) low complexity   Risk & Benefits of therapy have been explained risks/benefits reviewed   OT Total Evaluation Time   OT Eval, Low Complexity Minutes  (56477) 15   OT Goals   Therapy Frequency (OT) Daily   OT Predicted Duration/Target Date for Goal Attainment 02/09/23   OT Goals Transfers;Upper Body Bathing;Upper Body Dressing   OT: Upper Body Dressing Supervision/stand-by assist   OT: Upper Body Bathing Supervision/stand-by assist   OT: Transfer Supervision/stand-by assist   Interventions   Interventions Quick Adds Self-Care/Home Management;Therapeutic Activity   Self-Care/Home Management   Self-Care/Home Mgmt/ADL, Compensatory, Meal Prep Minutes (96169) 10   [place] Level (Grooming Training) moderate assist (50% patient effort)  (For shampoo cap and brushing hair)   Assistance (Grooming Training) 1 person assist;set-up required;verbal cues   Therapeutic Activities   Therapeutic Activity Minutes (24172) 20   Symptoms noted during/after treatment dizziness;shortness of breath;fatigue   Treatment Detail/Skilled Intervention Pt sat up at EOB and reported that she had felt dizzy. Pt stood up and ambulated to recliner with four-wheeled walker and Min A of 1-2 for equipment. Pt then transferred back to bed from recliner with four-wheeled walker and Min A of 1-2 for equipment. Pt stated that she no longer felt dizzy, but was fatigued. Pt did require verbal cues for safety when transferring and walking due to impulsivity.   OT Discharge Planning   OT Plan Continue OT while hospitalized   OT Discharge Recommendation (DC Rec) Transitional Care Facility   OT Rationale for DC Rec Pt would benefit from continued therapies to maximize independence and safety in regards to mobility and self-cares to return to prior level of functioning.   OT Brief overview of current status Pt was pleasant and willing to participate in evaluation. Pt was able to ambulate in room with Min 1-2 for equipment. Pt was impulsive when trasnferring and walking, but would listen to verbal cues. Will continue to assess ADLs and self-cares further.   Total Session Time   Timed Code Treatment Minutes  30   Total Session Time (sum of timed and untimed services) 45

## 2023-02-07 NOTE — PROGRESS NOTES
[facility]    Medicine Progress Note - Hospitalist Service    Date of Admission:  2/6/2023    Assessment & Plan      Niru Durham is a 89 year old female admitted on 2/6/2023. She presents with nausea vomiting and abdominal pain.  New findings on imaging concerning for metastatic malignancy.  Likely primary pancreatic with mets to liver.    Nausea, vomiting, abdominal pain.  Imaging concerning for metastatic pancreatic cancer which was not known to patient prior to this admission.  Does not appear to have cholecystitis or urinary tract infection.  No pulmonary source of infection.  However also has fever, tachycardia elevated LFTs and elevated lactate.  She was given an IV fluid bolus in the emergency room.  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  Common duct was 16 mm.  -Continue IV fluids  -Continue broad-spectrum antibiotics until blood cultures result in 48 hours. Zosyn. With +blood culture vanco added back. Continue to follow up final C/S.   -regular diet as tolerated. Start with clear liquids and advance.   -hold home ASA. She has been off brilinta.  -plan for discharge and outpatient biopsy.   -liver US   -anti-emetics and pain control. Patient declined pain medication at this time.   -she normally ambulates independently with a walker. Family to bring walker in   -PT/OT    Fever, tachycardia, elevated LFTs and Elevated lactate.  Got antibiotics in the emergency room, no clear source of infection. Symptoms could be from malignancy. Also CTA +PE.  Influenza A/B, COVID and RSV negative.   -continue antibiotics for 48 hours. 1/4 blood cultures pos thus far  -IVF    Acute hypoxic respiratory failure. New. POA. CT showed small PE.   -lovenox therapeutic dosing. She wants to consider biopsy of the area. Will  "discuss with radiology. Hold transition to DOAC until biopsy timing can be established.       Diet: Combination Diet Clear Liquid    DVT Prophylaxis: Enoxaparin (Lovenox) SQ  Scott Catheter: Not present  Lines: None     Cardiac Monitoring: None  Code Status: No CPR- Do NOT Intubate      Clinically Significant Risk Factors Present on Admission                  # Hypertension: home medication list includes antihypertensive(s)  # Chronic systolic heart failure: echo within the past year with EF <40%      # Obesity: Estimated body mass index is 34.31 kg/m  as calculated from the following:    Height as of this encounter: 1.575 m (5' 2\").    Weight as of this encounter: 85.1 kg (187 lb 9.6 oz).           Disposition Plan      Expected Discharge Date: 02/08/2023                  Yoselyn Zuñiga DO  Hospitalist Service  [facility]  Securely message with [employer] (more info)  Text page via [facility] Paging/Directory   ______________________________________________________________________    Interval History   Feeling better. 1/4 blood cultures resulted positive. Patient would like to eat. Nausea improved. No chest pain    Physical Exam   Vital Signs: Temp: 100.2  F (37.9  C) Temp src: Tympanic BP: (!) 140/57 Pulse: 73   Resp: 18 SpO2: 93 % O2 Device: Nasal cannula Oxygen Delivery: 2 LPM  Weight: 187 lbs 9.6 oz    General Appearance: AAO. Obese. NAD.   Respiratory: CTA B/L   Cardiovascular: RR.   GI: abd soft. Liver palpable below ribs  Skin: warm, dry  Other:      Medical Decision Making     55 MINUTES SPENT BY ME on the date of service doing chart review, history, exam, documentation & further activities per the note.      Data     I have personally reviewed the following data over the past 24 hrs:    19.2 (H)  \   11.2 (L)   / 299     135 (L) 98 13.1 /  114 (H)   3.8 26 0.86 \       ALT: N/A AST: N/A AP: N/A TBILI: N/A   ALB: N/A TOT PROTEIN: N/A LIPASE: N/A       Trop: 19 (H) BNP: N/A       Procal: 1.15 " (H) CRP: N/A Lactic Acid: 1.4       INR:  1.37 (H) PTT:  N/A   D-dimer:  N/A Fibrinogen:  N/A       Imaging results reviewed over the past 24 hrs:   Recent Results (from the past 24 hour(s))   US Abdomen Limited    Narrative    PROCEDURES: US ABDOMEN LIMITED    HISTORY: Female, age 89 years, abdominal pain and vomiting, concern  for acute cholecystitis    TECHNIQUE: Ultrasound of the upper abdomen was performed.    COMPARISON: No relevant prior imaging.     FINDINGS:    LIVER: There are numerous well-circumscribed masses throughout the  liver parenchyma, concerning for metastatic malignant disease.    GALLBLADDER: Surgically absent.    Common bile duct diameter is not visualized.    ABDOMINAL AORTA AND IVC: The visualized portions of the abdominal  aorta are unremarkable.    PANCREAS:The visualized portions of the pancreas are normal.    RIGHT KIDNEY: The right kidney is normal. The right kidney measures  9.6 centimeters in length.    OTHER: There is no free fluid in the upper abdomen.      Impression    IMPRESSION: Numerous masses throughout the liver parenchyma,  concerning for metastatic malignant disease.    LOIDA HANNA MD         SYSTEM ID:  [id number]   CT Chest/Abdomen/Pelvis w Contrast    Narrative    CT CHEST/ABDOMEN/PELVIS W CONTRAST    CLINICAL HISTORY: Female, age 89 years, likely liver mets with no  known cancer;    Comparison:  Abdominal ultrasound 2/6/2023    TECHNIQUE:  CT was performed of the chest, abdomen and pelvis  after  the administration of IV  contrast.   Axial; sagittal and coronal MIP images were reviewed.    FINDINGS:  Chest CT:   The lungs demonstrate scattered areas of atelectasis and perhaps  scarring. There is no evidence of well-defined nodule. Very mild  centrilobular emphysema is seen in the lung apices/upper lobes.    The mediastinal and axillary lymph nodes are normal in size and  number. Heart and great vessels demonstrate no acute abnormality.  Thyroid gland esophagus are  grossly normal.    Abdomen/Pelvis CT:  Stomach and duodenum: Unremarkable.    Liver: There are numerous peripherally enhancing, low dense mass is  seen throughout the liver parenchyma. The largest seen posteriorly  within the right lobe measuring approximately 6 cm in maximal  dimension.    Gallbladder: Surgically absent. The common duct is distended with  attaining a maximum diameter of approximately 16 mm.    Spleen: Unremarkable.    Pancreas: There is atrophic change of the body and tail of the  pancreas is marked dilatation the pancreatic duct. There is a multiply  cystic mass seen within the head and uncinate process of the pancreas  with a 2 cm solid component.     Adrenal glands: Unremarkable.    Kidneys: Low dense cortical lesions are too small to characterize  however, likely represent cortical cysts. The ureters: Visualized  portions are unremarkable.    Urinary bladder: Unremarkable.    Large and small bowel: Moderate volume of stool within the colon. No  evidence of acute abnormality.    The appendix is not seen. There are no secondary signs of  appendicitis.    There are number of enlarged and normal-sized lymph nodes throughout  the retroperitoneum of the upper abdomen including a 2.3 x 1.2 cm  nodular lymph node abutting the right lateral aspect of the superior  mesenteric artery. There is also mild fat stranding within the  mesentery.    The abdominal aorta and inferior vena cava demonstrate no acute  abnormality.    Bony structures: Degenerative changes are seen at the symphysis pubis  as well as within the SI joints and throughout the lumbar spine. There  is no distinct evidence of destructive bony lesion.        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  Differential diagnosis includes pancreatic ductal carcinoma with  obstruction of the pancreatic duct and common duct. Common  duct  measures approximately 16 mm in maximum dimension.    No distinct evidence of metastatic disease to the lung/chest.    This facility minimizes radiation dose by adjusting the mA and/or kV  according to each patient size.      This CT scan was performed using one or more the following dose  reduction techniques:    -Automated exposure control,  -Adjustment of the mA and/or kV according to patient's size, and/or,  -Use of iterative reconstruction technique.    LOIDA HANNA MD         SYSTEM ID:  [id number]   CT Chest Pulmonary Embolism w Contrast    Narrative    CT CHEST PULMONARY EMBOLISM W CONTRAST  2/6/2023 6:15 PM    CLINICAL HISTORY: Female, age 89 years,  Female sex; Not pregnant; No  imaging to r/o PE in the last 24 hours; Pulmonary Embolism Rule-Out  Criteria (PERC) score > 0; Revised Lancaster Score (RG) not >= 11; No  D-dimer result available; D-dimer not ordered;    Comparison:  CT scan chest abdomen pelvis 2/6/2023    TECHNIQUE: CT angiogram was performed of the chest  with IV contrast.   Axial; sagittal and coronal MIP images were reviewed..     FINDINGS:  Chest CT:    Excellent quality CTA demonstrates small filling defect within one of  the segmental arterial branches involving the lateral segment of the  right lower lobe. Otherwise, pulmonary arterial structures are patent.  There is no evidence of impending infarction. Atelectasis and  emphysematous changes are again seen within the lungs.    There is no evidence of pathologic lymph node enlargement.    Visualized portions of the upper abdomen again demonstrate nodular  appearance of liver with numerous low dense lesions. Nodular  thickening also seen within the head of the pancreas.      Bony structures: No acute abnormality.      Impression    IMPRESSION:   Positive for pulmonary embolus. Small filling defect is seen within  the segmental arterial branch of the lateral segment of the right  lower lobe without evidence of impending  infarction.    Lungs demonstrate no evidence evidence of acute abnormality otherwise.    This facility minimizes radiation dose by adjusting the mA and/or kV  according to each patient size.      This CT scan was performed using one or more the following dose  reduction techniques:    -Automated exposure control,  -Adjustment of the mA and/or kV according to patient's size, and/or,  -Use of iterative reconstruction technique.    LOIDA HANNA MD         SYSTEM ID:  [id number]

## 2023-02-08 NOTE — PLAN OF CARE
Goal Outcome Evaluation:  Pt admitted on 2/6 for pancreatic mass. Disoriented to situation, pleasant. Slight pain to right shoulder treated with ice and Tylenol. Lungs are clear. O2 weaned from 2L to 1.5L (acute O2). Intermittently tachypneic. Temps 99.1-102.3.  RLE red, hot to touch, and outlined. Edema to lower extremities. Purewick in place for incontinence. [id number]. IV infusing LR at 125 mL/hr. Received scheduled abx. Not oob this shift. Caty Pereira RN on 2/8/2023 at 5:17 AM      Plan of Care Reviewed With: patient    Overall Patient Progress: no change

## 2023-02-08 NOTE — PLAN OF CARE
"  Problem: Plan of Care - These are the overarching goals to be used throughout the patient stay.    Goal: Plan of Care Review  Description: The Plan of Care Review/Shift note should be completed every shift.  The Outcome Evaluation is a brief statement about your assessment that the patient is improving, declining, or no change.  This information will be displayed automatically on your shift note.  Outcome: Progressing  Goal: Patient-Specific Goal (Individualized)  Description: You can add care plan individualizations to a care plan. Examples of Individualization might be:  \"Parent requests to be called daily at 9am for status\", \"I have a hard time hearing out of my right ear\", or \"Do not touch me to wake me up as it startles me\".  Outcome: Progressing  Goal: Absence of Hospital-Acquired Illness or Injury  Outcome: Progressing  Intervention: Identify and Manage Fall Risk  Recent Flowsheet Documentation  Taken 2/7/2023 1500 by Eladio Linda, RN  Safety Promotion/Fall Prevention:   activity supervised   safety round/check completed   supervised activity  Goal: Optimal Comfort and Wellbeing  Outcome: Progressing  Goal: Readiness for Transition of Care  Outcome: Progressing     Problem: Gas Exchange Impaired  Goal: Optimal Gas Exchange  Outcome: Progressing     Problem: Nausea and Vomiting  Goal: Nausea and Vomiting Relief  Outcome: Progressing     Problem: Pain Acute  Goal: Optimal Pain Control and Function  Outcome: Progressing  Intervention: Prevent or Manage Pain  Recent Flowsheet Documentation  Taken 2/7/2023 1500 by Eladio Linda, RN  Medication Review/Management: medications reviewed   Goal Outcome Evaluation:       Patient on 2L via NC. She is tolerating IV abx, and cellulitis is improving. Patient currently resting comfortably.                 "

## 2023-02-08 NOTE — PROGRESS NOTES
:     Patient is from home alone. Patient has a pending referral at [facility] for STR. Patients insurance requires a prior authorization.      will continue to follow for discharge planning needs.     JAYLEEN Cooper on 2/8/2023 at 12:36 PM

## 2023-02-08 NOTE — PROGRESS NOTES
[facility]    Medicine Progress Note - Hospitalist Service    Date of Admission:  2/6/2023    Assessment & Plan   Niru Durham is a 89 year old female admitted on 2/6/2023. She presents with nausea vomiting and abdominal pain.  New findings on imaging concerning for metastatic malignancy.  Likely primary pancreatic with mets to liver.    Nausea, vomiting, abdominal pain.  Imaging concerning for metastatic pancreatic cancer which was not known to patient prior to this admission.  Does not appear to have cholecystitis or urinary tract infection.  No pulmonary source of infection.  However also has fever, tachycardia elevated LFTs and elevated lactate.  She was given an IV fluid bolus in the emergency room.  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  Common duct was 16 mm.  -Continue IV fluids  -continue zosyn  -scheduled tylenol and pain meds as patient does not ask and would benefit from nursing cares/interventions to help with pain control  -hold home ASA. She has been off brilinta.  -plan for discharge and outpatient biopsy when improving vs hopsice.   -liver US   -anti-emetics and pain control.  -she normally ambulates independently with a walker. Family to bring walker in   -PT/OT  -very poor po intake. restart IVF. Supplements as needed    Fever, tachycardia, elevated LFTs and Elevated lactate.  malignancy. Also CTA +PE.  Influenza A/B, COVID and RSV negative. Blood culture pos for strep dysgalactiae  -continue zosyn  -follow up repeat blood cultures  -IVF  -continue zosyn    Acute hypoxic respiratory failure. New. POA. CT showed small PE. 1/4 blood cultures strep dysgalactiae.  -lovenox therapeutic dosing. She wants to consider biopsy of the area.  Per discussion with radiology and our team here her insurance  "would not cover biopsy.  We will help scheduling outpatient at her usual care facility through [facility].   - Hold transition to DOAC until biopsy timing can be established.    Prognosis likely poor.  Discussed with family.  We will continue to assess daily.  May need to change course of therapy depending on how she does.  Okay to continue with antibiotics and Lovenox for now.       Diet: Regular Diet Adult    DVT Prophylaxis: Enoxaparin (Lovenox) SQ  Scott Catheter: Not present  Lines: None     Cardiac Monitoring: None  Code Status: No CPR- Do NOT Intubate      Clinically Significant Risk Factors                        # Obesity: Estimated body mass index is 35.41 kg/m  as calculated from the following:    Height as of this encounter: 1.575 m (5' 2\").    Weight as of this encounter: 87.8 kg (193 lb 9.6 oz)., PRESENT ON ADMISSION         Disposition Plan             Yoselyn Zuñiga DO  Hospitalist Service  [facility]  Securely message with [employer] (more info)  Text page via [employer] Paging/Directory   ______________________________________________________________________    Interval History   Patient feeling fevers and chills.  Very poor p.o. intake.  Fatigue.  Family at bedside.  Denies nausea or vomiting.  Is not currently complaining of pain but generally under report symptoms.  Again reviewed her prognosis etc. as discussed above.  Family has been very understanding and supportive of patient.    Physical Exam   Vital Signs: Temp: 98.5  F (36.9  C) Temp src: Tympanic BP: (!) 153/68 Pulse: 83   Resp: 23 SpO2: 91 % O2 Device: Nasal cannula Oxygen Delivery: 1.5 LPM  Weight: 193 lbs 9.6 oz    General Appearance: Appears chilled and acutely ill wrapped up in several blankets in the bed.  Respiratory: Clear to auscultation but diminished  Cardiovascular: Regular rate.  GI: Abdomen is soft.  Enlarged liver  Skin: Warm and dry  Other:      Medical Decision Making     60 MINUTES SPENT BY ME on the date of " service doing chart review, history, exam, documentation & further activities per the note.      Data     I have personally reviewed the following data over the past 24 hrs:    N/A  \   N/A   / N/A     N/A N/A N/A /  N/A   3.4 N/A N/A \       Procal: N/A CRP: N/A Lactic Acid: 1.1         Imaging results reviewed over the past 24 hrs:   No results found for this or any previous visit (from the past 24 hour(s)).

## 2023-02-08 NOTE — PLAN OF CARE
Patient is alert and oriented to person place and time. Disoriented to situation. She does have periods of confusion but is able to be redirected. VSS. Denies nausea and SOB. Denies pain to staff but reports pain to daughter and shows visible signs of pain. PRN pain meds were changed to scheduled. LS clear. Does have infrequent, non-productive cough. Patient is using acute O2 at 1.5 LPM to maintain O2 sats >90%. Patient requires max assistance x2 staff with pivot transfers. Purewick in place and working effectively. Will continue to monitor and update MD as appropriate.     Goal Outcome Evaluation:      Plan of Care Reviewed With: patient, child    Overall Patient Progress: no changeOverall Patient Progress: no change

## 2023-02-09 NOTE — PHARMACY - DISCHARGE MEDICATION RECONCILIATION AND EDUCATION
Pharmacy:  Discharge Counseling and Medication Reconciliation    Niru Durham  [address]  [place] [id number] [phone] (home)   89 year old female  PCP: Maria Teresa Richards    Allergies: Codeine and Naproxen    Discharge Counseling:    Pharmacist met with patient (and/or family) today to review the medication portion of the After Visit Summary (with an emphasis on NEW medications) and to address patient's questions/concerns.    Summary of Education: discussed new medications, adverse effects and general instructions  Xarelto starter pack- watch for signs of bleeding, black tarry stool, coffee ground vomiting.  Augmentin- concern for upset stomach and diarrhea. May take with food and use probiotics.    Materials Provided:  MedCounselor sheets printed from Clinical Pharmacology on: Xarleto starter pack, Augmentin    Discharge Medication Reconciliation:    It has been determined that the patient has an adequate supply of medications available or which can be obtained from the patient's preferred pharmacy, which HE/SHE has confirmed as: [facility]. [An updated medication list will be faxed to the patient's pharmacy.]    Thank you for the consult.    Jaime Smith........February 9, 2023 9:17 AM

## 2023-02-09 NOTE — PROGRESS NOTES
[facility] DISCHARGE NOTE    Patient discharged to nursing home at 11:07 AM via wheel chair. Accompanied by other:[facility] [employer] and staff. Discharge instructions reviewed with patient and daughter, opportunity offered to ask questions. Prescriptions sent to patients preferred pharmacy. All belongings sent with patient.    Hira Santillan, RN

## 2023-02-09 NOTE — DISCHARGE SUMMARY
[facility]  Hospitalist Discharge Summary      Date of Admission:  2/6/2023  Date of Discharge:  2/9/2023  Discharging Provider: Yoselyn Zuñiga DO  Discharge Service: Hospitalist Service    Discharge Diagnoses   Nausea, vomiting abdominal pain.  Imaging concerning for metastatic pancreatic cancer new diagnosis, present on admission.  Fever tachycardia elevated LFTs and elevated lactate.  Sepsis.  Present on admission.  Sepsis due to gram positive, strep dysgalactiae.  Bacteremia, strep dysgalactiae.  Present on admission.  Pulmonary embolism.   present on admission.    Follow-ups Needed After Discharge   Follow-up Appointments     Follow Up and recommended labs and tests      Follow up with Nursing home physician.  No follow up labs or test are   needed.             Unresulted Labs Ordered in the Past 30 Days of this Admission     Date and Time Order Name Status Description    2/6/2023  2:58 PM Blood Culture Hand, Right Preliminary     2/6/2023  2:58 PM Blood Culture Arm, Left Preliminary     2/6/2023  8:09 AM Blood Culture Arm, Left Preliminary     2/6/2023  8:09 AM Blood Culture Arm, Left Preliminary       These results will be followed up by PCP, oncology    Discharge Disposition   Discharged to rehabilitation facility  Condition at discharge: [facility]      Hospital Course   Niru Durham is a 89 year old female admitted on 2/6/2023. She presents with nausea vomiting and abdominal pain.  New findings on imaging concerning for metastatic malignancy.  Likely primary pancreatic with mets to liver.    Nausea, vomiting, abdominal pain.  Imaging concerning for metastatic pancreatic cancer which was not known to patient prior to this admission.  Does not appear to have cholecystitis or urinary tract infection.  No pulmonary source of infection.  However also has fever, tachycardia elevated LFTs and elevated lactate.  She was given an IV fluid bolus in the emergency room.  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  Common duct was 16 mm.  Patient had improvement in her symptoms with treatment with Zosyn and IV fluids.  Home aspirin will be discontinued at discharge as she is started on anticoagulation for pulmonary embolism.  She will need a liver ultrasound done outpatient for diagnosis.  Patient and family unlikely to pursue aggressive treatment given her age and comorbidities.  She was previously ambulatory in her home with a walker.  Required PT and OT and had significant weakness and deconditioning and will transfer to skilled rehab at this time.    Fever, tachycardia, elevated LFTs and Elevated lactate.  malignancy. Also CTA +PE.  Influenza A/B, COVID and RSV negative. Blood culture pos for strep dysgalactiae when she was treated with Zosyn.  Will complete course of therapy with Augmentin.  Still spiking some fevers which could be from malignancy.    Acute hypoxic respiratory failure. New. POA. CT showed small PE. 1/4 blood cultures strep dysgalactiae.  As above.  She is discharged on home oxygen.  Wean as able.  Started on Xarelto.    Prognosis likely poor.  Discussed with family.       Consultations This Hospital Stay   PHARMACY TO DOSE VANCO  PHYSICAL THERAPY ADULT IP CONSULT  OCCUPATIONAL THERAPY ADULT IP CONSULT  PHARMACY TO DOSE VANCO  SOCIAL WORK IP CONSULT  PHYSICAL THERAPY ADULT IP CONSULT  OCCUPATIONAL THERAPY ADULT IP CONSULT    Code Status   No CPR- Do NOT Intubate    Time Spent on this Encounter   Yoselyn REARDON DO, personally saw the patient today and spent greater than 30 minutes discharging this patient.       Yoselyn Zuñiga DO  [facility]  [address]  [place] [address]  Phone: [phone]  Fax:  [phone]  ______________________________________________________________________    Physical Exam   Vital Signs: Temp: 100  F (37.8  C) Temp src: Tympanic BP: (!) 166/79 Pulse: 89   Resp: 20 SpO2: 91 % O2 Device: Nasal cannula Oxygen Delivery: 1.5 LPM  Weight: 193 lbs 11.2 oz  General Appearance: Awake, alert.  No acute distress.  Weak deconditioned.  Respiratory: Clear to auscultation bilaterally.  Cardiovascular: Regular rate.  GI: Abdomen is soft.  Liver palpable below the ribs.  Skin: Warm and dry.  Age-related skin changes.  Other:         Primary Care Physician   Maria Teresa Fatima    Discharge Orders      BIOPSY LIVER NEEDLE PERCUTANEOUS     Adult Oncology/Hematology  Referral      General info for SNF    Length of Stay Estimate: Short Term Care: Estimated # of Days <30  Condition at Discharge: Stable  Level of care:skilled   Rehabilitation Potential: Good  Admission H&P remains valid and up-to-date: Yes  Recent Chemotherapy: N/A  Use Nursing Home Standing Orders: Yes     Mantoux instructions    Give two-step Mantoux (PPD) Per Facility Policy Yes     Follow Up and recommended labs and tests    Follow up with Nursing home physician.  No follow up labs or test are needed.     Reason for your hospital stay    Probable metastatic cancer.     Activity - Up with assistive device     Physical Therapy Adult Consult    Evaluate and treat as clinically indicated.    Reason:  weakness     Occupational Therapy Adult Consult    Evaluate and treat as clinically indicated.    Reason:  help with ADLs     Oxygen (SNF/TCU) Discharge     Fall precautions     Diet    Follow this diet upon discharge: Orders Placed This Encounter      Regular Diet Adult       Significant Results and Procedures   Most Recent 3 CBC's:Recent Labs   Lab Test 02/09/23  0557 02/07/23  0645 02/06/23  0824   WBC 19.5* 19.2* 22.7*   HGB 10.8* 11.2* 12.5   MCV 91 86 87    299 379     Most Recent 3 BMP's:Recent Labs   Lab Test  02/09/23  0557 02/08/23  1217 02/08/23  0612 02/07/23  0645 02/06/23  0824     --   --  135* 135*   POTASSIUM 3.9  3.8 4.1 3.4 3.8 3.9   CHLORIDE 104  --   --  98 96*   CO2 26  --   --  26 25   BUN 12.1  --   --  13.1 19.9   CR 0.66  --   --  0.86 0.76   ANIONGAP 11  --   --  11 14   ОЛЬГА 9.2  --   --  9.2 9.7*   *  --   --  114* 135*     Most Recent D-dimer:Recent Labs   Lab Test 02/06/23  0825   DD 6.57*     7-Day Micro Results     Collected Updated Procedure Result Status      02/09/2023 0957 02/09/2023 1044 Asymptomatic Influenza A/B & SARS-CoV2 (COVID-19) Virus PCR Multiplex Nose [[id number]]    Swab from Nose    Final result Component Value   Influenza A PCR Negative   Influenza B PCR Negative   RSV PCR Negative   SARS CoV2 PCR Negative   NEGATIVE: SARS-CoV-2 (COVID-19) RNA not detected, presumed negative.            02/06/2023 1513 02/08/2023 1531 Blood Culture Hand, Right [[id number]]   Blood from Hand, Right    Preliminary result Component Value   Culture No growth after 2 days  [P]                02/06/2023 1506 02/08/2023 1531 Blood Culture Arm, Left [[id number]]   Blood from Arm, Left    Preliminary result Component Value   Culture No growth after 2 days  [P]                02/06/2023 1018 02/08/2023 1030 Urine Culture [[id number]]   Urine, Clean Catch    Final result Component Value   Culture Mixed Urogenital Sarah               02/06/2023 0821 02/08/2023 0817 Blood Culture Arm, Left [[id number]]    Blood from Arm, Left    Preliminary result Component Value   Culture No growth after 2 days  [P]                02/06/2023 0821 02/08/2023 0716 Blood Culture Arm, Left [[id number]]     (Abnormal)   Blood from Arm, Left    Preliminary result Component Value   Culture Streptococcus dysgalactiae (Group C/G Streptococcus)  [P]         Susceptibility      Streptococcus dysgalactiae (Group C/G Streptococcus)      CHANA (Preliminary)      Ampicillin <=0.06  Susceptible      Cefepime <=0.25   Susceptible      Cefotaxime <=0.25  Susceptible      Ceftriaxone (meningitis) <=0.25  Susceptible      Ceftriaxone (non-meningitis) <=0.25  Susceptible      Clindamycin <=0.06  Susceptible      Levofloxacin 0.5  Susceptible      Penicillin <=0.03  Susceptible      Tetracycline >4  Resistant     Vancomycin 0.5  Susceptible                           02/06/2023 0815 02/06/2023 0908 Symptomatic Influenza A/B & SARS-CoV2 (COVID-19) Virus PCR Multiplex Nasopharyngeal [[id number]]    Swab from Nasopharyngeal    Final result Component Value   Influenza A PCR Negative   Influenza B PCR Negative   RSV PCR Negative   SARS CoV2 PCR Negative   NEGATIVE: SARS-CoV-2 (COVID-19) RNA not detected, presumed negative.              ,   Results for orders placed or performed during the hospital encounter of 02/06/23   XR Chest Port 1 View    Narrative    PROCEDURE: XR CHEST PORT 1 VIEW 2/6/2023 8:34 AM    HISTORY: CP    COMPARISONS: 9/30/2022.    TECHNIQUE: Single portable view.    FINDINGS: Heart is stable in size. No acute infiltrate or effusion is  seen. Monitor leads are in place.         Impression    IMPRESSION: No acute disease.    MIKE SARMIENTO MD         SYSTEM ID:  [id number]   US Abdomen Limited    Narrative    PROCEDURES: US ABDOMEN LIMITED    HISTORY: Female, age 89 years, abdominal pain and vomiting, concern  for acute cholecystitis    TECHNIQUE: Ultrasound of the upper abdomen was performed.    COMPARISON: No relevant prior imaging.     FINDINGS:    LIVER: There are numerous well-circumscribed masses throughout the  liver parenchyma, concerning for metastatic malignant disease.    GALLBLADDER: Surgically absent.    Common bile duct diameter is not visualized.    ABDOMINAL AORTA AND IVC: The visualized portions of the abdominal  aorta are unremarkable.    PANCREAS:The visualized portions of the pancreas are normal.    RIGHT KIDNEY: The right kidney is normal. The right kidney measures  9.6 centimeters in  length.    OTHER: There is no free fluid in the upper abdomen.      Impression    IMPRESSION: Numerous masses throughout the liver parenchyma,  concerning for metastatic malignant disease.    LOIDA HANNA MD         SYSTEM ID:  [id number]   CT Chest/Abdomen/Pelvis w Contrast    Narrative    CT CHEST/ABDOMEN/PELVIS W CONTRAST    CLINICAL HISTORY: Female, age 89 years, likely liver mets with no  known cancer;    Comparison:  Abdominal ultrasound 2/6/2023    TECHNIQUE:  CT was performed of the chest, abdomen and pelvis  after  the administration of IV  contrast.   Axial; sagittal and coronal MIP images were reviewed.    FINDINGS:  Chest CT:   The lungs demonstrate scattered areas of atelectasis and perhaps  scarring. There is no evidence of well-defined nodule. Very mild  centrilobular emphysema is seen in the lung apices/upper lobes.    The mediastinal and axillary lymph nodes are normal in size and  number. Heart and great vessels demonstrate no acute abnormality.  Thyroid gland esophagus are grossly normal.    Abdomen/Pelvis CT:  Stomach and duodenum: Unremarkable.    Liver: There are numerous peripherally enhancing, low dense mass is  seen throughout the liver parenchyma. The largest seen posteriorly  within the right lobe measuring approximately 6 cm in maximal  dimension.    Gallbladder: Surgically absent. The common duct is distended with  attaining a maximum diameter of approximately 16 mm.    Spleen: Unremarkable.    Pancreas: There is atrophic change of the body and tail of the  pancreas is marked dilatation the pancreatic duct. There is a multiply  cystic mass seen within the head and uncinate process of the pancreas  with a 2 cm solid component.     Adrenal glands: Unremarkable.    Kidneys: Low dense cortical lesions are too small to characterize  however, likely represent cortical cysts. The ureters: Visualized  portions are unremarkable.    Urinary bladder: Unremarkable.    Large and small bowel:  Moderate volume of stool within the colon. No  evidence of acute abnormality.    The appendix is not seen. There are no secondary signs of  appendicitis.    There are number of enlarged and normal-sized lymph nodes throughout  the retroperitoneum of the upper abdomen including a 2.3 x 1.2 cm  nodular lymph node abutting the right lateral aspect of the superior  mesenteric artery. There is also mild fat stranding within the  mesentery.    The abdominal aorta and inferior vena cava demonstrate no acute  abnormality.    Bony structures: Degenerative changes are seen at the symphysis pubis  as well as within the SI joints and throughout the lumbar spine. There  is no distinct evidence of destructive bony lesion.        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  Differential diagnosis includes pancreatic ductal carcinoma with  obstruction of the pancreatic duct and common duct. Common duct  measures approximately 16 mm in maximum dimension.    No distinct evidence of metastatic disease to the lung/chest.    This facility minimizes radiation dose by adjusting the mA and/or kV  according to each patient size.      This CT scan was performed using one or more the following dose  reduction techniques:    -Automated exposure control,  -Adjustment of the mA and/or kV according to patient's size, and/or,  -Use of iterative reconstruction technique.    LOIDA HANNA MD         SYSTEM ID:  [id number]   CT Chest Pulmonary Embolism w Contrast    Narrative    CT CHEST PULMONARY EMBOLISM W CONTRAST  2/6/2023 6:15 PM    CLINICAL HISTORY: Female, age 89 years,  Female sex; Not pregnant; No  imaging to r/o PE in the last 24 hours; Pulmonary Embolism Rule-Out  Criteria (PERC) score > 0; Revised Ventura Score (RG) not >= 11; No  D-dimer result available; D-dimer not ordered;    Comparison:  CT scan chest abdomen pelvis  2/6/2023    TECHNIQUE: CT angiogram was performed of the chest  with IV contrast.   Axial; sagittal and coronal MIP images were reviewed..     FINDINGS:  Chest CT:    Excellent quality CTA demonstrates small filling defect within one of  the segmental arterial branches involving the lateral segment of the  right lower lobe. Otherwise, pulmonary arterial structures are patent.  There is no evidence of impending infarction. Atelectasis and  emphysematous changes are again seen within the lungs.    There is no evidence of pathologic lymph node enlargement.    Visualized portions of the upper abdomen again demonstrate nodular  appearance of liver with numerous low dense lesions. Nodular  thickening also seen within the head of the pancreas.      Bony structures: No acute abnormality.      Impression    IMPRESSION:   Positive for pulmonary embolus. Small filling defect is seen within  the segmental arterial branch of the lateral segment of the right  lower lobe without evidence of impending infarction.    Lungs demonstrate no evidence evidence of acute abnormality otherwise.    This facility minimizes radiation dose by adjusting the mA and/or kV  according to each patient size.      This CT scan was performed using one or more the following dose  reduction techniques:    -Automated exposure control,  -Adjustment of the mA and/or kV according to patient's size, and/or,  -Use of iterative reconstruction technique.    LOIDA HANNA MD         SYSTEM ID:  [id number]   US Abdomen Limited*    Narrative    PROCEDURES: US ABDOMEN LIMITED    HISTORY: Female, age 89 years, liver mets. Planning for biopsy    TECHNIQUE: Color and grayscale ultrasound of the upper abdomen was  performed.    COMPARISON: CT scan chest abdomen pelvis 2/6/2023 and abdominal  ultrasound 2/6/2023     FINDINGS:    LIVER: Numerous lesions are again seen throughout the liver parenchyma    GALLBLADDER: Surgically absent    Common bile duct diameter: 12  mm.    ABDOMINAL AORTA AND IVC: The visualized portions of the abdominal  aorta are unremarkable.    PANCREAS: Limited evaluation suggests the complex hypoechoic mass  within the head of the pancreas.    RIGHT KIDNEY: The visualized portions of the right kidney are  unremarkable.  OTHER: There is no free fluid in the upper abdomen.      Impression    IMPRESSION: Numerous lesions throughout the liver parenchyma are much  better characterized on this examination for prebiopsy planning.    Limited evaluation also partially demonstrates the complex hypoechoic  mass within the head of the pancreas.    LOIDA HANNA MD         SYSTEM ID:  [id number]       Discharge Medications   Current Discharge Medication List      START taking these medications    Details   amoxicillin-clavulanate (AUGMENTIN) 875-125 MG tablet Take 1 tablet by mouth 2 times daily  Qty: 10 tablet, Refills: 0    Associated Diagnoses: Secondary malignant neoplasm of liver (H)      Rivaroxaban ANTICOAGULANT 15 & 20 MG TBPK Starter Therapy Pack Take 15 mg by mouth 2 times daily (with meals) for 21 days, THEN 20 mg daily with food for 9 days.  Qty: 51 each, Refills: 0    Associated Diagnoses: Single subsegmental pulmonary embolism without acute cor pulmonale (H)         CONTINUE these medications which have CHANGED    Details   docusate sodium (COLACE) 100 MG capsule Take 1 capsule (100 mg) by mouth daily    Associated Diagnoses: NSTEMI (non-ST elevated myocardial infarction) (H)         CONTINUE these medications which have NOT CHANGED    Details   acetaminophen (TYLENOL) 500 MG tablet Take 1,000 mg by mouth every 8 hours as needed for pain      atorvastatin (LIPITOR) 80 MG tablet Take 1 tablet (80 mg) by mouth daily  Qty: 30 tablet, Refills: 0    Associated Diagnoses: NSTEMI (non-ST elevated myocardial infarction) (H)      Cranberry 1000 MG CAPS Take 1 tablet by mouth daily (with lunch)      furosemide (LASIX) 20 MG tablet Take 1 tablet (20 mg) by mouth  daily  Qty: 30 tablet, Refills: 0    Associated Diagnoses: NSTEMI (non-ST elevated myocardial infarction) (H)      lisinopril (ZESTRIL) 2.5 MG tablet Take 1 tablet (2.5 mg) by mouth daily  Qty: 30 tablet, Refills: 0    Associated Diagnoses: NSTEMI (non-ST elevated myocardial infarction) (H)      magnesium hydroxide (MOM) 2400 MG/10ML SUSP Take 5 mLs by mouth daily as needed for constipation      metoprolol succinate ER (TOPROL XL) 50 MG 24 hr tablet Take 50 mg by mouth daily      nitroGLYcerin (NITROSTAT) 0.4 MG sublingual tablet Place 0.4 mg under the tongue every 5 minutes as needed for chest pain       omeprazole (PRILOSEC) 20 MG DR capsule Take 20 mg by mouth daily      oxybutynin ER (DITROPAN XL) 5 MG 24 hr tablet Take 5 mg by mouth 2 times daily      potassium 99 MG TABS Take 1 tablet by mouth daily (with lunch)      vitamin C (ASCORBIC ACID) 1000 MG TABS Take 1,000 mg by mouth daily         STOP taking these medications       aspirin 81 MG EC tablet Comments:   Reason for Stopping:             Allergies   Allergies   Allergen Reactions     Codeine Nausea     Naproxen Nausea and Vomiting

## 2023-02-09 NOTE — PROGRESS NOTES
:     [facility] received prior auth from patient's insurance. They are able to accept patient and transport her at 11:00.     MARGAUX Meza on 2/9/2023 at 8:53 AM

## 2023-02-09 NOTE — PROGRESS NOTES
SAFETY CHECKLIST  ID Bands and Risk clasps correct and in place (DNR, Fall risk, Allergy, Latex, Limb):  Yes  All Lines Reconciled and labeled correctly: Yes  Whiteboard updated:Yes  Environmental interventions: Yes   Verify Tele #: not on tele

## 2023-02-09 NOTE — PHARMACY - DISCHARGE MEDICATION RECONCILIATION AND EDUCATION
[facility]  [facility] of [facility]  [address] [place] [id number]    February 9, 2023    Dear Pharmacist,    Your customer, Niru Durham, born on 11/4/1931, was recently discharged from [facility].  We have updated her medication list and want to alert you to the following:       Review of your medicines      START taking      Dose / Directions   amoxicillin-clavulanate 875-125 MG tablet  Commonly known as: AUGMENTIN  Used for: Secondary malignant neoplasm of liver (H)      Dose: 1 tablet  Take 1 tablet by mouth 2 times daily for 5 days  Quantity: 10 tablet  Refills: 0     Rivaroxaban ANTICOAGULANT 15 & 20 MG Tbpk Starter Therapy Pack  Used for: Single subsegmental pulmonary embolism without acute cor pulmonale (H)      Start taking on: February 9, 2023  Take 15 mg by mouth 2 times daily (with meals) for 21 days, THEN 20 mg daily with food for 9 days.  Quantity: 51 each  Refills: 0        CONTINUE these medicines which have NOT CHANGED      Dose / Directions   acetaminophen 500 MG tablet  Commonly known as: TYLENOL      Dose: 1,000 mg  Take 1,000 mg by mouth every 8 hours as needed for pain  Refills: 0     atorvastatin 80 MG tablet  Commonly known as: LIPITOR  Used for: NSTEMI (non-ST elevated myocardial infarction) (H)      Dose: 80 mg  Take 1 tablet (80 mg) by mouth daily  Quantity: 30 tablet  Refills: 0     Cranberry 1000 MG Caps      Dose: 1 tablet  Take 1 tablet by mouth daily (with lunch)  Refills: 0     docusate sodium 100 MG capsule  Commonly known as: COLACE  Used for: NSTEMI (non-ST elevated myocardial infarction) (H)      Dose: 100 mg  Take 1 capsule (100 mg) by mouth daily  Refills: 0     furosemide 20 MG tablet  Commonly known as: LASIX  Used for: NSTEMI (non-ST elevated myocardial infarction) (H)      Dose: 20 mg  Take 1 tablet (20 mg) by mouth daily  Quantity: 30 tablet  Refills: 0     lisinopril 2.5 MG tablet  Commonly known as:  ZESTRIL  Used for: NSTEMI (non-ST elevated myocardial infarction) (H)      Dose: 2.5 mg  Take 1 tablet (2.5 mg) by mouth daily  Quantity: 30 tablet  Refills: 0     magnesium hydroxide 2400 MG/10ML Susp  Commonly known as: MOM      Dose: 5 mL  Take 5 mLs by mouth daily as needed for constipation  Refills: 0     metoprolol succinate ER 50 MG 24 hr tablet  Commonly known as: TOPROL XL      Dose: 50 mg  Take 50 mg by mouth daily  Refills: 0     nitroGLYcerin 0.4 MG sublingual tablet  Commonly known as: NITROSTAT      Dose: 0.4 mg  Place 0.4 mg under the tongue every 5 minutes as needed for chest pain  Refills: 0     omeprazole 20 MG DR capsule  Commonly known as: priLOSEC      Dose: 20 mg  Take 20 mg by mouth daily  Refills: 0     oxybutynin ER 5 MG 24 hr tablet  Commonly known as: DITROPAN XL      Dose: 5 mg  Take 5 mg by mouth 2 times daily  Refills: 0     potassium 99 MG Tabs      Dose: 1 tablet  Take 1 tablet by mouth daily (with lunch)  Refills: 0     vitamin C 1000 MG Tabs  Commonly known as: ASCORBIC ACID      Dose: 1,000 mg  Take 1,000 mg by mouth daily  Refills: 0        STOP taking    aspirin 81 MG EC tablet              Where to get your medicines      These medications were sent to [facility] - [address] [facility]  [address], [facility] [address]    Phone: [phone]     amoxicillin-clavulanate 875-125 MG tablet    Rivaroxaban ANTICOAGULANT 15 & 20 MG Tbpk Starter Therapy Pack         We also reviewed Niru Durham's allergy list and updated it as needed:  Allergies: Codeine and Naproxen    Thank you for continuing to care for Niru Durham.  We look forward to working together with you in the future.    Sincerely,  Ritu Maguire, [facility]

## 2023-02-09 NOTE — PROGRESS NOTES
Pt with belching and indigestion throughout shift. PRN Maalox given 2X. PRN Zofran given this AM for nausea.  Caty Pereira RN on 2/9/2023 at 5:46 AM

## 2023-02-09 NOTE — PLAN OF CARE
"Pt is A/O to self/place/time, not to situation. VS stable, BP elevated. Pt transferred as an A2 with gait belt and a walker. LS dim in lower bases but clear throughout. Redness present to RLE, area is outlined. There is warmth and erythema.     BP (!) 166/79 (BP Location: Left arm, Patient Position: Semi-Egan's, Cuff Size: Adult Regular)   Pulse 89   Temp 100  F (37.8  C) (Tympanic)   Resp 20   Ht 1.575 m (5' 2\")   Wt 87.9 kg (193 lb 11.2 oz)   SpO2 91%   BMI 35.43 kg/m              Goal Outcome Evaluation:      Plan of Care Reviewed With: patient    Overall Patient Progress: improvingOverall Patient Progress: improving           "

## 2023-02-09 NOTE — PROGRESS NOTES
[facility] called for nurse to nurse report. Message left for LINDA Hansen to call back if she has any questions in regards to the patient or orders.

## 2023-02-09 NOTE — PROGRESS NOTES
Patient has been assessed for Home Oxygen needs. Oxygen readings:    *Pulse oximetry (SpO2) = 86% on room air at rest while awake.    *SpO2 improved to 92% on 1liters/minute at rest.    Kamran Knight, RT

## 2023-02-09 NOTE — PLAN OF CARE
Goal Outcome Evaluation:  Pt A&O X4, disoriented to situation. Restless overnight, not sleeping well. Essential oil patch utilized for relaxation- pt able to sleep. Scheduled pain medications given prior to bed, complained 1X of chronic right shoulder pain. Ice applied, pillow support. Lungs are clear throughout. Intermittent audible wheeze heard. Tachypneic at times. Dry, weak cough. O2 at 1.5L via NC. RLE red, hot to touch, edematous. Redness exceeding marked boarders. New line drawn up toward the knee. Purewick in place for incontinence. Fluids infusing at 75 mL/hr of NS. Scheduled abx given. Caty Pereira RN on 2/9/2023 at 5:10 AM         Plan of Care Reviewed With: patient    Overall Patient Progress: no change

## 2023-02-10 NOTE — TELEPHONE ENCOUNTER
Transitional Care Management    Patient discharged to skilled nursing facility for short term rehab. No TCM call required per policy. Yola Castillo RN on 2/10/2023 at 8:01 AM

## 2023-02-13 NOTE — TELEPHONE ENCOUNTER
We do not accept [employer] insurance.  Patient will need to be seen elsewhere.    Dania Cardenas on 2/13/2023 at 11:14 AM

## (undated) RX ORDER — ONDANSETRON 2 MG/ML
INJECTION INTRAMUSCULAR; INTRAVENOUS
Status: DISPENSED
Start: 2019-10-28

## (undated) RX ORDER — ACETAMINOPHEN 500 MG
TABLET ORAL
Status: DISPENSED
Start: 2022-01-01

## (undated) RX ORDER — ACETAMINOPHEN 325 MG/1
TABLET ORAL
Status: DISPENSED
Start: 2019-10-28

## (undated) RX ORDER — SODIUM CHLORIDE 9 MG/ML
INJECTION, SOLUTION INTRAVENOUS
Status: DISPENSED
Start: 2019-10-28

## (undated) RX ORDER — SODIUM CHLORIDE, SODIUM LACTATE, POTASSIUM CHLORIDE, CALCIUM CHLORIDE 600; 310; 30; 20 MG/100ML; MG/100ML; MG/100ML; MG/100ML
INJECTION, SOLUTION INTRAVENOUS
Status: DISPENSED
Start: 2023-01-01

## (undated) RX ORDER — CEFTRIAXONE SODIUM 2 G
VIAL (EA) INJECTION
Status: DISPENSED
Start: 2019-10-28

## (undated) RX ORDER — FUROSEMIDE 10 MG/ML
INJECTION INTRAMUSCULAR; INTRAVENOUS
Status: DISPENSED
Start: 2022-01-01

## (undated) RX ORDER — ACETAMINOPHEN 500 MG
TABLET ORAL
Status: DISPENSED
Start: 2023-01-01

## (undated) RX ORDER — ENOXAPARIN SODIUM 100 MG/ML
INJECTION SUBCUTANEOUS
Status: DISPENSED
Start: 2023-01-01

## (undated) RX ORDER — ONDANSETRON 2 MG/ML
INJECTION INTRAMUSCULAR; INTRAVENOUS
Status: DISPENSED
Start: 2023-01-01